# Patient Record
Sex: MALE | Race: WHITE | URBAN - METROPOLITAN AREA
[De-identification: names, ages, dates, MRNs, and addresses within clinical notes are randomized per-mention and may not be internally consistent; named-entity substitution may affect disease eponyms.]

---

## 2021-07-12 ENCOUNTER — TELEPHONE (OUTPATIENT)
Dept: ORTHOPEDIC SURGERY | Age: 36
End: 2021-07-12

## 2021-07-12 NOTE — TELEPHONE ENCOUNTER
Appointment Request     Patient requesting earlier appointment: Yes  Appointment offered to patient: 8/3/2021  Patient Contact Number: [phone]   [place] LOCATION WORKS BEST PATIENT STATED. no gum bleeding/no nose bleeding/no skin lumps

## 2021-07-15 ENCOUNTER — OFFICE VISIT (OUTPATIENT)
Dept: ORTHOPEDIC SURGERY | Age: 36
End: 2021-07-15
Payer: MEDICAID

## 2021-07-15 VITALS — BODY MASS INDEX: 29.4 KG/M2 | WEIGHT: 210 LBS | HEIGHT: 71 IN

## 2021-07-15 DIAGNOSIS — M22.41 CHONDROMALACIA OF BOTH PATELLAE: Primary | ICD-10-CM

## 2021-07-15 DIAGNOSIS — M65.9 SYNOVITIS OF BOTH KNEE JOINTS: ICD-10-CM

## 2021-07-15 DIAGNOSIS — M22.42 CHONDROMALACIA OF BOTH PATELLAE: Primary | ICD-10-CM

## 2021-07-15 PROBLEM — M65.962 SYNOVITIS OF BOTH KNEE JOINTS: Status: ACTIVE | Noted: 2021-07-15

## 2021-07-15 PROBLEM — M65.961 SYNOVITIS OF BOTH KNEE JOINTS: Status: ACTIVE | Noted: 2021-07-15

## 2021-07-15 PROCEDURE — 99203 OFFICE O/P NEW LOW 30 MIN: CPT | Performed by: ORTHOPAEDIC SURGERY
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
## 2021-07-26 ENCOUNTER — TELEPHONE (OUTPATIENT)
Dept: ORTHOPEDIC SURGERY | Age: 36
End: 2021-07-26

## 2021-07-30 NOTE — TELEPHONE ENCOUNTER
Auth: # [id number]    Date: 8/16/2021 thru 11/14/2021  Type of SX:  Outpatient  [place] [facility] [place]  CPT: 55235, 94822   DX Code: M22.42, M65.9  SX area: Lt knee  Insurance: [facility]

## 2021-08-07 ENCOUNTER — HOSPITAL ENCOUNTER (EMERGENCY)
Age: 36
Discharge: HOME | End: 2021-08-08
Attending: EMERGENCY MEDICINE
Payer: MEDICAID

## 2021-08-07 ENCOUNTER — APPOINTMENT (OUTPATIENT)
Dept: GENERAL RADIOLOGY | Age: 36
End: 2021-08-07
Payer: MEDICAID

## 2021-08-07 VITALS
WEIGHT: 220 LBS | OXYGEN SATURATION: 99 % | TEMPERATURE: 98.8 F | BODY MASS INDEX: 30.8 KG/M2 | HEART RATE: 54 BPM | HEIGHT: 71 IN | DIASTOLIC BLOOD PRESSURE: 76 MMHG | RESPIRATION RATE: 18 BRPM | SYSTOLIC BLOOD PRESSURE: 119 MMHG

## 2021-08-07 DIAGNOSIS — S61.319A LACERATION OF NAIL BED OF FINGER, INITIAL ENCOUNTER: ICD-10-CM

## 2021-08-07 DIAGNOSIS — S62.523B OPEN FRACTURE OF TUFT OF DISTAL PHALANX OF THUMB: Primary | ICD-10-CM

## 2021-08-07 PROCEDURE — 12001 RPR S/N/AX/GEN/TRNK 2.5CM/<: CPT

## 2021-08-07 PROCEDURE — 99285 EMERGENCY DEPT VISIT HI MDM: CPT

## 2021-08-07 PROCEDURE — 6370000000 HC RX 637 (ALT 250 FOR IP): Performed by: EMERGENCY MEDICINE

## 2021-08-07 PROCEDURE — 73140 X-RAY EXAM OF FINGER(S): CPT

## 2021-08-07 PROCEDURE — 90715 TDAP VACCINE 7 YRS/> IM: CPT | Performed by: EMERGENCY MEDICINE

## 2021-08-07 PROCEDURE — 90471 IMMUNIZATION ADMIN: CPT | Performed by: EMERGENCY MEDICINE

## 2021-08-07 PROCEDURE — 12002 RPR S/N/AX/GEN/TRNK2.6-7.5CM: CPT

## 2021-08-07 PROCEDURE — 6360000002 HC RX W HCPCS: Performed by: EMERGENCY MEDICINE

## 2021-08-07 RX ORDER — HYDROCODONE BITARTRATE AND ACETAMINOPHEN 5; 325 MG/1; MG/1
1 TABLET ORAL ONCE
Status: COMPLETED | OUTPATIENT
Start: 2021-08-07 | End: 2021-08-07

## 2021-08-07 RX ORDER — IBUPROFEN 600 MG/1
600 TABLET ORAL ONCE
Status: COMPLETED | OUTPATIENT
Start: 2021-08-07 | End: 2021-08-07

## 2021-08-07 RX ORDER — CEPHALEXIN 500 MG/1
500 CAPSULE ORAL 4 TIMES DAILY
Qty: 28 CAPSULE | Refills: 0 | Status: SHIPPED | OUTPATIENT
Start: 2021-08-07 | End: 2021-08-14

## 2021-08-07 RX ORDER — HYDROCODONE BITARTRATE AND ACETAMINOPHEN 5; 325 MG/1; MG/1
1 TABLET ORAL EVERY 8 HOURS PRN
Qty: 8 TABLET | Refills: 0 | Status: SHIPPED | OUTPATIENT
Start: 2021-08-07 | End: 2021-08-10

## 2021-08-07 RX ORDER — CEPHALEXIN 500 MG/1
500 CAPSULE ORAL ONCE
Status: COMPLETED | OUTPATIENT
Start: 2021-08-07 | End: 2021-08-07

## 2021-08-07 RX ADMIN — HYDROCODONE BITARTRATE AND ACETAMINOPHEN 1 TABLET: 5; 325 TABLET ORAL at 22:17

## 2021-08-07 RX ADMIN — CEPHALEXIN 500 MG: 500 CAPSULE ORAL at 23:04

## 2021-08-07 RX ADMIN — IBUPROFEN 600 MG: 600 TABLET, FILM COATED ORAL at 22:17

## 2021-08-07 RX ADMIN — TETANUS TOXOID, REDUCED DIPHTHERIA TOXOID AND ACELLULAR PERTUSSIS VACCINE, ADSORBED 0.5 ML: 5; 2.5; 8; 8; 2.5 SUSPENSION INTRAMUSCULAR at 22:17

## 2021-08-07 ASSESSMENT — PAIN SCALES - GENERAL
PAINLEVEL_OUTOF10: 8
PAINLEVEL_OUTOF10: 9

## 2021-08-07 ASSESSMENT — PAIN DESCRIPTION - PAIN TYPE: TYPE: ACUTE PAIN

## 2021-08-07 ASSESSMENT — PAIN DESCRIPTION - LOCATION: LOCATION: HAND

## 2021-08-07 ASSESSMENT — PAIN DESCRIPTION - ORIENTATION: ORIENTATION: LEFT

## 2021-08-08 ASSESSMENT — PAIN SCALES - GENERAL: PAINLEVEL_OUTOF10: 5

## 2021-08-08 ASSESSMENT — PAIN - FUNCTIONAL ASSESSMENT: PAIN_FUNCTIONAL_ASSESSMENT: 0-10

## 2021-08-08 NOTE — ED NOTES
Second perfect serve message sent and Dr. Biju Peck called back.      Hossein Eduardon, EMT-P  08/07/21 3942

## 2021-08-08 NOTE — ED PROVIDER NOTES
 Ran Out of Food in the Last Year:    Transportation Needs:     Lack of Transportation (Medical):  Lack of Transportation (Non-Medical):    Physical Activity:     Days of Exercise per Week:     Minutes of Exercise per Session:    Stress:     Feeling of Stress :    Social Connections:     Frequency of Communication with Friends and Family:     Frequency of Social Gatherings with Friends and Family:     Attends Mormonism Services:     Active Member of Clubs or Organizations:     Attends Club or Organization Meetings:     Marital Status:    Intimate Partner Violence:     Fear of Current or Ex-Partner:     Emotionally Abused:     Physically Abused:     Sexually Abused:      No current facility-administered medications for this encounter. Current Outpatient Medications   Medication Sig Dispense Refill    cephALEXin (KEFLEX) 500 MG capsule Take 1 capsule by mouth 4 times daily for 7 days 28 capsule 0    HYDROcodone-acetaminophen (NORCO) 5-325 MG per tablet Take 1 tablet by mouth every 8 hours as needed for Pain for up to 3 days. Intended supply: 3 days. Take lowest dose possible to manage pain 8 tablet 0    Cholecalciferol (VITAMIN D) 2000 UNITS CAPS capsule Take by mouth       FLUoxetine (PROZAC) 10 MG tablet Take 10 mg by mouth daily        No Known Allergies    REVIEW OF SYSTEMS  10 systems reviewed, pertinent positives per HPI otherwise noted to be negative. PHYSICAL EXAM  /76   Pulse 54   Temp 98.8 °F (37.1 °C) (Oral)   Resp 18   Ht 5' 11\" (1.803 m)   Wt 220 lb (99.8 kg)   SpO2 99%   BMI 30.68 kg/m²    Physical exam:  General appearance: awake and cooperative. Mild pain distress. Non toxic appearing. Skin: Warm and dry. No rashes or lesions. HENT: Normocephalic. Atraumatic. Neck: supple  Eyes: JOVANI. EOM intact. Heart: RRR. No murmurs. Lungs: Respirations unlabored. CTAB. No wheezes, rales, or rhonchi. Good air exchange  Abdomen: No tenderness. Soft.  Non distended. No peritoneal signs. Musculoskeletal: left hand: there is tenderness to palpation and mild soft tissue swelling to distal thumb; there is linear laceration to lateral nailbed without subungual hematoma laceration does not extend to surrounding skin; perfusion distal to laceration is intact; sensation intact distal thumb; MCP, PIP and DIP intact but Dip limited by pain; medial, radial, and ulnar nerves intact motor and sensory. cardinal motions of hand intact. Cap refill <2 seconds. Radial and ulnar pulses +2/4 bilaterally. No extremity edema. Compartments soft. No deformity. All extremities neurovascularly intact. Neurological: Alert and oriented. No focal deficits. No aphasia or dysarthria. No gait ataxia. Psychiatric: Normal mood and affect. LABS  I have reviewed all labs for this visit. No results found for this visit on 08/07/21. ECG      RADIOLOGY  XR FINGER LEFT (MIN 2 VIEWS)    Result Date: 8/7/2021  EXAMINATION: 3 XRAY VIEWS OF THE LEFT FINGER 8/7/2021 10:09 pm COMPARISON: None. HISTORY: ORDERING SYSTEM PROVIDED HISTORY: smashed left thumb with mallet distally TECHNOLOGIST PROVIDED HISTORY: Reason for exam:->smashed left thumb with mallet distally Reason for Exam: lt thumb pain, swelling Acuity: Acute Type of Exam: Initial Mechanism of Injury: smashed finger with mallet FINDINGS: There is a mildly displaced and slightly comminuted fracture along the tuft of the distal phalanx of the 1st digit. The joint spaces are intact. There is mild soft tissue swelling throughout the digit. Mildly displaced and slightly comminuted tuft fracture along the distal phalanx of the 1st digit. ED COURSE/MDM  Patient seen and evaluated. Old records reviewed. Labs and imaging reviewed and results discussed with patient. The patient is a 25-year-old presenting with left thumb injury. He is neurovascularly intact.   He does have a linear laceration to the lateral aspect of his left thumb, bleeding is controlled. He has a comminuted and mildly displaced tuft fracture of the distal thumb as well, considered an open fracture due to the nailbed laceration. He is covered with Keflex for the open laceration. I did repair the nailbed laceration with Dermabond since it was already well approximated. The patient was placed in a splint. Tetanus is updated. Due to the fracture being open, I did contact orthopedics and spoke with Dr. Corbin Choi. We discussed the case and he states that the patient can follow-up in the office on Tuesday for further evaluation and treatment. I discussed with the patient and he will follow-up. Otherwise symptomatic care instructions were given to the patient for home, as well as a short course of Norco for his fracture. He is given a prescription for Keflex. We discussed return precautions back to the ED and he voices understanding. PROCEDURE:  LACERATION REPAIR  Aung Overton or their surrogate had an opportunity to ask questions, and the risks, benefits, and alternatives were discussed. The wound was prepped and draped to maintain a sterile field. It was copiously irrigated. It was explored to its depth in a bloodless field with no sign of tendon, nerve, or vascular injury. No foreign bodies were identified. It was closed with dermabond. There were no complications during the procedure. During the patient's ED course, the patient was given:  Medications   HYDROcodone-acetaminophen (NORCO) 5-325 MG per tablet 1 tablet (1 tablet Oral Given 8/7/21 2217)   ibuprofen (ADVIL;MOTRIN) tablet 600 mg (600 mg Oral Given 8/7/21 2217)   Tetanus-Diphth-Acell Pertussis (BOOSTRIX) injection 0.5 mL (0.5 mLs Intramuscular Given 8/7/21 2217)   cephALEXin (KEFLEX) capsule 500 mg (500 mg Oral Given 8/7/21 2304)        CLINICAL IMPRESSION  1. Open fracture of tuft of distal phalanx of thumb    2.  Laceration of nail bed of finger, initial encounter        Blood pressure 119/76, pulse 54, temperature 98.8 °F (37.1 °C), temperature source Oral, resp. rate 18, height 5' 11\" (1.803 m), weight 220 lb (99.8 kg), SpO2 99 %. Patient was given scripts for the following medications. I counseled patient how to take these medications. Discharge Medication List as of 8/7/2021 11:57 PM      START taking these medications    Details   cephALEXin (KEFLEX) 500 MG capsule Take 1 capsule by mouth 4 times daily for 7 days, Disp-28 capsule, R-0Print      HYDROcodone-acetaminophen (NORCO) 5-325 MG per tablet Take 1 tablet by mouth every 8 hours as needed for Pain for up to 3 days. Intended supply: 3 days. Take lowest dose possible to manage pain, Disp-8 tablet, R-0Print             Follow-up with:  Huey Ribeiro MD  [address] [place] [address]  [phone]    Schedule an appointment as soon as possible for a visit in 3 days        DISCLAIMER: This chart was created using Dragon dictation software. Efforts were made by me to ensure accuracy, however some errors may be present due to limitations of this technology and occasionally words are not transcribed correctly.        Sedrick [address] [place] [address]  08/08/21 8082

## 2021-08-09 ENCOUNTER — TELEPHONE (OUTPATIENT)
Dept: ORTHOPEDIC SURGERY | Age: 36
End: 2021-08-09

## 2021-08-09 NOTE — TELEPHONE ENCOUNTER
Appointment Request     Patient requesting earlier appointment: Yes  Appointment offered to patient: Yes   Patient Contact Number: [phone]    Patient scheduled for tomorrow at [facility], wants to know If he can be seen at [facility] because it is closer to his home.

## 2021-08-09 NOTE — TELEPHONE ENCOUNTER
Patient states that he is willing to keep his appointment at [address] [place] [address] tomorrow due to limited scheduling times.

## 2021-08-10 ENCOUNTER — OFFICE VISIT (OUTPATIENT)
Dept: ORTHOPEDIC SURGERY | Age: 36
End: 2021-08-10
Payer: MEDICAID

## 2021-08-10 ENCOUNTER — HOSPITAL ENCOUNTER (OUTPATIENT)
Age: 36
Discharge: HOME | End: 2021-08-10
Payer: MEDICAID

## 2021-08-10 VITALS — HEIGHT: 71 IN | BODY MASS INDEX: 30.8 KG/M2 | WEIGHT: 220 LBS | RESPIRATION RATE: 16 BRPM

## 2021-08-10 DIAGNOSIS — F17.200 CURRENT SMOKER: ICD-10-CM

## 2021-08-10 DIAGNOSIS — S62.522B OPEN FRACTURE OF TUFT OF DISTAL PHALANX OF LEFT THUMB: Primary | ICD-10-CM

## 2021-08-10 PROCEDURE — 26750 TREAT FINGER FRACTURE EACH: CPT | Performed by: ORTHOPAEDIC SURGERY

## 2021-08-10 PROCEDURE — 99214 OFFICE O/P EST MOD 30 MIN: CPT | Performed by: ORTHOPAEDIC SURGERY

## 2021-08-10 PROCEDURE — G8417 CALC BMI ABV UP PARAM F/U: HCPCS | Performed by: ORTHOPAEDIC SURGERY

## 2021-08-10 PROCEDURE — 99406 BEHAV CHNG SMOKING 3-10 MIN: CPT | Performed by: ORTHOPAEDIC SURGERY

## 2021-08-10 PROCEDURE — U0005 INFEC AGEN DETEC AMPLI PROBE: HCPCS

## 2021-08-10 PROCEDURE — 4004F PT TOBACCO SCREEN RCVD TLK: CPT | Performed by: ORTHOPAEDIC SURGERY

## 2021-08-10 PROCEDURE — G8427 DOCREV CUR MEDS BY ELIG CLIN: HCPCS | Performed by: ORTHOPAEDIC SURGERY

## 2021-08-10 PROCEDURE — U0003 INFECTIOUS AGENT DETECTION BY NUCLEIC ACID (DNA OR RNA); SEVERE ACUTE RESPIRATORY SYNDROME CORONAVIRUS 2 (SARS-COV-2) (CORONAVIRUS DISEASE [COVID-19]), AMPLIFIED PROBE TECHNIQUE, MAKING USE OF HIGH THROUGHPUT TECHNOLOGIES AS DESCRIBED BY CMS-2020-01-R: HCPCS

## 2021-08-10 NOTE — PROGRESS NOTES
CHIEF COMPLAINT: Left hand thumb pain/ thumb distal phalanx open fracture. DATE OF INJURY: 8/7/2021    HISTORY:  Mr. Krunal Walton 39 y.o.  male right handed presents today for the first visit for evaluation of a left hand injury which occurred when he hit his thumb with a mallet. He noticed blood coming from the nail. He is complaining of thumb pain and swelling. He rates his pain a 4/10 VAS. This is better with elevation and worse with ROM. The pain is sharp and not radiating. He is on antibiotics. No other complaint. He was seen at [facility], was evaluated, started on antibiotics and given a tetanus shot and splinted and asked to see orthopedics. He has a heavy duty job. He is currently off work for upcoming knee surgery by Dr. Darell Mehta on 8/16/2021. He has a smoker 1 pack of cigarettes a day. History reviewed. No pertinent past medical history. History reviewed. No pertinent surgical history. Social History     Socioeconomic History    Marital status: Single     Spouse name: Not on file    Number of children: Not on file    Years of education: Not on file    Highest education level: Not on file   Occupational History    Not on file   Tobacco Use    Smoking status: Current Every Day Smoker     Packs/day: 1.00     Types: Cigarettes    Smokeless tobacco: Never Used   Substance and Sexual Activity    Alcohol use: Yes     Comment: rarely    Drug use: No    Sexual activity: Yes     Partners: Female   Other Topics Concern    Not on file   Social History Narrative    Not on file     Social Determinants of Health     Financial Resource Strain:     Difficulty of Paying Living Expenses:    Food Insecurity:     Worried About Running Out of Food in the Last Year:     [address] in the Last Year:    Transportation Needs:     Lack of Transportation (Medical):      Lack of Transportation (Non-Medical):    Physical Activity:     Days of Exercise per Week:     Minutes of Exercise per Session:    Stress:     Feeling of Stress :    Social Connections:     Frequency of Communication with Friends and Family:     Frequency of Social Gatherings with Friends and Family:     Attends Orthodoxy Services:     Active Member of Clubs or Organizations:     Attends Club or Organization Meetings:     Marital Status:    Intimate Partner Violence:     Fear of Current or Ex-Partner:     Emotionally Abused:     Physically Abused:     Sexually Abused:        History reviewed. No pertinent family history. Current Outpatient Medications on File Prior to Visit   Medication Sig Dispense Refill    cephALEXin (KEFLEX) 500 MG capsule Take 1 capsule by mouth 4 times daily for 7 days 28 capsule 0    HYDROcodone-acetaminophen (NORCO) 5-325 MG per tablet Take 1 tablet by mouth every 8 hours as needed for Pain for up to 3 days. Intended supply: 3 days. Take lowest dose possible to manage pain 8 tablet 0    Cholecalciferol (VITAMIN D) 2000 UNITS CAPS capsule Take by mouth       FLUoxetine (PROZAC) 10 MG tablet Take 10 mg by mouth daily        No current facility-administered medications on file prior to visit. Pertinent items are noted in HPI  Review of systems reviewed from Patient History Form dated on 8/10/2021 and available in the patient's chart under the Media tab. No change noted. PHYSICAL EXAMINATION:  Mr. Evelina Hammer is a very pleasant 39 y.o.  male who presents today in no acute distress, awake, alert, and oriented. He is well dressed, nourished and  groomed. Patient with normal affect. Height is  5' 11\" (1.803 m), weight is 220 lb (99.8 kg), Body mass index is 30.68 kg/m². Resting respiratory rate is 16. Examination of the gait, showed that the patient walks with no limp . Examination of both upper extremities showing a decreased range of motion of the left thumb compare to the other side.   There is moderate swelling that can be seen, as well as ecchymosis of the thumb. There is an area near the radial side of the thumb near the nail with small laceration with Dermabond. He has intact sensation and good radial pulses. He has significant tenderness on deep palpation over the distal phalanx of the thumb left hand. There is no rotational deformity of the left thumb. IMAGING: Bibi Mcrae were reviewed, dated 8/7/2021 from [facility],  3 views of the left hand, and showed a thumb distal phalanx minimally displaced tuft fracture. IMPRESSION: Left hand thumb distal phalanx minimally displaced open fracture. PLAN:  I discussed that the overall alignment of this fracture is good and that we can try to treat this non-operatively. Band-Aid applied to protect the nail. We discussed the risk of nonunion and or malunion. Continue antibiotics until gone. We will see him  back in 6 weeks at which time we will get a new xray of the left hand. The patient smokes, and we discussed with the patient the risks of smoking on general health and also on bone and soft tissue healing (delay and non-union), and promised to cut down or stop smoking. Smoking: Educated the patient regarding the hazards of smoking and that it harms their body in many ways. It increases the chance of developing heart disease, lung disease, cancer, and other health problems including poor bone and wound healing. The importance of smoking cessation for optimal bone and wound healing was stressed. This was communicated verbally, 5 Minutes.       Sakina Mathis MD

## 2021-08-11 ENCOUNTER — TELEPHONE (OUTPATIENT)
Dept: ORTHOPEDIC SURGERY | Age: 36
End: 2021-08-11

## 2021-08-11 LAB — SARS-COV-2, PCR: NOT DETECTED

## 2021-08-11 RX ORDER — ESOMEPRAZOLE MAGNESIUM 40 MG/1
40 FOR SUSPENSION ORAL DAILY
COMMUNITY
End: 2021-12-16

## 2021-08-11 RX ORDER — MELOXICAM 15 MG/1
15 TABLET ORAL DAILY
COMMUNITY
End: 2021-12-16

## 2021-08-11 NOTE — PROGRESS NOTES
Preoperative Screening for Elective Surgery/Invasive Procedures While COVID-19 present in the community     Have you had any of the following symptoms?no  o Fever, chills  o Cough  o Shortness of breath  o Muscle aches/pain  o Diarrhea  o Abdominal pain, nausea, vomiting  o Loss or decrease in taste and / or smell   Risk of Exposure  o Have you recently been hospitalized for COVID-19 or flu-like illness, if so when?no  o Recently diagnosed with COVID-19, if so when?no  o Recently tested for COVID-19, if so when?yes 8/10/21 for surgery  o Have you been in close contact with a person or family member who currently has or recently had COVID-19? If yes, when and in what context?no  o Do you live with anybody who in the last 14 days has had fever, chills, shortness of breath, muscle aches, flu-like illness?no  o Do you have any close contacts or family members who are currently in the hospital for COVID-19 or flu-like illness? If yes, assess recent close contact with this person. no    Indicate if the patient has a positive screen by answering yes to one or more of the above questions. Patients who test positive or screen positive prior to surgery or on the day of surgery should be evaluated in conjunction with the surgeon/proceduralist/anesthesiologist to determine the urgency of the procedure.

## 2021-08-11 NOTE — PROGRESS NOTES
PRE OP INSTRUCTION SHEET   1. Do not eat or drink anything after 12 midnight  prior to surgery. This includes no water, chewing gum or mints. 2. Take the following pills will a small sip of water (see MAR)                                        3. Aspirin, Ibuprofen, Advil, Naproxen, Vitamin E, fish oil and other Anti-inflammatory products should be stopped for 5 days before surgery or as directed by your physician. 4. Check with your Doctor regarding stopping Plavix, Coumadin, Lovenox, Fragmin or other blood thinners   5. Do not smoke, and do not drink any alcoholic beverages 24 hours prior to surgery. This includes NA Beer. 6. You may brush your teeth and gargle the morning of surgery. DO NOT SWALLOW WATER   7. You MUST make arrangements for a responsible adult to take you home after your surgery. You will not be allowed to leave alone or drive yourself home. It is strongly suggested someone stay with you the first 24 hrs. Your surgery will be cancelled if you do not have a ride home. 8. A parent/legal guardian must accompany a child scheduled for surgery and plan to stay at the hospital until the child is discharged. Please do not bring other children with you. 9. Please wear simple, loose fitting clothing to the hospital.  [place] not bring valuables (money, credit cards, checkbooks, etc.) Do not wear any makeup (including no eye makeup) or nail polish on your fingers or toes. 10. DO NOT wear any jewelry or piercings on day of surgery. All body piercing jewelry must be removed. 11. If you have dentures,glasses, or contacts they will be removed before going to the OR; we will provide you a container. 12. Please see your family doctor/and cardiologist for a history & physical and/or concerning medications. Bring any test results/reports from your physician's office. Have history and labs faxed to [phone].  Remember to bring Blood Bank bracelet on the day of surgery. 14. If you have a Living Will and Durable Power of  for Healthcare, please bring in a copy. 13. Notify your Surgeon if you develop any illness between now and surgery  time, cough, cold, fever, sore throat, nausea, vomiting, etc.  Please notify your surgeon if you experience dizziness, shortness of breath or blurred vision between now & the time of your surgery   16. DO NOT shave your operative site 96 hours prior to surgery. For face & neck surgery, men may use an electric razor 48 hours prior to surgery. 17. Shower with _x__Antibacterial soap (x_chlorhexidine for total joint  Pt's) shower two times before surgery.(the morning of and the night before. 18. To provide excellent care visitors will be limited to one in the room at any given time.   Please call pre admission testing if you any further questions [phone] or [phone]

## 2021-08-14 ENCOUNTER — ANESTHESIA EVENT (OUTPATIENT)
Dept: OPERATING ROOM | Age: 36
End: 2021-08-14
Payer: MEDICAID

## 2021-08-15 PROBLEM — S62.522B: Status: ACTIVE | Noted: 2021-08-15

## 2021-08-16 ENCOUNTER — HOSPITAL ENCOUNTER (OUTPATIENT)
Age: 36
Setting detail: OUTPATIENT SURGERY
Discharge: HOME | End: 2021-08-16
Attending: ORTHOPAEDIC SURGERY | Admitting: ORTHOPAEDIC SURGERY
Payer: MEDICAID

## 2021-08-16 ENCOUNTER — ANESTHESIA (OUTPATIENT)
Dept: OPERATING ROOM | Age: 36
End: 2021-08-16
Payer: MEDICAID

## 2021-08-16 VITALS
RESPIRATION RATE: 14 BRPM | SYSTOLIC BLOOD PRESSURE: 125 MMHG | BODY MASS INDEX: 30.8 KG/M2 | OXYGEN SATURATION: 97 % | DIASTOLIC BLOOD PRESSURE: 74 MMHG | HEART RATE: 82 BPM | WEIGHT: 220 LBS | TEMPERATURE: 98.2 F | HEIGHT: 71 IN

## 2021-08-16 VITALS
TEMPERATURE: 95.9 F | DIASTOLIC BLOOD PRESSURE: 60 MMHG | SYSTOLIC BLOOD PRESSURE: 110 MMHG | OXYGEN SATURATION: 98 % | RESPIRATION RATE: 14 BRPM

## 2021-08-16 DIAGNOSIS — M22.41 CHONDROMALACIA OF BOTH PATELLAE: ICD-10-CM

## 2021-08-16 DIAGNOSIS — M65.9 SYNOVITIS OF BOTH KNEE JOINTS: ICD-10-CM

## 2021-08-16 DIAGNOSIS — M22.42 CHONDROMALACIA OF BOTH PATELLAE: ICD-10-CM

## 2021-08-16 DIAGNOSIS — S62.522B OPEN FRACTURE OF TUFT OF DISTAL PHALANX OF LEFT THUMB: Primary | ICD-10-CM

## 2021-08-16 PROCEDURE — 6360000002 HC RX W HCPCS

## 2021-08-16 PROCEDURE — 7100000001 HC PACU RECOVERY - ADDTL 15 MIN: Performed by: ORTHOPAEDIC SURGERY

## 2021-08-16 PROCEDURE — 2580000003 HC RX 258: Performed by: ANESTHESIOLOGY

## 2021-08-16 PROCEDURE — 7100000010 HC PHASE II RECOVERY - FIRST 15 MIN: Performed by: ORTHOPAEDIC SURGERY

## 2021-08-16 PROCEDURE — 3600000014 HC SURGERY LEVEL 4 ADDTL 15MIN: Performed by: ORTHOPAEDIC SURGERY

## 2021-08-16 PROCEDURE — 3700000001 HC ADD 15 MINUTES (ANESTHESIA): Performed by: ORTHOPAEDIC SURGERY

## 2021-08-16 PROCEDURE — 6370000000 HC RX 637 (ALT 250 FOR IP): Performed by: ANESTHESIOLOGY

## 2021-08-16 PROCEDURE — 2500000003 HC RX 250 WO HCPCS: Performed by: ORTHOPAEDIC SURGERY

## 2021-08-16 PROCEDURE — 2580000003 HC RX 258: Performed by: ORTHOPAEDIC SURGERY

## 2021-08-16 PROCEDURE — 2500000003 HC RX 250 WO HCPCS

## 2021-08-16 PROCEDURE — 3700000000 HC ANESTHESIA ATTENDED CARE: Performed by: ORTHOPAEDIC SURGERY

## 2021-08-16 PROCEDURE — 2709999900 HC NON-CHARGEABLE SUPPLY: Performed by: ORTHOPAEDIC SURGERY

## 2021-08-16 PROCEDURE — 7100000000 HC PACU RECOVERY - FIRST 15 MIN: Performed by: ORTHOPAEDIC SURGERY

## 2021-08-16 PROCEDURE — 7100000011 HC PHASE II RECOVERY - ADDTL 15 MIN: Performed by: ORTHOPAEDIC SURGERY

## 2021-08-16 PROCEDURE — 6360000002 HC RX W HCPCS: Performed by: ANESTHESIOLOGY

## 2021-08-16 PROCEDURE — 3600000004 HC SURGERY LEVEL 4 BASE: Performed by: ORTHOPAEDIC SURGERY

## 2021-08-16 PROCEDURE — 2500000003 HC RX 250 WO HCPCS: Performed by: ANESTHESIOLOGY

## 2021-08-16 PROCEDURE — 6360000002 HC RX W HCPCS: Performed by: ORTHOPAEDIC SURGERY

## 2021-08-16 RX ORDER — PROPOFOL 10 MG/ML
INJECTION, EMULSION INTRAVENOUS PRN
Status: DISCONTINUED | OUTPATIENT
Start: 2021-08-16 | End: 2021-08-16 | Stop reason: SDUPTHER

## 2021-08-16 RX ORDER — MORPHINE SULFATE 10 MG/ML
1 INJECTION, SOLUTION INTRAMUSCULAR; INTRAVENOUS EVERY 5 MIN PRN
Status: DISCONTINUED | OUTPATIENT
Start: 2021-08-16 | End: 2021-08-16 | Stop reason: HOSPADM

## 2021-08-16 RX ORDER — FENTANYL CITRATE 50 UG/ML
INJECTION, SOLUTION INTRAMUSCULAR; INTRAVENOUS PRN
Status: DISCONTINUED | OUTPATIENT
Start: 2021-08-16 | End: 2021-08-16 | Stop reason: SDUPTHER

## 2021-08-16 RX ORDER — ONDANSETRON 2 MG/ML
INJECTION INTRAMUSCULAR; INTRAVENOUS PRN
Status: DISCONTINUED | OUTPATIENT
Start: 2021-08-16 | End: 2021-08-16 | Stop reason: SDUPTHER

## 2021-08-16 RX ORDER — OXYCODONE HYDROCHLORIDE AND ACETAMINOPHEN 5; 325 MG/1; MG/1
1 TABLET ORAL PRN
Status: COMPLETED | OUTPATIENT
Start: 2021-08-16 | End: 2021-08-16

## 2021-08-16 RX ORDER — MEPERIDINE HYDROCHLORIDE 25 MG/ML
12.5 INJECTION INTRAMUSCULAR; INTRAVENOUS; SUBCUTANEOUS EVERY 5 MIN PRN
Status: DISCONTINUED | OUTPATIENT
Start: 2021-08-16 | End: 2021-08-16 | Stop reason: HOSPADM

## 2021-08-16 RX ORDER — SODIUM CHLORIDE 9 MG/ML
25 INJECTION, SOLUTION INTRAVENOUS PRN
Status: DISCONTINUED | OUTPATIENT
Start: 2021-08-16 | End: 2021-08-16 | Stop reason: HOSPADM

## 2021-08-16 RX ORDER — BUPIVACAINE HYDROCHLORIDE 2.5 MG/ML
INJECTION, SOLUTION INFILTRATION; PERINEURAL PRN
Status: DISCONTINUED | OUTPATIENT
Start: 2021-08-16 | End: 2021-08-16 | Stop reason: ALTCHOICE

## 2021-08-16 RX ORDER — PROMETHAZINE HYDROCHLORIDE 25 MG/ML
6.25 INJECTION, SOLUTION INTRAMUSCULAR; INTRAVENOUS
Status: DISCONTINUED | OUTPATIENT
Start: 2021-08-16 | End: 2021-08-16 | Stop reason: HOSPADM

## 2021-08-16 RX ORDER — ONDANSETRON 2 MG/ML
4 INJECTION INTRAMUSCULAR; INTRAVENOUS PRN
Status: DISCONTINUED | OUTPATIENT
Start: 2021-08-16 | End: 2021-08-16 | Stop reason: HOSPADM

## 2021-08-16 RX ORDER — HYDROCODONE BITARTRATE AND ACETAMINOPHEN 7.5; 325 MG/1; MG/1
1 TABLET ORAL EVERY 6 HOURS PRN
Qty: 28 TABLET | Refills: 0 | Status: SHIPPED | OUTPATIENT
Start: 2021-08-16 | End: 2021-08-23

## 2021-08-16 RX ORDER — SODIUM CHLORIDE 0.9 % (FLUSH) 0.9 %
5-40 SYRINGE (ML) INJECTION PRN
Status: DISCONTINUED | OUTPATIENT
Start: 2021-08-16 | End: 2021-08-16 | Stop reason: HOSPADM

## 2021-08-16 RX ORDER — LABETALOL HYDROCHLORIDE 5 MG/ML
5 INJECTION, SOLUTION INTRAVENOUS EVERY 10 MIN PRN
Status: DISCONTINUED | OUTPATIENT
Start: 2021-08-16 | End: 2021-08-16 | Stop reason: HOSPADM

## 2021-08-16 RX ORDER — DEXAMETHASONE SODIUM PHOSPHATE 4 MG/ML
INJECTION, SOLUTION INTRA-ARTICULAR; INTRALESIONAL; INTRAMUSCULAR; INTRAVENOUS; SOFT TISSUE PRN
Status: DISCONTINUED | OUTPATIENT
Start: 2021-08-16 | End: 2021-08-16 | Stop reason: SDUPTHER

## 2021-08-16 RX ORDER — SODIUM CHLORIDE, SODIUM LACTATE, POTASSIUM CHLORIDE, CALCIUM CHLORIDE 600; 310; 30; 20 MG/100ML; MG/100ML; MG/100ML; MG/100ML
INJECTION, SOLUTION INTRAVENOUS CONTINUOUS
Status: DISCONTINUED | OUTPATIENT
Start: 2021-08-16 | End: 2021-08-16 | Stop reason: HOSPADM

## 2021-08-16 RX ORDER — MORPHINE SULFATE 10 MG/ML
2 INJECTION, SOLUTION INTRAMUSCULAR; INTRAVENOUS EVERY 5 MIN PRN
Status: DISCONTINUED | OUTPATIENT
Start: 2021-08-16 | End: 2021-08-16 | Stop reason: HOSPADM

## 2021-08-16 RX ORDER — MAGNESIUM HYDROXIDE 1200 MG/15ML
LIQUID ORAL CONTINUOUS PRN
Status: COMPLETED | OUTPATIENT
Start: 2021-08-16 | End: 2021-08-16

## 2021-08-16 RX ORDER — OXYCODONE HYDROCHLORIDE AND ACETAMINOPHEN 5; 325 MG/1; MG/1
2 TABLET ORAL PRN
Status: COMPLETED | OUTPATIENT
Start: 2021-08-16 | End: 2021-08-16

## 2021-08-16 RX ORDER — ROCURONIUM BROMIDE 10 MG/ML
INJECTION, SOLUTION INTRAVENOUS PRN
Status: DISCONTINUED | OUTPATIENT
Start: 2021-08-16 | End: 2021-08-16 | Stop reason: SDUPTHER

## 2021-08-16 RX ORDER — LIDOCAINE HYDROCHLORIDE 20 MG/ML
INJECTION, SOLUTION EPIDURAL; INFILTRATION; INTRACAUDAL; PERINEURAL PRN
Status: DISCONTINUED | OUTPATIENT
Start: 2021-08-16 | End: 2021-08-16 | Stop reason: SDUPTHER

## 2021-08-16 RX ORDER — LIDOCAINE HYDROCHLORIDE 10 MG/ML
0.3 INJECTION, SOLUTION EPIDURAL; INFILTRATION; INTRACAUDAL; PERINEURAL
Status: COMPLETED | OUTPATIENT
Start: 2021-08-16 | End: 2021-08-16

## 2021-08-16 RX ORDER — DIPHENHYDRAMINE HYDROCHLORIDE 50 MG/ML
12.5 INJECTION INTRAMUSCULAR; INTRAVENOUS
Status: DISCONTINUED | OUTPATIENT
Start: 2021-08-16 | End: 2021-08-16 | Stop reason: HOSPADM

## 2021-08-16 RX ORDER — HYDRALAZINE HYDROCHLORIDE 20 MG/ML
5 INJECTION INTRAMUSCULAR; INTRAVENOUS EVERY 10 MIN PRN
Status: DISCONTINUED | OUTPATIENT
Start: 2021-08-16 | End: 2021-08-16 | Stop reason: HOSPADM

## 2021-08-16 RX ORDER — SODIUM CHLORIDE 0.9 % (FLUSH) 0.9 %
5-40 SYRINGE (ML) INJECTION EVERY 12 HOURS SCHEDULED
Status: DISCONTINUED | OUTPATIENT
Start: 2021-08-16 | End: 2021-08-16 | Stop reason: HOSPADM

## 2021-08-16 RX ORDER — MIDAZOLAM HYDROCHLORIDE 1 MG/ML
INJECTION INTRAMUSCULAR; INTRAVENOUS PRN
Status: DISCONTINUED | OUTPATIENT
Start: 2021-08-16 | End: 2021-08-16 | Stop reason: SDUPTHER

## 2021-08-16 RX ADMIN — HYDROMORPHONE HYDROCHLORIDE 0.5 MG: 1 INJECTION, SOLUTION INTRAMUSCULAR; INTRAVENOUS; SUBCUTANEOUS at 08:25

## 2021-08-16 RX ADMIN — OXYCODONE HYDROCHLORIDE AND ACETAMINOPHEN 1 TABLET: 5; 325 TABLET ORAL at 08:42

## 2021-08-16 RX ADMIN — SUGAMMADEX 200 MG: 100 INJECTION, SOLUTION INTRAVENOUS at 07:52

## 2021-08-16 RX ADMIN — Medication 2000 MG: at 07:22

## 2021-08-16 RX ADMIN — FENTANYL CITRATE 100 MCG: 50 INJECTION INTRAMUSCULAR; INTRAVENOUS at 07:34

## 2021-08-16 RX ADMIN — LIDOCAINE HYDROCHLORIDE 80 MG: 20 INJECTION, SOLUTION EPIDURAL; INFILTRATION; INTRACAUDAL; PERINEURAL at 07:34

## 2021-08-16 RX ADMIN — MIDAZOLAM 2 MG: 1 INJECTION INTRAMUSCULAR; INTRAVENOUS at 07:27

## 2021-08-16 RX ADMIN — PROPOFOL 200 MG: 10 INJECTION, EMULSION INTRAVENOUS at 07:34

## 2021-08-16 RX ADMIN — DEXAMETHASONE SODIUM PHOSPHATE 8 MG: 4 INJECTION, SOLUTION INTRAMUSCULAR; INTRAVENOUS at 07:43

## 2021-08-16 RX ADMIN — ONDANSETRON 4 MG: 2 INJECTION, SOLUTION INTRAMUSCULAR; INTRAVENOUS at 07:43

## 2021-08-16 RX ADMIN — ROCURONIUM BROMIDE 30 MG: 10 INJECTION, SOLUTION INTRAVENOUS at 07:34

## 2021-08-16 RX ADMIN — LIDOCAINE HYDROCHLORIDE 0.3 ML: 10 INJECTION, SOLUTION EPIDURAL; INFILTRATION; INTRACAUDAL; PERINEURAL at 07:02

## 2021-08-16 RX ADMIN — SODIUM CHLORIDE, POTASSIUM CHLORIDE, SODIUM LACTATE AND CALCIUM CHLORIDE: 600; 310; 30; 20 INJECTION, SOLUTION INTRAVENOUS at 07:02

## 2021-08-16 ASSESSMENT — PULMONARY FUNCTION TESTS
PIF_VALUE: 2
PIF_VALUE: 16
PIF_VALUE: 15
PIF_VALUE: 22
PIF_VALUE: 16
PIF_VALUE: 16
PIF_VALUE: 0
PIF_VALUE: 0
PIF_VALUE: 16
PIF_VALUE: 15
PIF_VALUE: 1
PIF_VALUE: 15
PIF_VALUE: 16
PIF_VALUE: 25
PIF_VALUE: 3
PIF_VALUE: 1
PIF_VALUE: 16
PIF_VALUE: 16
PIF_VALUE: 2
PIF_VALUE: 18
PIF_VALUE: 14
PIF_VALUE: 1
PIF_VALUE: 1
PIF_VALUE: 22
PIF_VALUE: 15
PIF_VALUE: 15
PIF_VALUE: 1
PIF_VALUE: 15
PIF_VALUE: 15
PIF_VALUE: 16
PIF_VALUE: 16
PIF_VALUE: 15
PIF_VALUE: 2

## 2021-08-16 ASSESSMENT — PAIN SCALES - GENERAL
PAINLEVEL_OUTOF10: 3
PAINLEVEL_OUTOF10: 7
PAINLEVEL_OUTOF10: 0
PAINLEVEL_OUTOF10: 7

## 2021-08-16 ASSESSMENT — PAIN - FUNCTIONAL ASSESSMENT: PAIN_FUNCTIONAL_ASSESSMENT: 0-10

## 2021-08-16 NOTE — OP NOTE
[address]                 [address] [address]                                OPERATIVE REPORT    PATIENT NAME: Margret Hardy                    :        1985  MED REC NO:   [id number]                          ROOM:  ACCOUNT NO:   [de-identified]                           ADMIT DATE: 2021  PROVIDER:     Eric Guzman MD    DATE OF PROCEDURE:  2021    PREOPERATIVE DIAGNOSES:  Left knee chondromalacia of patella, medial  plica syndrome and lateral meniscus tear. POSTOPERATIVE DIAGNOSES:  Left knee chondromalacia of patella, medial  plica syndrome and lateral meniscus tear. OPERATION PERFORMED:  Left knee video arthroscopy with partial lateral  meniscectomy and medial plica excision. SURGEON:  Eric Guzman M.D. ANESTHESIA:  General.    BLOOD LOSS:  Less than 5 mL. COMPLICATIONS:  None noted. INDICATIONS FOR OPERATION:  This 25-year-old male who had a history of  persistent worsening left knee pain and after failure of all other  modalities, he elected for the recommendation of surgical intervention. DESCRIPTION OF OPERATION:  The patient was taken to the operating room  where a general anesthetic was obtained. Antibiotics were given IV  piggyback preoperatively. Left knee and leg were sterilely prepped and  draped and a two-port arthroscopy of the left knee was carried out in  usual fashion using a 30-degree arthroscope. Upon entry into the knee,  he was noted to have some grade 2 chondromalacia of the patella, which  was stable and left alone. He had a fibrotic medial plica with  excoriation and impingement in medial patellofemoral joint and this was  debulked. Medial meniscus was intact and stable as well as the joint  surfaces. ACL and PCL were intact.   Laterally, he had a degenerative  tear noted of the posterior third of the lateral meniscus with a loose  articular piece of hyaline cartilage floating throughout the knee, which  is about 1 cm in diameter. The tissue was removed and partial lateral  meniscectomy was performed with both rigid and power instrumentation  leaving well-contoured and balanced inner rim. The knee was then  thoroughly irrigated and evacuated of all loose debris and instilled  with 20 mL of 0.25% Marcaine plain. The port was repaired with 4-0  nylon in a figure-of-eight fashion. The knee was dressed with  Steri-Strips, dressings, sponge, ABDs, Webril, and Ace wrap. He  appeared to tolerate the procedure well and was taken to the recovery  room stable. Less than 5 mL of blood loss and no noted complications.         Lulú Veliz MD    D: 08/16/2021 8:07:56       T: 08/16/2021 13:14:49     [email or web address]  Job#: [id number]     Doc#: [id number]    CC:

## 2021-08-16 NOTE — ANESTHESIA POSTPROCEDURE EVALUATION
Department of Anesthesiology  Postprocedure Note    Patient: Kipp Nissen  MRN: [id number]  YOB: 1985  Date of evaluation: 8/16/2021  Time:  12:08 PM     Procedure Summary     Date: 08/16/21 Room / Location: [place] [address] / [facility]    Anesthesia Start: 3339 Anesthesia Stop: 8847    Procedure: LEFT KNEE VIDEO ARTHROSCOPY, CHONDROPLASTY OF PATELLA, SYNOVECTOMY (Left Knee) Diagnosis: (LEFT KNEE CHONDROMALACIA PATELLA, SYNOVITIS LEFT KNEE)    Surgeons: Cyril Mohs, MD Responsible Provider: Eduardo Hernandez MD    Anesthesia Type: general ASA Status: 2          Anesthesia Type: general    Lang Phase I: Lang Score: 10    Lang Phase II: Lang Score: 10    Last vitals: Reviewed and per EMR flowsheets.        Anesthesia Post Evaluation    Patient location during evaluation: PACU  Patient participation: complete - patient participated  Level of consciousness: awake and alert  Pain score: 0  Airway patency: patent  Nausea & Vomiting: no nausea and no vomiting  Complications: no  Cardiovascular status: blood pressure returned to baseline  Respiratory status: acceptable  Hydration status: stable

## 2021-08-16 NOTE — BRIEF OP NOTE
Brief Postoperative Note      Patient: Aung Jackson  YOB: 1985  MRN: [id number]    Date of Procedure: 8/16/2021    Pre-Op Diagnosis: LEFT KNEE CHONDROMALACIA PATELLA, SYNOVITIS LEFT KNEE, LATERAL MENISCUS TEAR    Post-Op Diagnosis: Same       Procedure(s):  LEFT KNEE VIDEO ARTHROSCOPY, CHONDROPLASTY OF PATELLA, SYNOVECTOMY AND PARTIAL LATERAL MENISECTOMY    Surgeon(s):  Sera Gamboa MD    Assistant:  Surgical Assistant: Phan King    Anesthesia: General    Estimated Blood Loss (mL): Minimal    Complications: None    Specimens:   * No specimens in log *    Implants:  * No implants in log *      Drains: * No LDAs found *    Findings: DAXA    Electronically signed by Sera Gamboa MD on 8/16/2021 at 7:53 AM

## 2021-08-16 NOTE — PROGRESS NOTES
Instructions given to mara. She verbalizes understanding,instructed pt on crutch use,he verbalizes understanding

## 2021-08-16 NOTE — ANESTHESIA PRE PROCEDURE
Department of Anesthesiology  Preprocedure Note       Name:  Aung Jackson   Age:  39 y.o.  :  1985                                          MRN:  [id number]         Date:  2021      Surgeon: Anastasiia Lovett):  Sera Gamboa MD    Procedure: Procedure(s):  LEFT KNEE VIDEO ARTHROSCOPY, CHONDROPLASTY OF PATELLA, SYNOVECTOMY    Medications prior to admission:   Prior to Admission medications    Medication Sig Start Date End Date Taking?  Authorizing Provider   meloxicam (MOBIC) 15 MG tablet Take 15 mg by mouth daily   Yes Historical Provider, MD   esomeprazole Magnesium (NEXIUM) 40 MG PACK Take 40 mg by mouth daily   Yes Historical Provider, MD   FLUoxetine (PROZAC) 10 MG tablet Take 10 mg by mouth daily     Historical Provider, MD       Current medications:    Current Facility-Administered Medications   Medication Dose Route Frequency Provider Last Rate Last Admin    ceFAZolin (ANCEF) 2000 mg in sterile water 20 mL IV syringe  2,000 mg Intravenous Once Sera Gamboa MD        lactated ringers infusion   Intravenous Continuous Esther Rowe  mL/hr at 21 0702 New Bag at 21 0702    sodium chloride flush 0.9 % injection 5-40 mL  5-40 mL Intravenous 2 times per day Esther Rowe MD        sodium chloride flush 0.9 % injection 5-40 mL  5-40 mL Intravenous PRN Esther Rowe MD        0.9 % sodium chloride infusion  25 mL Intravenous PRN Esther Rowe MD           Allergies:  No Known Allergies    Problem List:    Patient Active Problem List   Diagnosis Code    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, M22.42    Synovitis of both knee joints M65.9    Open fracture of tuft of distal phalanx of left thumb S62.522B       Past Medical History:        Diagnosis Date    Anxiety     GERD (gastroesophageal reflux disease)        Past Surgical History:        Procedure Laterality Date    CARDIAC CATHETERIZATION      SHOULDER SURGERY         Social History:    Social History     Tobacco Use    Smoking status: Current Every Day Smoker     Packs/day: 1.00     Types: Cigarettes    Smokeless tobacco: Never Used   Substance Use Topics    Alcohol use: Yes     Comment: rarely                                Ready to quit: Not Answered  Counseling given: Not Answered      Vital Signs (Current):   Vitals:    08/11/21 1555 08/16/21 0628   BP:  117/77   Pulse:  63   Resp:  16   Temp:  98.3 °F (36.8 °C)   TempSrc:  Temporal   SpO2:  99%   Weight: 220 lb (99.8 kg) 220 lb (99.8 kg)   Height: 5' 11\" (1.803 m) 5' 11\" (1.803 m)                                              BP Readings from Last 3 Encounters:   08/16/21 117/77   08/07/21 119/76   09/21/16 126/86       NPO Status: Time of last liquid consumption: 0000                        Time of last solid consumption: 0000                        Date of last liquid consumption: 08/16/21                        Date of last solid food consumption: 08/16/21    BMI:   Wt Readings from Last 3 Encounters:   08/16/21 220 lb (99.8 kg)   08/10/21 220 lb (99.8 kg)   08/07/21 220 lb (99.8 kg)     Body mass index is 30.68 kg/m². CBC: No results found for: WBC, RBC, HGB, HCT, MCV, RDW, PLT    CMP: No results found for: NA, K, CL, CO2, BUN, CREATININE, GFRAA, AGRATIO, LABGLOM, GLUCOSE, PROT, CALCIUM, BILITOT, ALKPHOS, AST, ALT    POC Tests: No results for input(s): POCGLU, POCNA, POCK, POCCL, POCBUN, POCHEMO, POCHCT in the last 72 hours.     Coags: No results found for: PROTIME, INR, APTT    HCG (If Applicable): No results found for: PREGTESTUR, PREGSERUM, HCG, HCGQUANT     ABGs: No results found for: PHART, PO2ART, SPS9AUD, XDE8UEP, BEART, P4UAVSZW     Type & Screen (If Applicable):  No results found for: LABABO, LABRH    Drug/Infectious Status (If Applicable):  No results found for: HIV, HEPCAB    COVID-19 Screening (If Applicable):   Lab Results   Component Value Date    COVID19 Not Detected 08/10/2021           Anesthesia Evaluation  Patient summary reviewed and Nursing notes reviewed  Airway: Mallampati: II  TM distance: >3 FB   Neck ROM: full  Mouth opening: > = 3 FB Dental: normal exam         Pulmonary:Negative Pulmonary ROS and normal exam  breath sounds clear to auscultation                             Cardiovascular:Negative CV ROS            Rhythm: regular  Rate: normal                    Neuro/Psych:   Negative Neuro/Psych ROS              GI/Hepatic/Renal:   (+) GERD:,           Endo/Other:    (+) : arthritis:., .                 Abdominal:   (+) obese,           Vascular: negative vascular ROS. Other Findings:             Anesthesia Plan      general     ASA 2       Induction: intravenous. MIPS: Postoperative opioids intended and Prophylactic antiemetics administered. Anesthetic plan and risks discussed with patient. Plan discussed with CRNA.                   Imelda Cross MD   8/16/2021

## 2021-08-16 NOTE — H&P
Update History & Physical    The patient's History and Physical  was reviewed with the patient and I examined the patient. There was no change. The surgical site was confirmed by the patient and me. Plan: The risks, benefits, expected outcome, and alternative to the recommended procedure have been discussed with the patient. Patient understands and wants to proceed with the procedure.      Electronically signed by Jairo James MD on 8/16/2021 at 7:21 AM

## 2021-08-18 ENCOUNTER — TELEPHONE (OUTPATIENT)
Dept: ORTHOPEDIC SURGERY | Age: 36
End: 2021-08-18

## 2021-08-20 ENCOUNTER — HOSPITAL ENCOUNTER (EMERGENCY)
Age: 36
Discharge: HOME | End: 2021-08-20
Attending: EMERGENCY MEDICINE
Payer: MEDICAID

## 2021-08-20 VITALS
SYSTOLIC BLOOD PRESSURE: 130 MMHG | HEIGHT: 70 IN | HEART RATE: 83 BPM | OXYGEN SATURATION: 99 % | TEMPERATURE: 98.1 F | WEIGHT: 233 LBS | BODY MASS INDEX: 33.36 KG/M2 | DIASTOLIC BLOOD PRESSURE: 83 MMHG | RESPIRATION RATE: 16 BRPM

## 2021-08-20 DIAGNOSIS — M79.89 LEG SWELLING: Primary | ICD-10-CM

## 2021-08-20 DIAGNOSIS — R03.0 ELEVATED BLOOD PRESSURE READING: ICD-10-CM

## 2021-08-20 DIAGNOSIS — M79.605 LEFT LEG PAIN: ICD-10-CM

## 2021-08-20 LAB
A/G RATIO: 1.5 (ref 1.1–2.2)
ALBUMIN SERPL-MCNC: 4 G/DL (ref 3.4–5)
ALP BLD-CCNC: 84 U/L (ref 40–129)
ALT SERPL-CCNC: 20 U/L (ref 10–40)
ANION GAP SERPL CALCULATED.3IONS-SCNC: 11 MMOL/L (ref 3–16)
AST SERPL-CCNC: 20 U/L (ref 15–37)
BASOPHILS ABSOLUTE: 0.1 K/UL (ref 0–0.2)
BASOPHILS RELATIVE PERCENT: 0.7 %
BILIRUB SERPL-MCNC: <0.2 MG/DL (ref 0–1)
BUN BLDV-MCNC: 10 MG/DL (ref 7–20)
CALCIUM SERPL-MCNC: 9.5 MG/DL (ref 8.3–10.6)
CHLORIDE BLD-SCNC: 102 MMOL/L (ref 99–110)
CO2: 26 MMOL/L (ref 21–32)
CREAT SERPL-MCNC: 0.9 MG/DL (ref 0.9–1.3)
D DIMER: 280 NG/ML DDU (ref 0–229)
EOSINOPHILS ABSOLUTE: 0.1 K/UL (ref 0–0.6)
EOSINOPHILS RELATIVE PERCENT: 1 %
GFR AFRICAN AMERICAN: >60
GFR NON-AFRICAN AMERICAN: >60
GLOBULIN: 2.6 G/DL
GLUCOSE BLD-MCNC: 106 MG/DL (ref 70–99)
HCT VFR BLD CALC: 43.7 % (ref 40.5–52.5)
HEMOGLOBIN: 15.2 G/DL (ref 13.5–17.5)
LYMPHOCYTES ABSOLUTE: 3.2 K/UL (ref 1–5.1)
LYMPHOCYTES RELATIVE PERCENT: 27.6 %
MCH RBC QN AUTO: 30.5 PG (ref 26–34)
MCHC RBC AUTO-ENTMCNC: 34.9 G/DL (ref 31–36)
MCV RBC AUTO: 87.5 FL (ref 80–100)
MONOCYTES ABSOLUTE: 1.2 K/UL (ref 0–1.3)
MONOCYTES RELATIVE PERCENT: 9.9 %
NEUTROPHILS ABSOLUTE: 7.1 K/UL (ref 1.7–7.7)
NEUTROPHILS RELATIVE PERCENT: 60.8 %
PDW BLD-RTO: 13.2 % (ref 12.4–15.4)
PLATELET # BLD: 247 K/UL (ref 135–450)
PMV BLD AUTO: 8 FL (ref 5–10.5)
POTASSIUM REFLEX MAGNESIUM: 4.2 MMOL/L (ref 3.5–5.1)
RBC # BLD: 4.99 M/UL (ref 4.2–5.9)
SODIUM BLD-SCNC: 139 MMOL/L (ref 136–145)
TOTAL PROTEIN: 6.6 G/DL (ref 6.4–8.2)
WBC # BLD: 11.6 K/UL (ref 4–11)

## 2021-08-20 PROCEDURE — 99283 EMERGENCY DEPT VISIT LOW MDM: CPT

## 2021-08-20 PROCEDURE — 80053 COMPREHEN METABOLIC PANEL: CPT

## 2021-08-20 PROCEDURE — 85379 FIBRIN DEGRADATION QUANT: CPT

## 2021-08-20 PROCEDURE — 6370000000 HC RX 637 (ALT 250 FOR IP): Performed by: EMERGENCY MEDICINE

## 2021-08-20 PROCEDURE — 85025 COMPLETE CBC W/AUTO DIFF WBC: CPT

## 2021-08-20 PROCEDURE — 36415 COLL VENOUS BLD VENIPUNCTURE: CPT

## 2021-08-20 RX ORDER — HYDROCODONE BITARTRATE AND ACETAMINOPHEN 5; 325 MG/1; MG/1
1 TABLET ORAL ONCE
Status: COMPLETED | OUTPATIENT
Start: 2021-08-20 | End: 2021-08-20

## 2021-08-20 RX ADMIN — HYDROCODONE BITARTRATE AND ACETAMINOPHEN 1 TABLET: 5; 325 TABLET ORAL at 15:48

## 2021-08-20 RX ADMIN — APIXABAN 10 MG: 5 TABLET, FILM COATED ORAL at 16:54

## 2021-08-20 RX ADMIN — APIXABAN 80 MG: 5 TABLET, FILM COATED ORAL at 16:55

## 2021-08-20 ASSESSMENT — PAIN DESCRIPTION - DESCRIPTORS: DESCRIPTORS: ACHING;CRAMPING

## 2021-08-20 ASSESSMENT — PAIN DESCRIPTION - LOCATION: LOCATION: KNEE

## 2021-08-20 ASSESSMENT — PAIN SCALES - GENERAL: PAINLEVEL_OUTOF10: 8

## 2021-08-20 ASSESSMENT — PAIN DESCRIPTION - PAIN TYPE: TYPE: ACUTE PAIN

## 2021-08-20 ASSESSMENT — PAIN DESCRIPTION - FREQUENCY: FREQUENCY: CONTINUOUS

## 2021-08-20 NOTE — ED PROVIDER NOTES
[facility] EMERGENCY DEPARTMENT      CHIEF COMPLAINT  Leg Pain (pt states he had left knee tear repair on Monday. states his calf is \"locking up\". pt states the on call surgeon for Dr Michelle Lyons told them to come to the ER last night. )       HISTORY OF PRESENT ILLNESS  Kipp Nissen is a 39 y.o. male  who presents to the ED complaining of left leg swelling, calf discomfort and pain status post left knee arthroscopic surgery for Left knee video arthroscopy with partial lateral  meniscectomy and medial plica excision by Dr. Michelle Lyons with orthopedics on Monday. Patient states that he is 50% weightbearing and has crutches. He is not having fevers. No chest pain or shortness of breath. He has been taking Norco for pain. He last took pain medication this morning. He is able to flex at the knee somewhat. He has no history of DVT or PE. His pain is been controlled with his pain medication at home. He called the on-call provider last night and they told him to apply heat massage and and if not improved to go to the ER. Due to persistent symptoms he presents now this afternoon for further evaluation. He has not yet called back his orthopedic surgeon. No other complaints, modifying factors or associated symptoms. I have reviewed the following from the nursing documentation. Past Medical History:   Diagnosis Date    Anxiety     GERD (gastroesophageal reflux disease)      Past Surgical History:   Procedure Laterality Date    CARDIAC CATHETERIZATION      KNEE ARTHROSCOPY Left 8/16/2021    LEFT KNEE VIDEO ARTHROSCOPY, CHONDROPLASTY OF PATELLA, SYNOVECTOMY performed by Cyril Mohs, MD at [address] [place] [address]  08/16/2021    LEFT KNEE VIDEO ARTHROSCOPY, CHONDROPLASTY OF PATELLA, SYNOVECTOMY     OTHER SURGICAL HISTORY      LEFT KNEE VIDEO ARTHROSCOPY, CHONDROPLASTY OF PATELLA, SYNOVECTOMY    SHOULDER SURGERY       History reviewed. No pertinent family history.   Social History     Socioeconomic History    Marital status:      Spouse name: Not on file    Number of children: Not on file    Years of education: Not on file    Highest education level: Not on file   Occupational History    Not on file   Tobacco Use    Smoking status: Current Every Day Smoker     Packs/day: 1.50     Types: Cigarettes    Smokeless tobacco: Never Used   Substance and Sexual Activity    Alcohol use: Yes     Comment: rarely    Drug use: No    Sexual activity: Yes     Partners: Female   Other Topics Concern    Not on file   Social History Narrative    Not on file     Social Determinants of Health     Financial Resource Strain:     Difficulty of Paying Living Expenses:    Food Insecurity:     Worried About Running Out of Food in the Last Year:     [address] in the Last Year:    Transportation Needs:     Lack of Transportation (Medical):      Lack of Transportation (Non-Medical):    Physical Activity:     Days of Exercise per Week:     Minutes of Exercise per Session:    Stress:     Feeling of Stress :    Social Connections:     Frequency of Communication with Friends and Family:     Frequency of Social Gatherings with Friends and Family:     Attends [employer] Services:     Active Member of Clubs or Organizations:     Attends Club or Organization Meetings:     Marital Status:    Intimate Partner Violence:     Fear of Current or Ex-Partner:     Emotionally Abused:     Physically Abused:     Sexually Abused:      Current Facility-Administered Medications   Medication Dose Route Frequency Provider Last Rate Last Admin    apixaban (ELIQUIS) tablet 10 mg  10 mg Oral Once Brayden Pinzon MD        And    apixaban (ELIQUIS) 5 mg tablets (ED 4 day apixaban DVT pack)  1 Package Oral RX Placeholder Brayden Pinzon MD         Current Outpatient Medications   Medication Sig Dispense Refill    HYDROcodone-acetaminophen (NORCO) 7.5-325 MG per tablet Take 1 tablet by mouth every 6 hours as needed for Pain for up to 7 days. Intended supply: 7 days. Take lowest dose possible to manage pain 28 tablet 0    meloxicam (MOBIC) 15 MG tablet Take 15 mg by mouth daily      esomeprazole Magnesium (NEXIUM) 40 MG PACK Take 40 mg by mouth daily      FLUoxetine (PROZAC) 10 MG tablet Take 10 mg by mouth daily        No Known Allergies    REVIEW OF SYSTEMS  10 systems reviewed, pertinent positives per HPI otherwise noted to be negative. PHYSICAL EXAM  BP (!) 139/104   Pulse 79   Temp 98.1 °F (36.7 °C) (Oral)   Resp 16   Ht 5' 10\" (1.778 m)   Wt 233 lb (105.7 kg)   SpO2 98%   BMI 33.43 kg/m²    GENERAL APPEARANCE: Awake and alert. Cooperative. No acute distress. HENT: Normocephalic. Atraumatic. Mucous membranes are moist.  No drooling or stridor. NECK: Supple. EYES: PERRL. EOM's grossly intact. HEART/CHEST: RRR. No murmurs. 2+ DP and PT pulses bilaterally. LUNGS: Respirations unlabored. CTAB. Good air exchange. Speaking comfortably in full sentences. ABDOMEN: No tenderness. Soft. Non-distended. No masses. No organomegaly. No guarding or rebound. MUSCULOSKELETAL: Compartments soft. Mild left lower extremity edema compared to the right with tenderness with palpation of the calf. No palpable cords in the popliteal fossa. Steri-Strips intact anteriorly on the medial and lateral aspect. No erythema or warmth. Patient able to flex the knee to approximately 120 degrees. He is able to fully extend the knee. All extremities neurovascularly intact. SKIN: Warm and dry. Erythematous papular rash noted to the bilateral lower extremities around the ankle area which patient states has been present ever since weed whacking in flip-flops. NEUROLOGICAL: Alert and oriented. CN's 2-12 intact. No gross facial drooping. Strength 5/5, sensation intact. No gross focal deficits. PSYCHIATRIC: Normal mood and affect. LABS  I have reviewed all labs for this visit.    Results for orders placed or performed during the hospital encounter of 08/20/21   D-dimer, quantitative   Result Value Ref Range    D-Dimer, Quant 280 (H) 0 - 229 ng/mL DDU       RADIOLOGY  None    ED COURSE/MDM  Patient seen and evaluated. Old records reviewed. Labs reviewed and results discussed with patient. Patient presenting for evaluation of left calf pain and swelling status post knee surgery 5 days ago. Certainly DVT is high in the differential but unfortunately we do not have capability to perform duplex ultrasound here at this facility and given the hour the day unfortunately even transfer to [facility] would be unsuccessful in obtaining this today due to the hour of the day as the vascular team is only available until 4 PM to provide this service and imaging. I did obtain a D-dimer to further restratify and it was noted to be elevated. For that reason after discussion of the risks and benefits of initiating empiric anticoagulation therapy, patient has elected to start Eliquis with the intent of obtaining a vascular ultrasound tomorrow morning if able, otherwise on Monday morning first thing that he is able to schedule. I will discharge him on a 4-day pack which he can discontinue if negative, and if positive will follow closely with his orthopedic surgeon and/or PCP. Patient verbalized understanding and agreement of that plan. There is no evidence of cellulitis or any postsurgical infection. No chest pain or shortness of breath, hypoxia or tachycardia. He was noted to have an elevated blood pressure reading which can be rechecked as an outpatient as well. All questions were answered at time of discharge. I estimate there is LOW risk for COMPARTMENT SYNDROME, SEPTIC ARTHRITIS, TENDON OR NEUROVASCULAR INJURY, thus I consider the discharge disposition reasonable.  Vinny Villar and I have discussed the diagnosis and risks, and we agree with discharging home to follow-up with their primary doctor or the referral orthopedist. We also discussed returning to the Emergency Department immediately if new or worsening symptoms occur. We have discussed the symptoms which are most concerning (e.g., changing or worsening pain, numbness, weakness) that necessitate immediate return. During the patient's ED course, the patient was given:  Medications   apixaban (ELIQUIS) tablet 10 mg (has no administration in time range)     And   apixaban (ELIQUIS) 5 mg tablets (ED 4 day apixaban DVT pack) (has no administration in time range)   HYDROcodone-acetaminophen (NORCO) 5-325 MG per tablet 1 tablet (1 tablet Oral Given 8/20/21 5629)        CLINICAL IMPRESSION  1. Leg swelling    2. Left leg pain    3. Elevated blood pressure reading        Blood pressure 130/83, pulse 83, temperature 98.1 °F (36.7 °C), temperature source Oral, resp. rate 16, height 5' 10\" (1.778 m), weight 233 lb (105.7 kg), SpO2 99 %. DISPOSITION  Odessa Araujo was discharged to home in stable condition. Patient was given scripts for the following medications. I counseled patient how to take these medications. New Prescriptions    No medications on file       Follow-up with:  Isabell Miller  [address] [place] [address] [place]  [phone]      As needed    Darryl Colin MD  [address]  [phone]    Call in 1 day  For recheck    Central scheduling  Please call [phone] to schedule your outpatient ultrasound  Call in 1 day        DISCLAIMER: This chart was created using Dragon dictation software. Efforts were made by me to ensure accuracy, however some errors may be present due to limitations of this technology and occasionally words are not transcribed correctly.         Alpha Boeck, MD  08/20/21 6619

## 2021-08-20 NOTE — ED NOTES
Reviewed patient discharge instructions at this time, copy given to patient. No questions or concerns. Patient voiced understanding.         Kaylyn Noble RN  08/20/21 6102

## 2021-08-23 ENCOUNTER — TELEPHONE (OUTPATIENT)
Dept: ORTHOPEDIC SURGERY | Age: 36
End: 2021-08-23

## 2021-08-23 NOTE — ED NOTES
Received call from [facility] central scheduling, printed order for vascular ultrasound and faxed to [phone].      Charles Dennis, EMT-P  08/23/21 4540

## 2021-08-23 NOTE — TELEPHONE ENCOUNTER
[address] PATIENT IS POSITIVE FOR DVT, DID CALL [facility] OFFICE FOR FURTHER TREATMENT, SPOKE TO PROSPER. WILL MAKE PATIENT AWARE.

## 2021-08-23 NOTE — TELEPHONE ENCOUNTER
General Question     Subject: PATIENT HAD RECENT SX FOR KNEE, IS HAVING CALF PAIN AND SWELLING A SAPSMS, WAS AT ER OVER THE WEEKEND, WOULD LIKE TO KNOW WHAT TO DO  Patient and /or Facility Request: PATIENT  Contact Number: [phone]

## 2021-08-25 ENCOUNTER — OFFICE VISIT (OUTPATIENT)
Dept: ORTHOPEDIC SURGERY | Age: 36
End: 2021-08-25

## 2021-08-25 VITALS — WEIGHT: 233 LBS | HEIGHT: 70 IN | BODY MASS INDEX: 33.36 KG/M2

## 2021-08-25 DIAGNOSIS — S83.105D DISLOCATION OF LEFT KNEE WITH MEDIAL MENISCUS TEAR, SUBSEQUENT ENCOUNTER: ICD-10-CM

## 2021-08-25 DIAGNOSIS — S83.242D DISLOCATION OF LEFT KNEE WITH MEDIAL MENISCUS TEAR, SUBSEQUENT ENCOUNTER: ICD-10-CM

## 2021-08-25 DIAGNOSIS — M65.9 SYNOVITIS OF BOTH KNEE JOINTS: Primary | ICD-10-CM

## 2021-08-25 PROCEDURE — 99024 POSTOP FOLLOW-UP VISIT: CPT | Performed by: ORTHOPAEDIC SURGERY

## 2021-08-26 ENCOUNTER — TELEPHONE (OUTPATIENT)
Dept: ORTHOPEDIC SURGERY | Age: 36
End: 2021-08-26

## 2021-08-26 PROBLEM — S83.282A ACUTE LATERAL MENISCUS TEAR OF LEFT KNEE: Status: ACTIVE | Noted: 2021-08-26

## 2021-08-26 NOTE — TELEPHONE ENCOUNTER
[facility] ( Alfonso Armenta ) 08/25/2021 & 08/16/2021 OP REPORT TO IGNACIO DE LA ROSA TO SCAN IN [facility]  [address]     E-MAILED [facility] TO FILL OUT FOR [address]

## 2021-09-08 ENCOUNTER — OFFICE VISIT (OUTPATIENT)
Dept: ORTHOPEDIC SURGERY | Age: 36
End: 2021-09-08
Payer: MEDICAID

## 2021-09-08 VITALS — HEIGHT: 70 IN | BODY MASS INDEX: 33.36 KG/M2 | WEIGHT: 233 LBS

## 2021-09-08 DIAGNOSIS — M22.42 CHONDROMALACIA OF BOTH PATELLAE: Primary | ICD-10-CM

## 2021-09-08 DIAGNOSIS — S83.282D ACUTE LATERAL MENISCUS TEAR OF LEFT KNEE, SUBSEQUENT ENCOUNTER: ICD-10-CM

## 2021-09-08 DIAGNOSIS — M22.41 CHONDROMALACIA OF BOTH PATELLAE: Primary | ICD-10-CM

## 2021-09-08 PROBLEM — S83.242A DISLOCATION OF LEFT KNEE WITH MEDIAL MENISCUS TEAR: Status: ACTIVE | Noted: 2021-09-08

## 2021-09-08 PROBLEM — S83.105A DISLOCATION OF LEFT KNEE WITH MEDIAL MENISCUS TEAR: Status: ACTIVE | Noted: 2021-09-08

## 2021-09-08 PROCEDURE — L1810 KO ELASTIC WITH JOINTS: HCPCS | Performed by: ORTHOPAEDIC SURGERY

## 2021-09-08 PROCEDURE — 99024 POSTOP FOLLOW-UP VISIT: CPT | Performed by: ORTHOPAEDIC SURGERY

## 2021-09-08 RX ORDER — METHYLPREDNISOLONE 4 MG/1
TABLET ORAL
Qty: 1 KIT | Refills: 0 | Status: SHIPPED | OUTPATIENT
Start: 2021-09-08 | End: 2021-12-16

## 2021-09-20 ENCOUNTER — TELEPHONE (OUTPATIENT)
Dept: ORTHOPEDIC SURGERY | Age: 36
End: 2021-09-20

## 2021-09-21 ENCOUNTER — OFFICE VISIT (OUTPATIENT)
Dept: ORTHOPEDIC SURGERY | Age: 36
End: 2021-09-21
Payer: MEDICAID

## 2021-09-21 VITALS — BODY MASS INDEX: 33.36 KG/M2 | WEIGHT: 233 LBS | RESPIRATION RATE: 16 BRPM | HEIGHT: 70 IN

## 2021-09-21 DIAGNOSIS — S62.522B OPEN FRACTURE OF TUFT OF DISTAL PHALANX OF LEFT THUMB: Primary | ICD-10-CM

## 2021-09-21 DIAGNOSIS — F17.200 CURRENT SMOKER: ICD-10-CM

## 2021-09-21 PROCEDURE — 99406 BEHAV CHNG SMOKING 3-10 MIN: CPT | Performed by: ORTHOPAEDIC SURGERY

## 2021-09-21 PROCEDURE — G8417 CALC BMI ABV UP PARAM F/U: HCPCS | Performed by: ORTHOPAEDIC SURGERY

## 2021-09-21 PROCEDURE — 4004F PT TOBACCO SCREEN RCVD TLK: CPT | Performed by: ORTHOPAEDIC SURGERY

## 2021-09-21 PROCEDURE — 99024 POSTOP FOLLOW-UP VISIT: CPT | Performed by: ORTHOPAEDIC SURGERY

## 2021-09-21 PROCEDURE — G8427 DOCREV CUR MEDS BY ELIG CLIN: HCPCS | Performed by: ORTHOPAEDIC SURGERY

## 2021-09-22 PROBLEM — F17.200 CURRENT SMOKER: Status: ACTIVE | Noted: 2021-09-22

## 2021-09-22 NOTE — PROGRESS NOTES
CHIEF COMPLAINT: Left hand thumb pain/ thumb distal phalanx open fracture. DATE OF INJURY: 8/7/2021    HISTORY:  Mr. Cuevas Alu 39 y.o.  male right handed presents today for f/u evaluation of a left hand injury which occurred when he hit his thumb with a mallet. He noticed blood coming from the nail. He reported no more thumb pain and swelling. He rates his pain a 0/10 VAS. He finished antibiotics. No other complaint. He was seen at [facility], was evaluated, started on antibiotics and given a tetanus shot and splinted and asked to see orthopedics. He has a heavy duty job. He is currently off work after knee surgery by Dr. Eliza Moore on 8/16/2021 and also he developed DVT and now on Eliquis. He has a smoker 1 pack of cigarettes a day.     Past Medical History:   Diagnosis Date    Anxiety     GERD (gastroesophageal reflux disease)        Past Surgical History:   Procedure Laterality Date    CARDIAC CATHETERIZATION      KNEE ARTHROSCOPY Left 8/16/2021    LEFT KNEE VIDEO ARTHROSCOPY, CHONDROPLASTY OF PATELLA, SYNOVECTOMY performed by Reza Carter MD at [address] [facility]  08/16/2021    LEFT KNEE VIDEO ARTHROSCOPY, CHONDROPLASTY OF PATELLA, SYNOVECTOMY     OTHER SURGICAL HISTORY      LEFT KNEE VIDEO ARTHROSCOPY, CHONDROPLASTY OF PATELLA, SYNOVECTOMY    SHOULDER SURGERY         Social History     Socioeconomic History    Marital status:      Spouse name: Not on file    Number of children: Not on file    Years of education: Not on file    Highest education level: Not on file   Occupational History    Not on file   Tobacco Use    Smoking status: Current Every Day Smoker     Packs/day: 1.50     Types: Cigarettes    Smokeless tobacco: Never Used   Substance and Sexual Activity    Alcohol use: Yes     Comment: rarely    Drug use: No    Sexual activity: Yes     Partners: Female   Other Topics Concern    Not on file   Social History Narrative    Not on file is 16.     Examination of the gait, showed that the patient walks with no limp . Examination of both upper extremities showing a decreased range of motion of the left thumb compare to the other side. There is moderate swelling that can be seen, as well as ecchymosis of the thumb. There is an area near the radial side of the thumb near the nail with small laceration with Dermabond. He has intact sensation and good radial pulses. He has significant tenderness on deep palpation over the distal phalanx of the thumb left hand. There is no rotational deformity of the left thumb. IMAGING: Lacey Corrales were reviewed, taken today in the office, 3 views of the left hand, and showed a thumb distal phalanx minimally displaced tuft fracture. IMPRESSION: Left hand thumb distal phalanx minimally displaced open fracture. PLAN:  I discussed that the overall alignment of this fracture is still good and healing well. We discussed the risk of nonunion and or malunion and also DVT. ROM and hand exercises. NSAIDs OTC. We will see him  back in 2 months PRN at which time we will get a new xray of the left hand. The patient smokes, and we discussed with the patient the risks of smoking on general health and also on bone and soft tissue healing (delay and non-union), and promised to cut down or stop smoking. Smoking: Educated the patient regarding the hazards of smoking and that it harms their body in many ways. It increases the chance of developing heart disease, lung disease, cancer, and other health problems including poor bone and wound healing. The importance of smoking cessation for optimal bone and wound healing was stressed. This was communicated verbally, 5 Minutes.       Harriet Collazo MD

## 2021-10-06 ENCOUNTER — OFFICE VISIT (OUTPATIENT)
Dept: ORTHOPEDIC SURGERY | Age: 36
End: 2021-10-06

## 2021-10-06 VITALS — BODY MASS INDEX: 33.36 KG/M2 | WEIGHT: 233 LBS | HEIGHT: 70 IN

## 2021-10-06 DIAGNOSIS — S83.282D ACUTE LATERAL MENISCUS TEAR OF LEFT KNEE, SUBSEQUENT ENCOUNTER: Primary | ICD-10-CM

## 2021-10-06 PROCEDURE — 99024 POSTOP FOLLOW-UP VISIT: CPT | Performed by: ORTHOPAEDIC SURGERY

## 2021-10-06 NOTE — LETTER
[address] [place] [address]  Phone: [phone]  Fax: [phone]    Mary Herrera MD        October 6, 2021     Patient: Tamika Robertson   YOB: 1985   Date of Visit: 10/6/2021       To Whom It May Concern: It is my medical opinion that Jona Rock should remain out of work until 11/01/2021. If you have any questions or concerns, please don't hesitate to call.     Sincerely,        Mary Herrera MD

## 2021-10-06 NOTE — PROGRESS NOTES
He returns today for evaluation. He seems to be doing better but he still is recovering from his DVT. At this time he should be off of his anticoagulants by the end of November at which point he should be able to return to work. I will switch his brace for better fitting one today.   He should remain off work and return here in 2 months

## 2021-10-26 ENCOUNTER — TELEPHONE (OUTPATIENT)
Dept: ORTHOPEDIC SURGERY | Age: 36
End: 2021-10-26

## 2021-10-26 NOTE — TELEPHONE ENCOUNTER
Other   10/26/21 Attrny office clld inq abt narrative report reqst.    Imported narrative report into  and snt inquiry to clinic to respond to [facility] [phone]

## 2021-11-04 ENCOUNTER — OFFICE VISIT (OUTPATIENT)
Dept: ORTHOPEDIC SURGERY | Age: 36
End: 2021-11-04
Payer: MEDICAID

## 2021-11-04 ENCOUNTER — TELEPHONE (OUTPATIENT)
Dept: ORTHOPEDIC SURGERY | Age: 36
End: 2021-11-04

## 2021-11-04 VITALS — HEIGHT: 70 IN | BODY MASS INDEX: 33.36 KG/M2 | WEIGHT: 233 LBS

## 2021-11-04 DIAGNOSIS — M22.42 CHONDROMALACIA OF BOTH PATELLAE: ICD-10-CM

## 2021-11-04 DIAGNOSIS — M22.41 CHONDROMALACIA OF BOTH PATELLAE: ICD-10-CM

## 2021-11-04 DIAGNOSIS — S83.282D ACUTE LATERAL MENISCUS TEAR OF LEFT KNEE, SUBSEQUENT ENCOUNTER: Primary | ICD-10-CM

## 2021-11-04 DIAGNOSIS — M65.9 SYNOVITIS OF BOTH KNEE JOINTS: ICD-10-CM

## 2021-11-04 PROCEDURE — 4004F PT TOBACCO SCREEN RCVD TLK: CPT | Performed by: ORTHOPAEDIC SURGERY

## 2021-11-04 PROCEDURE — G8417 CALC BMI ABV UP PARAM F/U: HCPCS | Performed by: ORTHOPAEDIC SURGERY

## 2021-11-04 PROCEDURE — G8484 FLU IMMUNIZE NO ADMIN: HCPCS | Performed by: ORTHOPAEDIC SURGERY

## 2021-11-04 PROCEDURE — G8427 DOCREV CUR MEDS BY ELIG CLIN: HCPCS | Performed by: ORTHOPAEDIC SURGERY

## 2021-11-04 PROCEDURE — 99212 OFFICE O/P EST SF 10 MIN: CPT | Performed by: ORTHOPAEDIC SURGERY

## 2021-11-04 NOTE — PROGRESS NOTES
Returns today for evaluation. He still is having issues with endurance to both legs. His left leg is slightly improving but he wears a brace on for protection but continues to have endurance issues. At this time his right knee continues to hurt and the hope initially was to explore both knees with arthroscopy but with the onset of a blood clot in his left leg and is need to be on anticoagulant, further intervention was delayed. He says he is supposed to get off of his anticoagulant at the end of this month, and the hope would be that we could request surgical intervention to his right knee after that time. He should return in 1 month.

## 2021-11-23 ENCOUNTER — OFFICE VISIT (OUTPATIENT)
Dept: ORTHOPEDIC SURGERY | Age: 36
End: 2021-11-23
Payer: MEDICAID

## 2021-11-23 VITALS — WEIGHT: 233 LBS | BODY MASS INDEX: 33.36 KG/M2 | HEIGHT: 70 IN

## 2021-11-23 DIAGNOSIS — F17.200 CURRENT SMOKER: ICD-10-CM

## 2021-11-23 DIAGNOSIS — S62.522B OPEN FRACTURE OF TUFT OF DISTAL PHALANX OF LEFT THUMB: Primary | ICD-10-CM

## 2021-11-23 PROCEDURE — G8427 DOCREV CUR MEDS BY ELIG CLIN: HCPCS | Performed by: ORTHOPAEDIC SURGERY

## 2021-11-23 PROCEDURE — G8484 FLU IMMUNIZE NO ADMIN: HCPCS | Performed by: ORTHOPAEDIC SURGERY

## 2021-11-23 PROCEDURE — 99406 BEHAV CHNG SMOKING 3-10 MIN: CPT | Performed by: ORTHOPAEDIC SURGERY

## 2021-11-23 PROCEDURE — 4004F PT TOBACCO SCREEN RCVD TLK: CPT | Performed by: ORTHOPAEDIC SURGERY

## 2021-11-23 PROCEDURE — G8417 CALC BMI ABV UP PARAM F/U: HCPCS | Performed by: ORTHOPAEDIC SURGERY

## 2021-11-23 PROCEDURE — 99213 OFFICE O/P EST LOW 20 MIN: CPT | Performed by: ORTHOPAEDIC SURGERY

## 2021-11-26 NOTE — PROGRESS NOTES
CHIEF COMPLAINT: Left hand thumb pain/ thumb distal phalanx open fracture. DATE OF INJURY: 8/7/2021    HISTORY:  Mr. Abdifatah Abebe 39 y.o.  male right handed presents today for f/u evaluation of a left hand injury which occurred when he hit his thumb with a mallet. He noticed blood coming from the nail. He reported no more thumb pain and swelling. He rates his pain a 0/10 VAS. He finished antibiotics. No other complaint. He was seen at [facility], was evaluated, started on antibiotics and given a tetanus shot and splinted and asked to see orthopedics. He has a heavy duty job. He is currently off work after knee surgery by Dr. Guera Hale on 8/16/2021 and also he developed DVT and now on Eliquis. He has a smoker 1 pack of cigarettes a day.     Past Medical History:   Diagnosis Date    Anxiety     GERD (gastroesophageal reflux disease)        Past Surgical History:   Procedure Laterality Date    CARDIAC CATHETERIZATION      KNEE ARTHROSCOPY Left 8/16/2021    LEFT KNEE VIDEO ARTHROSCOPY, CHONDROPLASTY OF PATELLA, SYNOVECTOMY performed by Kenn Waite MD at [address] [place] [address]    OTHER SURGICAL HISTORY  08/16/2021    LEFT KNEE VIDEO ARTHROSCOPY, CHONDROPLASTY OF PATELLA, SYNOVECTOMY     OTHER SURGICAL HISTORY      LEFT KNEE VIDEO ARTHROSCOPY, CHONDROPLASTY OF PATELLA, SYNOVECTOMY    SHOULDER SURGERY         Social History     Socioeconomic History    Marital status:      Spouse name: Not on file    Number of children: Not on file    Years of education: Not on file    Highest education level: Not on file   Occupational History    Not on file   Tobacco Use    Smoking status: Current Every Day Smoker     Packs/day: 1.50     Types: Cigarettes    Smokeless tobacco: Never Used   Substance and Sexual Activity    Alcohol use: Yes     Comment: rarely    Drug use: No    Sexual activity: Yes     Partners: Female   Other Topics Concern    Not on file   Social History Narrative  Not on file     Social Determinants of Health     Financial Resource Strain:     Difficulty of Paying Living Expenses: Not on file   Food Insecurity:     Worried About Running Out of Food in the Last Year: Not on file    Rocco of Food in the Last Year: Not on file   Transportation Needs:     Lack of Transportation (Medical): Not on file    Lack of Transportation (Non-Medical): Not on file   Physical Activity:     Days of Exercise per Week: Not on file    Minutes of Exercise per Session: Not on file   Stress:     Feeling of Stress : Not on file   Social Connections:     Frequency of Communication with Friends and Family: Not on file    Frequency of Social Gatherings with Friends and Family: Not on file    Attends [employer] Services: Not on file    Active Member of [address] [place] [address] [employer] or Organizations: Not on file    Attends Club or Organization Meetings: Not on file    Marital Status: Not on file   Intimate Partner Violence:     Fear of Current or Ex-Partner: Not on file    Emotionally Abused: Not on file    Physically Abused: Not on file    Sexually Abused: Not on file   Housing Stability:     Unable to Pay for Housing in the Last Year: Not on file    Number of [place] in the Last Year: Not on file    Unstable Housing in the Last Year: Not on file       History reviewed. No pertinent family history. Current Outpatient Medications on File Prior to Visit   Medication Sig Dispense Refill    methylPREDNISolone (MEDROL, GANGA,) 4 MG tablet Take by mouth. 1 kit 0    meloxicam (MOBIC) 15 MG tablet Take 15 mg by mouth daily (Patient not taking: Reported on 8/25/2021)      esomeprazole Magnesium (NEXIUM) 40 MG PACK Take 40 mg by mouth daily      FLUoxetine (PROZAC) 10 MG tablet Take 10 mg by mouth daily        No current facility-administered medications on file prior to visit.        Pertinent items are noted in HPI  Review of systems reviewed from Patient History Form dated on 8/10/2021 and available in the patient's chart under the Media tab. No change noted. PHYSICAL EXAMINATION:  Mr. Olamide Chavira is a very pleasant 39 y.o.  male who presents today in no acute distress, awake, alert, and oriented. He is well dressed, nourished and  groomed. Patient with normal affect. Height is  5' 10\" (1.778 m), weight is 233 lb (105.7 kg), Body mass index is 33.43 kg/m². Resting respiratory rate is 16. Examination of the gait, showed that the patient walks with no limp . Examination of both upper extremities showing a full range of motion of the left thumb compare to the other side. There is minimal swelling that can be seen, no ecchymosis of the thumb. There is an area near the radial side of the thumb near the nail with small laceration healed well. He has intact sensation and good radial pulses. He has no tenderness on deep palpation over the distal phalanx of the thumb left hand. There is no rotational deformity of the left thumb. [facility], taken today in the office, 3 views of the left hand, and showed a thumb distal phalanx minimally displaced tuft fracture. IMPRESSION: Left hand thumb distal phalanx minimally displaced open fracture. PLAN:  I discussed that the overall alignment of this fracture is still good and healing well. We discussed the risk of nonunion and or malunion. ROM and hand exercises. NSAIDs OTC. He can go back to normal activity with no restrictions. He will be seen PRN. I told the patient that it is not unusual to have some achy pain and swelling for up to a year after a fracture. The patient smokes, and we discussed with the patient the risks of smoking on general health and also on bone and soft tissue healing (delay and non-union), and promised to cut down or stop smoking. Smoking: Educated the patient regarding the hazards of smoking and that it harms their body in many ways.  It increases the chance of developing heart disease, lung disease, cancer, and other health problems including poor bone and wound healing. The importance of smoking cessation for optimal bone and wound healing was stressed. This was communicated verbally, 5 Minutes.       Lilli Salinas MD

## 2021-12-02 ENCOUNTER — OFFICE VISIT (OUTPATIENT)
Dept: ORTHOPEDIC SURGERY | Age: 36
End: 2021-12-02
Payer: MEDICAID

## 2021-12-02 VITALS — BODY MASS INDEX: 33.36 KG/M2 | HEIGHT: 70 IN | WEIGHT: 233 LBS

## 2021-12-02 DIAGNOSIS — M22.42 CHONDROMALACIA OF BOTH PATELLAE: Primary | ICD-10-CM

## 2021-12-02 DIAGNOSIS — M22.41 CHONDROMALACIA OF BOTH PATELLAE: Primary | ICD-10-CM

## 2021-12-02 DIAGNOSIS — M22.41 CHONDROMALACIA OF PATELLA, RIGHT: ICD-10-CM

## 2021-12-02 PROCEDURE — 99212 OFFICE O/P EST SF 10 MIN: CPT | Performed by: ORTHOPAEDIC SURGERY

## 2021-12-02 NOTE — PROGRESS NOTES
He returns today for evaluation. Some issues with both knees with a lot of grinding in the patellofemoral joint of the right knee consistent with chondromalacia patella. He now is off of anticoagulants and would like to proceed with his right knee surgery. At this time I would recommend proceeding with right knee video arthroscopy with chondroplasty of the patella. I told him also I would likely place him on Eliquis 5 mg twice daily for a total of 3 weeks postoperatively for DVT prophylaxis. He is comfortable without this recommendation. INFORMED CONSENT NOTE        We discussed the risks, benefits, and alternatives to the proposed procedure, as well as the necessity of other members of the healthcare team participating in the procedure. All questions were answered and the patient elected to proceed with the proposed procedure and signed the informed consent form. The patient was counseled at length about the risks of simba Covid-19 during their perioperative period and any recovery window from their procedure. The patient was made aware that simba Covid-19  may worsen their prognosis for recovering from their procedure  and lend to a higher morbidity and/or mortality risk. All material risks, benefits, and reasonable alternatives including postponing the procedure were discussed. The patient does wish to proceed with the procedure at this time.

## 2021-12-03 ENCOUNTER — TELEPHONE (OUTPATIENT)
Dept: ORTHOPEDIC SURGERY | Age: 36
End: 2021-12-03

## 2021-12-08 NOTE — TELEPHONE ENCOUNTER
Auth: # [id number]    Date: 12/27/21 thru 3/27/22  Type of SX:  Outpatient  Location: [facility]  CPT: 16671   DX Code: M22.41  SX area: Rt knee  Insurance: [place] [employer]

## 2021-12-16 ENCOUNTER — ANESTHESIA EVENT (OUTPATIENT)
Dept: OPERATING ROOM | Age: 36
End: 2021-12-16
Payer: MEDICAID

## 2021-12-22 ENCOUNTER — HOSPITAL ENCOUNTER (OUTPATIENT)
Age: 36
Discharge: HOME | End: 2021-12-22
Payer: MEDICAID

## 2021-12-22 PROCEDURE — U0005 INFEC AGEN DETEC AMPLI PROBE: HCPCS

## 2021-12-22 PROCEDURE — U0003 INFECTIOUS AGENT DETECTION BY NUCLEIC ACID (DNA OR RNA); SEVERE ACUTE RESPIRATORY SYNDROME CORONAVIRUS 2 (SARS-COV-2) (CORONAVIRUS DISEASE [COVID-19]), AMPLIFIED PROBE TECHNIQUE, MAKING USE OF HIGH THROUGHPUT TECHNOLOGIES AS DESCRIBED BY CMS-2020-01-R: HCPCS

## 2021-12-23 LAB — SARS-COV-2: NOT DETECTED

## 2021-12-27 ENCOUNTER — ANESTHESIA (OUTPATIENT)
Dept: OPERATING ROOM | Age: 36
End: 2021-12-27
Payer: MEDICAID

## 2021-12-27 ENCOUNTER — HOSPITAL ENCOUNTER (OUTPATIENT)
Age: 36
Setting detail: OUTPATIENT SURGERY
Discharge: HOME | End: 2021-12-27
Attending: ORTHOPAEDIC SURGERY | Admitting: ORTHOPAEDIC SURGERY
Payer: MEDICAID

## 2021-12-27 VITALS
BODY MASS INDEX: 33.36 KG/M2 | HEIGHT: 70 IN | TEMPERATURE: 97.7 F | OXYGEN SATURATION: 100 % | DIASTOLIC BLOOD PRESSURE: 68 MMHG | RESPIRATION RATE: 18 BRPM | SYSTOLIC BLOOD PRESSURE: 114 MMHG | HEART RATE: 68 BPM | WEIGHT: 233 LBS

## 2021-12-27 VITALS
DIASTOLIC BLOOD PRESSURE: 54 MMHG | RESPIRATION RATE: 13 BRPM | OXYGEN SATURATION: 99 % | TEMPERATURE: 96 F | SYSTOLIC BLOOD PRESSURE: 101 MMHG

## 2021-12-27 DIAGNOSIS — M22.41 CHONDROMALACIA OF PATELLA, RIGHT: Primary | ICD-10-CM

## 2021-12-27 PROCEDURE — 3700000000 HC ANESTHESIA ATTENDED CARE: Performed by: ORTHOPAEDIC SURGERY

## 2021-12-27 PROCEDURE — 2580000003 HC RX 258: Performed by: ANESTHESIOLOGY

## 2021-12-27 PROCEDURE — 2580000003 HC RX 258: Performed by: NURSE ANESTHETIST, CERTIFIED REGISTERED

## 2021-12-27 PROCEDURE — 2500000003 HC RX 250 WO HCPCS: Performed by: ORTHOPAEDIC SURGERY

## 2021-12-27 PROCEDURE — 2720000010 HC SURG SUPPLY STERILE: Performed by: ORTHOPAEDIC SURGERY

## 2021-12-27 PROCEDURE — 6360000002 HC RX W HCPCS: Performed by: NURSE ANESTHETIST, CERTIFIED REGISTERED

## 2021-12-27 PROCEDURE — 3700000001 HC ADD 15 MINUTES (ANESTHESIA): Performed by: ORTHOPAEDIC SURGERY

## 2021-12-27 PROCEDURE — 7100000010 HC PHASE II RECOVERY - FIRST 15 MIN: Performed by: ORTHOPAEDIC SURGERY

## 2021-12-27 PROCEDURE — 6360000002 HC RX W HCPCS: Performed by: ORTHOPAEDIC SURGERY

## 2021-12-27 PROCEDURE — 7100000011 HC PHASE II RECOVERY - ADDTL 15 MIN: Performed by: ORTHOPAEDIC SURGERY

## 2021-12-27 PROCEDURE — 2500000003 HC RX 250 WO HCPCS: Performed by: ANESTHESIOLOGY

## 2021-12-27 PROCEDURE — 7100000000 HC PACU RECOVERY - FIRST 15 MIN: Performed by: ORTHOPAEDIC SURGERY

## 2021-12-27 PROCEDURE — 7100000001 HC PACU RECOVERY - ADDTL 15 MIN: Performed by: ORTHOPAEDIC SURGERY

## 2021-12-27 PROCEDURE — 2500000003 HC RX 250 WO HCPCS: Performed by: NURSE ANESTHETIST, CERTIFIED REGISTERED

## 2021-12-27 PROCEDURE — 2709999900 HC NON-CHARGEABLE SUPPLY: Performed by: ORTHOPAEDIC SURGERY

## 2021-12-27 PROCEDURE — 3600000014 HC SURGERY LEVEL 4 ADDTL 15MIN: Performed by: ORTHOPAEDIC SURGERY

## 2021-12-27 PROCEDURE — 3600000004 HC SURGERY LEVEL 4 BASE: Performed by: ORTHOPAEDIC SURGERY

## 2021-12-27 RX ORDER — SODIUM CHLORIDE, SODIUM LACTATE, POTASSIUM CHLORIDE, CALCIUM CHLORIDE 600; 310; 30; 20 MG/100ML; MG/100ML; MG/100ML; MG/100ML
INJECTION, SOLUTION INTRAVENOUS CONTINUOUS
Status: DISCONTINUED | OUTPATIENT
Start: 2021-12-27 | End: 2021-12-27 | Stop reason: HOSPADM

## 2021-12-27 RX ORDER — OXYCODONE HYDROCHLORIDE AND ACETAMINOPHEN 5; 325 MG/1; MG/1
2 TABLET ORAL PRN
Status: DISCONTINUED | OUTPATIENT
Start: 2021-12-27 | End: 2021-12-27 | Stop reason: HOSPADM

## 2021-12-27 RX ORDER — ONDANSETRON 2 MG/ML
4 INJECTION INTRAMUSCULAR; INTRAVENOUS PRN
Status: DISCONTINUED | OUTPATIENT
Start: 2021-12-27 | End: 2021-12-27 | Stop reason: HOSPADM

## 2021-12-27 RX ORDER — FENTANYL CITRATE 50 UG/ML
INJECTION, SOLUTION INTRAMUSCULAR; INTRAVENOUS PRN
Status: DISCONTINUED | OUTPATIENT
Start: 2021-12-27 | End: 2021-12-27 | Stop reason: SDUPTHER

## 2021-12-27 RX ORDER — KETOROLAC TROMETHAMINE 30 MG/ML
INJECTION, SOLUTION INTRAMUSCULAR; INTRAVENOUS PRN
Status: DISCONTINUED | OUTPATIENT
Start: 2021-12-27 | End: 2021-12-27 | Stop reason: SDUPTHER

## 2021-12-27 RX ORDER — LABETALOL HYDROCHLORIDE 5 MG/ML
5 INJECTION, SOLUTION INTRAVENOUS EVERY 10 MIN PRN
Status: DISCONTINUED | OUTPATIENT
Start: 2021-12-27 | End: 2021-12-27 | Stop reason: HOSPADM

## 2021-12-27 RX ORDER — BUPIVACAINE HYDROCHLORIDE 2.5 MG/ML
INJECTION, SOLUTION EPIDURAL; INFILTRATION; INTRACAUDAL
Status: DISCONTINUED
Start: 2021-12-27 | End: 2021-12-27 | Stop reason: HOSPADM

## 2021-12-27 RX ORDER — DEXAMETHASONE SODIUM PHOSPHATE 4 MG/ML
INJECTION, SOLUTION INTRA-ARTICULAR; INTRALESIONAL; INTRAMUSCULAR; INTRAVENOUS; SOFT TISSUE PRN
Status: DISCONTINUED | OUTPATIENT
Start: 2021-12-27 | End: 2021-12-27 | Stop reason: SDUPTHER

## 2021-12-27 RX ORDER — MORPHINE SULFATE 2 MG/ML
2 INJECTION, SOLUTION INTRAMUSCULAR; INTRAVENOUS EVERY 5 MIN PRN
Status: DISCONTINUED | OUTPATIENT
Start: 2021-12-27 | End: 2021-12-27 | Stop reason: HOSPADM

## 2021-12-27 RX ORDER — MORPHINE SULFATE 2 MG/ML
1 INJECTION, SOLUTION INTRAMUSCULAR; INTRAVENOUS EVERY 5 MIN PRN
Status: DISCONTINUED | OUTPATIENT
Start: 2021-12-27 | End: 2021-12-27 | Stop reason: HOSPADM

## 2021-12-27 RX ORDER — OXYCODONE HYDROCHLORIDE AND ACETAMINOPHEN 5; 325 MG/1; MG/1
1 TABLET ORAL PRN
Status: DISCONTINUED | OUTPATIENT
Start: 2021-12-27 | End: 2021-12-27 | Stop reason: HOSPADM

## 2021-12-27 RX ORDER — HYDROCODONE BITARTRATE AND ACETAMINOPHEN 5; 325 MG/1; MG/1
1 TABLET ORAL EVERY 6 HOURS PRN
Qty: 28 TABLET | Refills: 0 | Status: SHIPPED | OUTPATIENT
Start: 2021-12-27 | End: 2022-01-03

## 2021-12-27 RX ORDER — DIPHENHYDRAMINE HYDROCHLORIDE 50 MG/ML
12.5 INJECTION INTRAMUSCULAR; INTRAVENOUS
Status: DISCONTINUED | OUTPATIENT
Start: 2021-12-27 | End: 2021-12-27 | Stop reason: HOSPADM

## 2021-12-27 RX ORDER — LIDOCAINE HYDROCHLORIDE 10 MG/ML
INJECTION, SOLUTION INFILTRATION; PERINEURAL PRN
Status: DISCONTINUED | OUTPATIENT
Start: 2021-12-27 | End: 2021-12-27 | Stop reason: SDUPTHER

## 2021-12-27 RX ORDER — PROPOFOL 10 MG/ML
INJECTION, EMULSION INTRAVENOUS PRN
Status: DISCONTINUED | OUTPATIENT
Start: 2021-12-27 | End: 2021-12-27 | Stop reason: SDUPTHER

## 2021-12-27 RX ORDER — HYDRALAZINE HYDROCHLORIDE 20 MG/ML
5 INJECTION INTRAMUSCULAR; INTRAVENOUS EVERY 10 MIN PRN
Status: DISCONTINUED | OUTPATIENT
Start: 2021-12-27 | End: 2021-12-27 | Stop reason: HOSPADM

## 2021-12-27 RX ORDER — SODIUM CHLORIDE 9 MG/ML
25 INJECTION, SOLUTION INTRAVENOUS PRN
Status: DISCONTINUED | OUTPATIENT
Start: 2021-12-27 | End: 2021-12-27 | Stop reason: HOSPADM

## 2021-12-27 RX ORDER — BUPIVACAINE HYDROCHLORIDE 2.5 MG/ML
INJECTION, SOLUTION INFILTRATION; PERINEURAL PRN
Status: DISCONTINUED | OUTPATIENT
Start: 2021-12-27 | End: 2021-12-27 | Stop reason: ALTCHOICE

## 2021-12-27 RX ORDER — PROMETHAZINE HYDROCHLORIDE 25 MG/ML
6.25 INJECTION, SOLUTION INTRAMUSCULAR; INTRAVENOUS
Status: DISCONTINUED | OUTPATIENT
Start: 2021-12-27 | End: 2021-12-27 | Stop reason: HOSPADM

## 2021-12-27 RX ORDER — ONDANSETRON 2 MG/ML
INJECTION INTRAMUSCULAR; INTRAVENOUS PRN
Status: DISCONTINUED | OUTPATIENT
Start: 2021-12-27 | End: 2021-12-27 | Stop reason: SDUPTHER

## 2021-12-27 RX ORDER — SODIUM CHLORIDE 0.9 % (FLUSH) 0.9 %
10 SYRINGE (ML) INJECTION EVERY 12 HOURS SCHEDULED
Status: DISCONTINUED | OUTPATIENT
Start: 2021-12-27 | End: 2021-12-27 | Stop reason: HOSPADM

## 2021-12-27 RX ORDER — LIDOCAINE HYDROCHLORIDE 10 MG/ML
1 INJECTION, SOLUTION EPIDURAL; INFILTRATION; INTRACAUDAL; PERINEURAL
Status: COMPLETED | OUTPATIENT
Start: 2021-12-27 | End: 2021-12-27

## 2021-12-27 RX ORDER — SODIUM CHLORIDE 0.9 % (FLUSH) 0.9 %
10 SYRINGE (ML) INJECTION PRN
Status: DISCONTINUED | OUTPATIENT
Start: 2021-12-27 | End: 2021-12-27 | Stop reason: HOSPADM

## 2021-12-27 RX ORDER — SODIUM CHLORIDE, SODIUM LACTATE, POTASSIUM CHLORIDE, CALCIUM CHLORIDE 600; 310; 30; 20 MG/100ML; MG/100ML; MG/100ML; MG/100ML
INJECTION, SOLUTION INTRAVENOUS CONTINUOUS PRN
Status: DISCONTINUED | OUTPATIENT
Start: 2021-12-27 | End: 2021-12-27 | Stop reason: SDUPTHER

## 2021-12-27 RX ADMIN — FENTANYL CITRATE 50 MCG: 50 INJECTION INTRAMUSCULAR; INTRAVENOUS at 15:23

## 2021-12-27 RX ADMIN — LIDOCAINE HYDROCHLORIDE 60 MG: 10 INJECTION, SOLUTION INFILTRATION; PERINEURAL at 15:19

## 2021-12-27 RX ADMIN — Medication 2000 MG: at 15:16

## 2021-12-27 RX ADMIN — ONDANSETRON HYDROCHLORIDE 4 MG: 2 INJECTION, SOLUTION INTRAMUSCULAR; INTRAVENOUS at 15:27

## 2021-12-27 RX ADMIN — DEXAMETHASONE SODIUM PHOSPHATE 8 MG: 4 INJECTION, SOLUTION INTRAMUSCULAR; INTRAVENOUS at 15:27

## 2021-12-27 RX ADMIN — SODIUM CHLORIDE, POTASSIUM CHLORIDE, SODIUM LACTATE AND CALCIUM CHLORIDE: 600; 310; 30; 20 INJECTION, SOLUTION INTRAVENOUS at 15:16

## 2021-12-27 RX ADMIN — SODIUM CHLORIDE, POTASSIUM CHLORIDE, SODIUM LACTATE AND CALCIUM CHLORIDE: 600; 310; 30; 20 INJECTION, SOLUTION INTRAVENOUS at 13:56

## 2021-12-27 RX ADMIN — LIDOCAINE HYDROCHLORIDE 0.1 ML: 10 INJECTION, SOLUTION EPIDURAL; INFILTRATION; INTRACAUDAL; PERINEURAL at 13:58

## 2021-12-27 RX ADMIN — PROPOFOL 200 MG: 10 INJECTION, EMULSION INTRAVENOUS at 15:19

## 2021-12-27 RX ADMIN — KETOROLAC TROMETHAMINE 30 MG: 30 INJECTION, SOLUTION INTRAMUSCULAR at 15:27

## 2021-12-27 RX ADMIN — FENTANYL CITRATE 50 MCG: 50 INJECTION INTRAMUSCULAR; INTRAVENOUS at 15:19

## 2021-12-27 ASSESSMENT — PULMONARY FUNCTION TESTS
PIF_VALUE: 2
PIF_VALUE: 2
PIF_VALUE: 1
PIF_VALUE: 3
PIF_VALUE: 4
PIF_VALUE: 2
PIF_VALUE: 2
PIF_VALUE: 3
PIF_VALUE: 2
PIF_VALUE: 0
PIF_VALUE: 2
PIF_VALUE: 0
PIF_VALUE: 2
PIF_VALUE: 2
PIF_VALUE: 3
PIF_VALUE: 2
PIF_VALUE: 3
PIF_VALUE: 2
PIF_VALUE: 3
PIF_VALUE: 0
PIF_VALUE: 3

## 2021-12-27 ASSESSMENT — PAIN DESCRIPTION - DESCRIPTORS: DESCRIPTORS: SHARP;SHOOTING

## 2021-12-27 ASSESSMENT — PAIN - FUNCTIONAL ASSESSMENT
PAIN_FUNCTIONAL_ASSESSMENT: 0-10
PAIN_FUNCTIONAL_ASSESSMENT: PREVENTS OR INTERFERES SOME ACTIVE ACTIVITIES AND ADLS

## 2021-12-27 ASSESSMENT — PAIN SCALES - GENERAL: PAINLEVEL_OUTOF10: 0

## 2021-12-27 NOTE — ANESTHESIA PRE PROCEDURE
Department of Anesthesiology  Preprocedure Note       Name:  Shala Sanchez   Age:  39 y.o.  :  1985                                          MRN:  [id number]         Date:  2021      Surgeon: Yana Daniels):  Chao Carrillo MD    Procedure: Procedure(s):  RIGHT KNEE VIDEO ARTHROSCOPY CHONDROPLASTY OF PATELLA    Medications prior to admission:   Prior to Admission medications    Not on File       Current medications:    Current Facility-Administered Medications   Medication Dose Route Frequency Provider Last Rate Last Admin    lactated ringers infusion   IntraVENous Continuous Luis Miguel Scales  mL/hr at 21 1356 New Bag at 21 1356    sodium chloride flush 0.9 % injection 10 mL  10 mL IntraVENous 2 times per day Luis Miguel Scales MD        sodium chloride flush 0.9 % injection 10 mL  10 mL IntraVENous PRN Luis Miguel Scales MD        0.9 % sodium chloride infusion  25 mL IntraVENous PRN Luis Miguel Scales MD        ceFAZolin (ANCEF) 2000 mg in sterile water 20 mL IV syringe  2,000 mg IntraVENous Once Chao Carrillo MD           Allergies:  No Known Allergies    Problem List:    Patient Active Problem List   Diagnosis Code    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, M22.42    Synovitis of both knee joints M65.9    Open fracture of tuft of distal phalanx of left thumb S62.522B    Acute lateral meniscus tear of left knee S83.282A    Dislocation of left knee with medial meniscus tear S83.105A, N24.827J    Current smoker F17.200    Chondromalacia of patella, right M22.41       Past Medical History:        Diagnosis Date    Anxiety     GERD (gastroesophageal reflux disease)     Hx of blood clots     post left knee surgery       Past Surgical History:        Procedure Laterality Date    CARDIAC CATHETERIZATION      KNEE ARTHROSCOPY Left 2021    LEFT KNEE VIDEO ARTHROSCOPY, CHONDROPLASTY OF PATELLA, SYNOVECTOMY performed by Chao Carrillo MD at [address] [place] [address] LT    OTHER SURGICAL HISTORY  08/16/2021    LEFT KNEE VIDEO ARTHROSCOPY, CHONDROPLASTY OF PATELLA, SYNOVECTOMY     OTHER SURGICAL HISTORY      LEFT KNEE VIDEO ARTHROSCOPY, CHONDROPLASTY OF PATELLA, SYNOVECTOMY    SHOULDER SURGERY         Social History:    Social History     Tobacco Use    Smoking status: Current Every Day Smoker     Packs/day: 1.50     Types: Cigarettes    Smokeless tobacco: Never Used   Substance Use Topics    Alcohol use: Yes     Comment: rarely                                Ready to quit: Not Answered  Counseling given: Not Answered      Vital Signs (Current):   Vitals:    12/27/21 1352   BP: 130/87   Pulse: 69   Resp: 20   Temp: 98.6 °F (37 °C)   TempSrc: Temporal   SpO2: 98%   Weight: 233 lb (105.7 kg)   Height: 5' 10\" (1.778 m)                                              BP Readings from Last 3 Encounters:   12/27/21 130/87   08/20/21 130/83   08/16/21 110/60       NPO Status: Time of last liquid consumption: 2300                        Time of last solid consumption: 2300                        Date of last liquid consumption: 12/26/21                        Date of last solid food consumption: 12/26/21    BMI:   Wt Readings from Last 3 Encounters:   12/27/21 233 lb (105.7 kg)   12/02/21 233 lb (105.7 kg)   11/23/21 233 lb (105.7 kg)     Body mass index is 33.43 kg/m².     CBC:   Lab Results   Component Value Date    WBC 11.6 08/20/2021    RBC 4.99 08/20/2021    HGB 15.2 08/20/2021    HCT 43.7 08/20/2021    MCV 87.5 08/20/2021    RDW 13.2 08/20/2021     08/20/2021       CMP:   Lab Results   Component Value Date     08/20/2021    K 4.2 08/20/2021     08/20/2021    CO2 26 08/20/2021    BUN 10 08/20/2021    CREATININE 0.9 08/20/2021    GFRAA >60 08/20/2021    AGRATIO 1.5 08/20/2021    LABGLOM >60 08/20/2021    GLUCOSE 106 08/20/2021    PROT 6.6 08/20/2021    CALCIUM 9.5 08/20/2021    BILITOT <0.2 08/20/2021    ALKPHOS 84 08/20/2021    AST 20 08/20/2021    ALT 20 08/20/2021       POC Tests: No results for input(s): POCGLU, POCNA, POCK, POCCL, POCBUN, POCHEMO, POCHCT in the last 72 hours. Coags: No results found for: PROTIME, INR, APTT    HCG (If Applicable): No results found for: PREGTESTUR, PREGSERUM, HCG, HCGQUANT     ABGs: No results found for: PHART, PO2ART, VZX7VVO, IDI5ODE, BEART, L2IXNXLA     Type & Screen (If Applicable):  No results found for: LABABO, LABRH    Drug/Infectious Status (If Applicable):  No results found for: HIV, HEPCAB    COVID-19 Screening (If Applicable):   Lab Results   Component Value Date    COVID19 Not Detected 12/22/2021    COVID19 Not Detected 08/10/2021           Anesthesia Evaluation  Patient summary reviewed no history of anesthetic complications:   Airway: Mallampati: I  TM distance: >3 FB   Neck ROM: full  Mouth opening: > = 3 FB Dental: normal exam         Pulmonary:Negative Pulmonary ROS and normal exam  breath sounds clear to auscultation                             Cardiovascular:Negative CV ROS  Exercise tolerance: good (>4 METS),           Rhythm: regular  Rate: normal                    Neuro/Psych:   Negative Neuro/Psych ROS              GI/Hepatic/Renal: Neg GI/Hepatic/Renal ROS  (+) GERD: well controlled,           Endo/Other: Negative Endo/Other ROS                    Abdominal:             Vascular: negative vascular ROS.  + DVT, . Other Findings:        Pre-Operative Diagnosis: RIGHT KNEE CHONDROMALACIA PATELLA    39 y.o.   BMI:  Body mass index is 33.43 kg/m².      Vitals:    12/27/21 1352   BP: 130/87   Pulse: 69   Resp: 20   Temp: 98.6 °F (37 °C)   TempSrc: Temporal   SpO2: 98%   Weight: 233 lb (105.7 kg)   Height: 5' 10\" (1.778 m)       No Known Allergies    Social History     Tobacco Use    Smoking status: Current Every Day Smoker     Packs/day: 1.50     Types: Cigarettes    Smokeless tobacco: Never Used   Substance Use Topics    Alcohol use: Yes     Comment: rarely       LABS:    CBC  Lab Results   Component Value Date/Time    WBC 11.6 (H) 08/20/2021 04:45 PM    HGB 15.2 08/20/2021 04:45 PM    HCT 43.7 08/20/2021 04:45 PM     08/20/2021 04:45 PM     RENAL  Lab Results   Component Value Date/Time     08/20/2021 04:45 PM    K 4.2 08/20/2021 04:45 PM     08/20/2021 04:45 PM    CO2 26 08/20/2021 04:45 PM    BUN 10 08/20/2021 04:45 PM    CREATININE 0.9 08/20/2021 04:45 PM    GLUCOSE 106 (H) 08/20/2021 04:45 PM     COAGS  No results found for: PROTIME, INR, APTT          Anesthesia Plan      general     ASA 3     (I discussed with the patient the risks and benefits of PIV, anesthesia, IV Narcotics, PACU. All questions were answered the patient agrees with the plan and wishes to proceed)  Induction: intravenous.                           Jaycee Quintana MD   12/27/2021

## 2021-12-27 NOTE — H&P
Update History & Physical    The patient's History and Physical  was reviewed with the patient and I examined the patient. There was no change. The surgical site was confirmed by the patient and me. Plan: The risks, benefits, expected outcome, and alternative to the recommended procedure have been discussed with the patient. Patient understands and wants to proceed with the procedure.      Electronically signed by Kenn Waite MD on 12/27/2021 at 1:54 PM

## 2021-12-27 NOTE — BRIEF OP NOTE
Brief Postoperative Note      Patient: Tasha Peñaloza  YOB: 1985  MRN: [id number]    Date of Procedure: 12/27/2021    Pre-Op Diagnosis: RIGHT KNEE CHONDROMALACIA PATELLA    Post-Op Diagnosis: Same       Procedure(s):  RIGHT KNEE VIDEO ARTHROSCOPY CHONDROPLASTY OF PATELLA    Surgeon(s):  Conrad Mclain MD    Assistant:  First Assistant: Billie Mayorga    Anesthesia: General    Estimated Blood Loss (mL): Minimal    Complications: None    Specimens:   * No specimens in log *    Implants:  * No implants in log *      Drains: * No LDAs found *    Findings: DAXA    Electronically signed by Conrad Mclain MD on 12/27/2021 at 3:45 PM

## 2021-12-27 NOTE — ANESTHESIA POSTPROCEDURE EVALUATION
Department of Anesthesiology  Postprocedure Note    Patient: Yvonne Marte  MRN: [id number]  YOB: 1985  Date of evaluation: 12/27/2021  Time:  4:14 PM     Procedure Summary     Date: 12/27/21 Room / Location: [facility] OR 01 / [facility]    Anesthesia Start: 4724 Anesthesia Stop: 1544    Procedure: RIGHT KNEE VIDEO ARTHROSCOPY CHONDROPLASTY OF PATELLA (Right Knee) Diagnosis: (RIGHT KNEE CHONDROMALACIA PATELLA)    Surgeons: Latia Carlton MD Responsible Provider: Gerardo Bonner MD    Anesthesia Type: general ASA Status: 3          Anesthesia Type: general    Lang Phase I: Lang Score: 10    Lang Phase II: Lang Score: 10    Last vitals: Reviewed and per EMR flowsheets.        Anesthesia Post Evaluation    Comments: Postoperative Anesthesia Note    Name:    Yvonne Marte  MRN:      [id number]    Patient Vitals in the past 12 hrs:  12/27/21 1601, BP:125/73, Temp:97.6 °F (36.4 °C), Temp src:Infrared, Pulse:64, Resp:18, SpO2:98 %  12/27/21 1557, BP:131/70, Temp:97.4 °F (36.3 °C), Temp src:Infrared, Pulse:67, Resp:18, SpO2:97 %  12/27/21 1553, BP:123/76, Temp:97.4 °F (36.3 °C), Temp src:Infrared, Pulse:77, Resp:16, SpO2:98 %  12/27/21 1548, BP:(!) 121/59, Temp:97.6 °F (36.4 °C), Temp src:Infrared, Pulse:73, Resp:16, SpO2:97 %  12/27/21 1543, BP:(!) 143/78, Temp:97.4 °F (36.3 °C), Temp src:Infrared, Pulse:87, Resp:16, SpO2:96 %  12/27/21 1352, BP:130/87, Temp:98.6 °F (37 °C), Temp src:Temporal, Pulse:69, Resp:20, SpO2:98 %, Height:5' 10\" (1.778 m), Weight:233 lb (105.7 kg)     LABS:    CBC  Lab Results       Component                Value               Date/Time                  WBC                      11.6 (H)            08/20/2021 04:45 PM        HGB                      15.2                08/20/2021 04:45 PM        HCT                      43.7                08/20/2021 04:45 PM        PLT                      247                 08/20/2021 04:45 PM   RENAL  Lab Results Component                Value               Date/Time                  NA                       139                 08/20/2021 04:45 PM        K                        4.2                 08/20/2021 04:45 PM        CL                       102                 08/20/2021 04:45 PM        CO2                      26                  08/20/2021 04:45 PM        BUN                      10                  08/20/2021 04:45 PM        CREATININE               0.9                 08/20/2021 04:45 PM        GLUCOSE                  106 (H)             08/20/2021 04:45 PM   COAGS  No results found for: PROTIME, INR, APTT    Intake & Output:  @08FOVZ@    Nausea & Vomiting:  No    Level of Consciousness:  Awake    Pain Assessment:  Adequate analgesia    Anesthesia Complications:  No apparent anesthetic complications    SUMMARY      Vital signs stable  OK to discharge from Stage I post anesthesia care.   Care transferred from Anesthesiology department on discharge from perioperative area

## 2021-12-27 NOTE — PROGRESS NOTES
Patient tolerating oral fluids, patient dressing at this time. Discharge instructions given to wife Nena Garcia verbalized understanding.
Phase I (PACU)  1. Patient is identified using name and the date of birth. 2.  The patient is free from signs and symptoms of chemical, electrical, laser, radiation, positioning, or transfer/transport injury. 3.  The patient receives appropriate medication(s), safely administered during the Perioperative period. 4.  The patient has wound/tissue perfusion consistent with or improved from baseline levels established preoperatively. 5.  The patient is at or returning to normothermia at the conclusion of the immediate postoperative period. 6.  The patient's fluid, electrolyte, and acid base balances are consistent with or improved from baseline levels established preoperatively. 7.  The patient's pulmonary function is consistent with or improved from baseline levels established preoperatively. 8.  The patient's cardiovascular status is consistent with or improved from baseline levels established preoperatively. 9.  The patient/caregiver participates in decisions affecting his or her Perioperative plan of care. 10. The patient's care is consistent with the individualized Perioperative plan of care. 11. The patient's right to privacy is maintained. 12. The patient is the recipient of competent and ethical care within legal standards of practice. 13.  The patient's value system, lifestyle, ethnicity, and culture are considered, respected, and incorporated in the Perioperative plan of care. 14.  The patient demonstrates and/or reports adequate pain control throughout the the Perioperative period. 15. The patient's neurological status is consistent with or improved from baseline levels established preoperatively. 16.  The patient/caregiver demonstrates knowledge of the expected responses to the operative or invasive procedure. 17.  Patient/Caregiver has reduced anxiety. Interventions- Familiarize with environment and equipment.   18.  Other:  19.Other:
Pt to recliner, demonstrates independent movement.
baseline levels established preoperatively. 23.  The patient/caregiver demonstrates knowledge of the expected responses to the operative or invasive procedure. 20.  Patient/Caregiver has reduced anxiety. Interventions- Familiarize with environment and equipment.   21. Other:  22. Other:

## 2021-12-28 NOTE — OP NOTE
[address]                 [address] [address]                                OPERATIVE REPORT    PATIENT NAME: Britton Leone                  :        1985  MED REC NO:   [id number]                          ROOM:  ACCOUNT NO:   [de-identified]                           ADMIT DATE: 2021  PROVIDER:     Naun Cross MD    DATE OF PROCEDURE:  2021    PREOPERATIVE DIAGNOSIS:  Right knee chondromalacia of the patella. POSTOPERATIVE DIAGNOSIS:  Right knee chondromalacia of the patella. OPERATION PERFORMED:  Right knee video arthroscopy with chondroplasty of  the patella. SURGEON:  Naun Cross MD    ANESTHESIA:  General.    BLOOD LOSS:  Less than 5 mL. COMPLICATIONS:  None noted. INDICATIONS FOR OPERATION:  This is a 65-year-old male with a history of  right knee pain consistent with chondromalacia of the patella with  patellofemoral grinding _____ locking and swelling. After failure of  all other modalities, he elected for further recommendation of surgical  intervention. DESCRIPTION OF OPERATION:  The patient was taken to the operating room  where general anesthetic was obtained. Antibiotics were given in IV  piggyback preoperatively. The right knee and leg were sterilely prepped  and draped and the two-port arthroscopy of the right knee was carried  out in the usual fashion using a 30-degree arthroscope. Upon entry into  the knee, he was noted to have grade 3 chondromalacia of the patella and  inferior pole of the patella and he had some medial plica with  excoriating medial femoral condyle intervening the synovitis and  patellofemoral joint, which also _____ symptoms. The patellofemoral  joint was debulked with synovitis and the plug was excised. Medial  meniscus, lateral meniscus, ACL, and PCL were intact and pristine, as  well as the joints.   The knee was then thoroughly irrigated and  evacuated of all loose debris and instilled 20 mL of 0.25% Marcaine  plain. The port was repaired with 4-0 nylon in a figure-of-eight  fashion. The knee was dressed in Steri-Strips, dressings, sponge, ABDs,  Webril, and Ace wrap. He appeared to tolerate the procedure well. He  will be taken to recovery room in stable. Less than 5 mL of blood loss  and no noted complications.         Aimee Shearer MD    D: 12/27/2021 15:49:15       T: 12/28/2021 1:49:56     [id number]  Job#: [id number]     Doc#: [id number]    CC:

## 2022-01-03 ENCOUNTER — OFFICE VISIT (OUTPATIENT)
Dept: ORTHOPEDIC SURGERY | Age: 37
End: 2022-01-03

## 2022-01-03 DIAGNOSIS — M22.41 CHONDROMALACIA OF BOTH PATELLAE: Primary | ICD-10-CM

## 2022-01-03 DIAGNOSIS — M22.42 CHONDROMALACIA OF BOTH PATELLAE: Primary | ICD-10-CM

## 2022-01-03 DIAGNOSIS — S83.282D ACUTE LATERAL MENISCUS TEAR OF LEFT KNEE, SUBSEQUENT ENCOUNTER: ICD-10-CM

## 2022-01-03 PROCEDURE — 99024 POSTOP FOLLOW-UP VISIT: CPT | Performed by: ORTHOPAEDIC SURGERY

## 2022-01-03 RX ORDER — HYDROCODONE BITARTRATE AND ACETAMINOPHEN 5; 325 MG/1; MG/1
1 TABLET ORAL EVERY 6 HOURS PRN
Qty: 28 TABLET | Refills: 0 | Status: SHIPPED | OUTPATIENT
Start: 2022-01-03 | End: 2022-01-10

## 2022-01-03 NOTE — PROGRESS NOTES
FOLLOW-UP VISIT      The patient returns for follow-up s/p right knee video arthroscopy with chondroplasty patella    Date of Surgery    12/27/2021    Wound Status    Sutures Removed, Incisions are healing well with no surrounding erythema, and minimal ecchymosis. Exam    He is doing well no signs of infection DVT. He is walk without amatory aids but still has pain in both knees he grades 6-7 over 10. Is dull and achy. At this time I recommend structured formal physical therapy for the next 6 weeks and return here in 4 to 5 weeks.     Plan    As above    Follow-up Appointment    4 to 5 weeks

## 2022-01-04 ENCOUNTER — HOSPITAL ENCOUNTER (OUTPATIENT)
Dept: PHYSICAL THERAPY | Age: 37
Setting detail: THERAPIES SERIES
Discharge: HOME | End: 2022-01-04
Payer: MEDICAID

## 2022-01-04 PROCEDURE — 97112 NEUROMUSCULAR REEDUCATION: CPT

## 2022-01-04 PROCEDURE — 97110 THERAPEUTIC EXERCISES: CPT

## 2022-01-04 PROCEDURE — 97161 PT EVAL LOW COMPLEX 20 MIN: CPT

## 2022-01-04 NOTE — PROGRESS NOTES
[address] [place] [address] and Sports Rehabilitation, [address], [address] [place] [address] [address]  Phone: [phone]   Fax: [phone]    Date: 2022          Patient Name; :  Jennifer Augustin; 1985   Dx: Diagnosis: M22.41, M22.42 (ICD-10-CM) - Chondromalacia of both patellae; S83.282D (ICD-10-CM) - Acute lateral meniscus tear of left knee, subsequent encounter      Physician: Referring Practitioner: Jan Cleveland MD        Total PT Visits:     Measures Previous Current   Pain (0-10)     Disability %           Assessment:        Prognosis for POC: [] Good [] Fair  [] Poor      Patient requires continued skilled intervention: [] Yes  [] No        Plan & Recommendations:  [] Continue rehabilitation due to objective improvement and continued functional deficits with frequency and duration:   [] Progress toward  []GAP, []Work Conditioning, []Independent HEP   [] Discharge due to   [] All goals achieved, [] Maximized \"medical necessity\" [] No subjective or objective improvements      Electronically signed by:  Abigail Talley, PT  Therapy Plan of Care Re-Certification  This patient has been re-evaluated for physical therapy services and for therapy to continue, Medicare, Medicaid and other insurances require periodic physician review of the treatment plan. Please review the above re-evaluation and verify that you agree with plan of care as established above by signing the attached document and return it to our office or note changes to established plan below  [] Follow treatment plan as above [] Discontinue physical therapy  [] Change plan to:                                 __________________________________________________    Physician Signature:____________________________________ Date:____________  By signing above, therapists plan is approved by physician    If you have any questions or concerns, please don't hesitate to call.   Thank you for your referral.

## 2022-01-04 NOTE — PLAN OF CARE
[address] [facility] and [facility], [address] [facility], [address] [facility] [address]  Phone: [phone]   Fax:     [phone]       Physical Therapy Certification    Dear Referring Practitioner: Enrique Kerr MD,    We had the pleasure of evaluating the following patient for physical therapy services at [address] [place] [address]. A summary of our findings can be found in the initial assessment below. This includes our plan of care. If you have any questions or concerns regarding these findings, please do not hesitate to contact me at the office phone number checked above. Thank you for the referral.       Physician Signature:_______________________________Date:__________________  By signing above (or electronic signature), therapists plan is approved by physician      Patient: Jeniffer Fink   : 1985   MRN: [id number]  Referring Physician: Referring Practitioner: Enrique Kerr MD      Evaluation Date: 2022      Medical Diagnosis Information:  Diagnosis: M22.41, M22.42 (ICD-10-CM) - Chondromalacia of both patellae; J41.172O (ICD-10-CM) - Acute lateral meniscus tear of left knee, subsequent encounter   Treatment Diagnosis: Bilateral knee pain, decreased ROM, flexibility, functional strength                                         Insurance information: PT Insurance Information: [facility]     Precautions/ Contra-indications: None    Latex Allergy:  [x]NO      []YES     Preferred Language for Healthcare:   [x]English       []other:    SUBJECTIVE: Patient states had knee surgery in august on left and right one done last Monday. More pain in left since he developed a blood clot shortly after. Relevant Medical History: History of left calf bood clot    Pain Scale: Current: 4/10; Max 8/10; Best 0/10    Easing factors: Being in recliner.      Provocative factors: longer walking and standing. Type: []Constant   [x]Intermittent  []Radiating []Localized []other:     Numbness/Tingling: None    Occupation/School: [employer]. Living Status/Prior Level of Function: Independent with ADLs and IADLs. C-SSRS Triggered by Intake questionnaire (Past 2 wk assessment):   [x] No, Questionnaire did not trigger screening.   [] Yes, Patient intake triggered further evaluation      [] C-SSRS Screening completed  [] PCP notified via Plan of Care  [] Emergency services notified     OBJECTIVE:     ROM RIGHT LEFT   Hip Flexion     Hip Abduction     Hip Extension     Hip Internal Rotation     Hip External Rotation          Knee Extension 0 0   Knee Flexion 105 125        Ankle Dorsiflexion     Ankle Plantarflexion     Ankle Inversion     Ankle Eversion          Popliteal angle 140 140   Rectus femoris 90 110        Strength  RIGHT LEFT   Hip Flexors     Hip Abductors     Hip Extension     Hip External Rotation     Hip Internal Rotation          Knee Extension 4+ 4+   Knee Flexion 4+ 4+        Ankle Dorsiflexion 5 5   Ankle Plantarflexion 5 5   Ankle Inversion     Ankle Eversion            Reflexes/Sensation:    [x]Dermatomes/Myotomes intact    []Reflexes equal and normal bilaterally- NT   []Other:    Joint mobility:    []Normal    [x]Hypo   []Hyper    Palpation: ttp medial joint lines    Functional Mobility/Transfers: Independent    Posture: WFL    Bandages/Dressings/Incisions: Incision well approximated, no s/s of DVT or infection    Gait: (include devices/WB status) Decreased velocity    Orthopedic Special Tests: None                       [x] Patient history, allergies, meds reviewed. Medical chart reviewed. See intake form. Review Of Systems (ROS):  [x]Performed Review of systems (Integumentary, CardioPulmonary, Neurological) by intake and observation. Intake form has been scanned into medical record.  Patient has been instructed to contact their primary care physician regarding ROS issues if not already being addressed at this time. Co-morbidities/Complexities (which will affect course of rehabilitation):   []None           Arthritic conditions   []Rheumatoid arthritis (M05.9)  []Osteoarthritis (M19.91)   Cardiovascular conditions   []Hypertension (I10)  []Hyperlipidemia (E78.5)  []Angina pectoris (I20)  []Atherosclerosis (I70)   Musculoskeletal conditions   []Disc pathology   []Congenital spine pathologies   []Prior surgical intervention  []Osteoporosis (M81.8)  []Osteopenia (M85.8)   Endocrine conditions   []Hypothyroid (E03.9)  []Hyperthyroid Gastrointestinal conditions   []Constipation (F59.71)   Metabolic conditions   []Morbid obesity (E66.01)  []Diabetes type 1(E10.65) or 2 (E11.65)   []Neuropathy (G60.9)     Pulmonary conditions   []Asthma (J45)  []Coughing   []COPD (J44.9)   Psychological Disorders  [x]Anxiety (F41.9)  []Depression (F32.9)   []Other:   []Other:          Barriers to/and or personal factors that will affect rehab potential:              []Age  []Sex              []Motivation/Lack of Motivation                        []Co-Morbidities              []Cognitive Function, education/learning barriers              []Environmental, home barriers              []profession/work barriers  []past PT/medical experience  []other:  Justification: None    Falls Risk Assessment (30 days):   [x] Falls Risk assessed and no intervention required. [] Falls Risk assessed and Patient requires intervention due to being higher risk   TUG score (>12s at risk):     [] Falls education provided, including           Functional Questionnaire: LEFS 60%        ASSESSMENT: Janiya Boyd is a 39 y.o. male reporting to OP PT with c/c of bilateral knee pain and knee scopes bilaterally, left on 8/16/21 and right on 12/27/21. Pt is noted to have reduced ROM, flexibility and strength. Overall reduced functional mobility. Will progress as tolerated.          Functional Impairments:     []Noted lumbar/proximal hip/LE joint hypomobility   [x]Decreased LE functional ROM   [x]Decreased core/proximal hip strength and neuromuscular control   [x]Decreased LE functional strength   [x]Reduced balance/proprioceptive control   []other:      Functional Activity Limitations (from functional questionnaire and intake)   []Reduced ability to tolerate prolonged functional positions   []Reduced ability or difficulty with changes of positions or transfers between positions   []Reduced ability to maintain good posture and demonstrate good body mechanics with sitting, bending, and lifting   []Reduced ability to sleep   [] Reduced ability or tolerance with driving and/or computer work   [x]Reduced ability to perform lifting, carrying tasks   [x]Reduced ability to squat   []Reduced ability to forward bend   [x]Reduced ability to ambulate prolonged functional periods/distances/surfaces   [x]Reduced ability to ascend/descend stairs   [x]Reduced ability to run, hop, cut or jump   []other:    Participation Restrictions   []Reduced participation in self care activities   [x]Reduced participation in home management activities   [x]Reduced participation in work activities   [x]Reduced participation in social activities. []Reduced participation in sport/recreation activities. Classification :    [x]Signs/symptoms consistent with post-surgical status including decreased ROM, strength and function.    []Signs/symptoms consistent with joint sprain/strain   []Signs/symptoms consistent with patella-femoral syndrome   []Signs/symptoms consistent with knee OA/hip OA   []Signs/symptoms consistent with internal derangement of knee/Hip   []Signs/symptoms consistent with functional hip weakness/NMR control      []Signs/symptoms consistent with tendinitis/tendinosis    []signs/symptoms consistent with pathology which may benefit from Dry needling      []other:      Prognosis/Rehab Potential:      []Excellent   [x]Good    []Fair   []Poor    Tolerance of evaluation/treatment:    []Excellent   [x]Good    []Fair   []Poor    Physical Therapy Evaluation Complexity Justification  [x] A history of present problem with:  [x] no personal factors and/or comorbidities that impact the plan of care;  []1-2 personal factors and/or comorbidities that impact the plan of care  []3 personal factors and/or comorbidities that impact the plan of care  [x] An examination of body systems using standardized tests and measures addressing any of the following: body structures and functions (impairments), activity limitations, and/or participation restrictions;:  [x] a total of 1-2 or more elements   [] a total of 3 or more elements   [] a total of 4 or more elements   [x] A clinical presentation with:  [x] stable and/or uncomplicated characteristics   [] evolving clinical presentation with changing characteristics  [] unstable and unpredictable characteristics;   [x] Clinical decision making of [] low, [] moderate, [] high complexity using standardized patient assessment instrument and/or measurable assessment of functional outcome. [x] EVAL (LOW) 31341 (typically 20 minutes face-to-face)  [] EVAL (MOD) 78696 (typically 30 minutes face-to-face)  [] EVAL (HIGH) 56862 (typically 45 minutes face-to-face)  [] RE-EVAL     PLAN:   Frequency/Duration:  2 days per week for 8 Weeks:  Interventions:  [x]  Therapeutic exercise including: strength training, ROM, for Lower extremity and core   [x]  NMR activation and proprioception for LE, Glutes and Core   [x]  Manual therapy as indicated for LE, Hip and spine to include: Dry Needling/IASTM, STM, PROM, Gr I-IV mobilizations, manipulation. [x] Modalities as needed that may include: thermal agents, E-stim, Biofeedback, US, iontophoresis as indicated  [x] Patient education on joint protection, postural re-education, activity modification, progression of HEP.     HEP instruction: [id number](see scanned forms)    GOALS:  Patient stated goal: Improve mobility, return to work     Therapist goals for Patient:   Short Term Goals: To be achieved in: 2 weeks  1. Independent in HEP and progression per patient tolerance, in order to prevent re-injury. [] Progressing: [] Met: [] Not Met: [] Adjusted     2. Patient will have a decrease in pain of NRPS to </=3/10 facilitate improvement in movement, function, and ADLs as indicated by Functional Deficits. [] Progressing: [] Met: [] Not Met: [] Adjusted     Long Term Goals: To be achieved in: 8 weeks  1. Disability index score of 30% or less for the LEFS to assist with reaching prior level of function. [] Progressing: [] Met: [] Not Met: [] Adjusted     2. Patient will demonstrate increased AROM to 0-130 to bilateral knees to allow for proper joint functioning as indicated by patients Functional Deficits. [] Progressing: [] Met: [] Not Met: [] Adjusted     3. Patient will demonstrate an increase in Strength to 5/5 to knee flex/ext allow for proper functional mobility as indicated by patients Functional Deficits. [] Progressing: [] Met: [] Not Met: [] Adjusted     4. Patient will return to functional walking activities with NRPS of </= 1/10. [] Progressing: [] Met: [] Not Met: [] Adjusted     5.  Pt will be able to ascend/descend ladder with deviation to allow for return to work activities.  (patient specific functional goal)    [] Progressing: [] Met: [] Not Met: [] Adjusted      Electronically signed by:  Giuliana Pastor PT

## 2022-01-04 NOTE — FLOWSHEET NOTE
[address] [facility], [address] [place] [address] [address]  Phone: [phone]   Fax:     [phone]      Physical Therapy Treatment Note/ Progress Report:           Date:  2022    Patient Name:  Jose Carlos Weaver    :  1985  MRN: [id number]  Restrictions/Precautions:    Medical/Treatment Diagnosis Information:  · Diagnosis: M22.41, M22.42 (ICD-10-CM) - Chondromalacia of both patellae; S83.282D (ICD-10-CM) - Acute lateral meniscus tear of left knee, subsequent encounter  · Treatment Diagnosis: Bilateral knee pain, decreased ROM, flexibility, strength  Insurance/Certification information:  PT Insurance Information: [employer] James  Physician Information:    Has the plan of care been signed (Y/N):        []  Yes  []  No     Date of Patient follow up with Physician:     Assessment Summary: Lory Neff is a 39 y.o. male reporting to OP PT with c/c of bilateral knee pain and knee scopes bilaterally, left on 21 and right on 21. Pt is noted to have reduced ROM, flexibility and strength. Overall reduced functional mobility. Will progress as tolerated.        Is this a Progress Report:     []  Yes  [x]  No        If Yes:  Date Range for reporting period:  Beginning   22  Ending 22    Progress report will be due (10 Rx or 30 days whichever is less): 28       Recertification will be due (POC Duration  / 90 days whichever is less): 3/4/22          Visit # Insurance Allowable Auth Required   In Person  30 []  Yes     []  No    Tele Health   []  Yes     []  No    Total 1             Functional Scale: LEFS 60%    Date assessed:        Latex Allergy:  [x]NO      []YES  Preferred Language for Healthcare:   [x]English       []other:      Pain level:  4/10     SUBJECTIVE:  see eval    OBJECTIVE: see eval      RESTRICTIONS/PRECAUTIONS: None    Exercises/Interventions: HEP code: [id number]  Therapeutic Ex (91293) HEP 22     Warm-up       Rec bike              TABLE       Heel slides x x10 B     Strap gastroc stretch x 1' B     Hs stretch x 1' B     Quad set  x10 B     SLR x 10x2 B     Bridge x x15            SEATED                                                 STANDING                                                                                                                  Manual (50765): Prone quad stretch 1' B    Therapeutic Exercise and NMR EXR  [x] (24275) Provided verbal/tactile cueing for activities related to strengthening, flexibility, endurance, ROM for improvements in LE, proximal hip, and core control with self care, mobility, lifting, ambulation. [x] (78118) Provided verbal/tactile cueing for activities related to improving balance, coordination, kinesthetic sense, posture, motor skill, proprioception to assist with LE, proximal hip, and core control in self-care, mobility, lifting, ambulation and eccentric single leg control.      NMR and Therapeutic Activities:    [x] (33204 or 97417) Provided verbal/tactile cueing for activities related to improving balance, coordination, kinesthetic sense, posture, motor skill, proprioception and motor activation to allow for proper function of core, proximal hip and LE with self-care and ADLs and functional mobility.   [] (03785) Gait Re-education- Provided training and instruction to the patient for proper LE, core and proximal hip recruitment and positioning and eccentric body weight control with ambulation re-education including up and down stairs     Home Exercise Program:    [x] (38460) Reviewed/Progressed HEP activities related to strengthening, flexibility, endurance, ROM of core, proximal hip and LE for functional self-care, mobility, lifting and ambulation/stair navigation   [] (21614) Reviewed/Progressed HEP activities related to improving balance, coordination, kinesthetic sense, posture, motor skill, proprioception of core, proximal hip and LE for self-care, mobility, lifting, and ambulation/stair navigation      Manual Treatments:  PROM / STM / Oscillations-Mobs:  G-I, II, III, IV (PA's, Inf., Post.)  [x] (39194) Provided manual therapy to mobilize LE, proximal hip and/or LS spine soft tissue/joints for the purpose of modulating pain, promoting relaxation, increasing ROM, reducing/eliminating soft tissue swelling/inflammation/restriction, improving soft tissue extensibility and allowing for proper ROM for normal function with self-care, mobility, lifting and ambulation. Modalities:     [] GAME READY (VASO)- for significant edema, swelling, pain control. Charges:  Timed Code Treatment Minutes: 23   Total Treatment Minutes:  40   BWC:  TE TIME:  NMR TIME:  MANUAL TIME:   TA  UNTIMED MINUTES:  Medicare Total:   15  8      15        [x] EVAL (LOW) 98141 (typically 20 minutes face-to-face)  [] EVAL (MOD) 35021 (typically 30 minutes face-to-face)  [] EVAL (HIGH) 54058 (typically 45 minutes face-to-face)  [] RE-EVAL     [x] BU(54248) 1     [] IONTO  [x] NMR (83901) 1     [] VASO  [] Manual (18356) x     [] Dry Needle:  [] TA x      [] Mech Traction (13204)  [] ES(attended) (95258)      [] ES (un) (02295):        GOALS:     Patient stated goal: Improve mobility, return to work      Therapist goals for Patient:   Short Term Goals: To be achieved in: 2 weeks  1. Independent in HEP and progression per patient tolerance, in order to prevent re-injury. []? Progressing: []? Met: []? Not Met: []? Adjusted      2. Patient will have a decrease in pain of NRPS to </=3/10 facilitate improvement in movement, function, and ADLs as indicated by Functional Deficits. []? Progressing: []? Met: []? Not Met: []? Adjusted      Long Term Goals: To be achieved in: 8 weeks  1. Disability index score of 30% or less for the LEFS to assist with reaching prior level of function. []? Progressing: []? Met: []? Not Met: []? Adjusted      2.  Patient will demonstrate increased AROM to 0-130 to bilateral knees to allow for proper joint functioning as indicated by patients Functional Deficits. []? Progressing: []? Met: []? Not Met: []? Adjusted      3. Patient will demonstrate an increase in Strength to 5/5 to knee flex/ext allow for proper functional mobility as indicated by patients Functional Deficits. []? Progressing: []? Met: []? Not Met: []? Adjusted      4. Patient will return to functional walking activities with NRPS of </= 1/10. []? Progressing: []? Met: []? Not Met: []? Adjusted      5. Pt will be able to ascend/descend ladder with deviation to allow for return to work activities.  (patient specific functional goal)    []? Progressing: []? Met: []? Not Met: []? Adjusted                ASSESSMENT:  See eval    Patient received education on their current pathology and how their condition effects them with their functional activities. Patient understood discussion and questions were answered. Patient understands their activity limitations and understands rational for treatment progression. Pt educated on plan of care and HEP, if worsening symptoms to d/c that exercise. Return to Play: (if applicable)   []  Stage 1: Intro to Strength   []  Stage 2: Return to Run and Strength   []  Stage 3: Return to Jump and Strength   []  Stage 4: Dynamic Strength and Agility   []  Stage 5: Sport Specific Training     []  Ready to Return to Play, Meets All Above Stages   []  Not Ready for Return to Sports   Comments:                               PLAN: See anderson  [x] Continue per plan of care [] Alter current plan (see comments above)  [] Plan of care initiated [] Hold pending MD visit [] Discharge      Electronically signed by:  William Ceron PT    Note: If patient does not return for scheduled/ recommended follow up visits, this note will serve as a discharge from care along with most recent update on progress.

## 2022-01-10 ENCOUNTER — HOSPITAL ENCOUNTER (OUTPATIENT)
Dept: PHYSICAL THERAPY | Age: 37
Setting detail: THERAPIES SERIES
Discharge: HOME | End: 2022-01-10
Payer: MEDICAID

## 2022-01-10 NOTE — FLOWSHEET NOTE
[address] [facility], [address] [place] [address] [address]  Phone: [phone]   Fax:     [phone]    Physical Therapy  Cancellation/No-show Note  Patient Name:  Velma Dutton  :  1985   Date:  1/10/2022    Cancelled visits to date: 1  No-shows to date: 0    For today's appointment patient:  [x]  Cancelled  []  Rescheduled appointment  []  No-show     Reason given by patient:  []  Patient ill  []  Conflicting appointment  []  No transportation    []  Conflict with work  []  No reason given  []  Other:     Comments:      Phone call information:   []  Phone call made today to patient at am/pm at the number provided:      []  Patient answered, conversation as follows:    []  Patient did not answer, message left as follows:  [x]  Phone call not needed - pt contacted us to cancel and provided reason for cancellation.      Electronically signed by:  Yanna Hernandez, PT,

## 2022-01-11 ENCOUNTER — HOSPITAL ENCOUNTER (OUTPATIENT)
Dept: PHYSICAL THERAPY | Age: 37
Setting detail: THERAPIES SERIES
Discharge: HOME | End: 2022-01-11
Payer: MEDICAID

## 2022-01-11 PROCEDURE — 97112 NEUROMUSCULAR REEDUCATION: CPT

## 2022-01-11 PROCEDURE — 97110 THERAPEUTIC EXERCISES: CPT

## 2022-01-11 NOTE — FLOWSHEET NOTE
[address] [facility], [address] [place] [address] [address]  Phone: [phone]   Fax:     [phone]      Physical Therapy Treatment Note/ Progress Report:           Date:  2022    Patient Name:  Andrew Hawley    :  1985  MRN: [id number]  Restrictions/Precautions:    Medical/Treatment Diagnosis Information:  · Diagnosis: M22.41, M22.42 (ICD-10-CM) - Chondromalacia of both patellae; S83.282D (ICD-10-CM) - Acute lateral meniscus tear of left knee, subsequent encounter  · Treatment Diagnosis: Bilateral knee pain, decreased ROM, flexibility, strength  Insurance/Certification information:  PT Insurance Information: [facility]  Physician Information:    Has the plan of care been signed (Y/N):        []  Yes  []  No     Date of Patient follow up with Physician:     Assessment Summary: Sharron Gray is a 39 y.o. male reporting to OP PT with c/c of bilateral knee pain and knee scopes bilaterally, left on 21 and right on 21. Pt is noted to have reduced ROM, flexibility and strength. Overall reduced functional mobility. Will progress as tolerated. Is this a Progress Report:     []  Yes  [x]  No        If Yes:  Date Range for reporting period:  Beginning   22  Ending 22    Progress report will be due (10 Rx or 30 days whichever is less): 88       Recertification will be due (POC Duration  / 90 days whichever is less): 3/4/22          Visit # Insurance Allowable Auth Required   In Person  30 []  Yes     []  No    Tele Health   []  Yes     []  No    Total 2             Functional Scale: LEFS 60%    Date assessed:        Latex Allergy:  [x]NO      []YES  Preferred Language for Healthcare:   [x]English       []other:      Pain level:  6/10     SUBJECTIVE:  Pt states he is sore. Because of wife and kids didn't really do much of HEP.        OBJECTIVE:       RESTRICTIONS/PRECAUTIONS: None    Exercises/Interventions: HEP code: [id number]  Therapeutic Ex (14417) HEP 1/4/22 1/11/22    Warm-up       Rec bike   6', Lv 3           TABLE       Heel slides x x10 B     Strap gastroc stretch x 1' B     Hs stretch x 1' B 1' B    Quad set  x10 B     SLR x 10x2 B     Bridge x x15 10x3           SEATED                                                 STANDING       Wedge gastroc stretch   1'    Anterior step ups   x15 B, 4\"    Resisted side steps   3'x10 laps    Knee extension machine   10x2, 25#                                                                                   Manual (23184): Prone quad stretch 1' B    Therapeutic Exercise and NMR EXR  [x] (35948) Provided verbal/tactile cueing for activities related to strengthening, flexibility, endurance, ROM for improvements in LE, proximal hip, and core control with self care, mobility, lifting, ambulation. [x] (23327) Provided verbal/tactile cueing for activities related to improving balance, coordination, kinesthetic sense, posture, motor skill, proprioception to assist with LE, proximal hip, and core control in self-care, mobility, lifting, ambulation and eccentric single leg control.      NMR and Therapeutic Activities:    [x] (66620 or 31537) Provided verbal/tactile cueing for activities related to improving balance, coordination, kinesthetic sense, posture, motor skill, proprioception and motor activation to allow for proper function of core, proximal hip and LE with self-care and ADLs and functional mobility.   [] (49369) Gait Re-education- Provided training and instruction to the patient for proper LE, core and proximal hip recruitment and positioning and eccentric body weight control with ambulation re-education including up and down stairs     Home Exercise Program:    [x] (70795) Reviewed/Progressed HEP activities related to strengthening, flexibility, endurance, ROM of core, proximal hip and LE for functional self-care, mobility, lifting and ambulation/stair navigation   [] (34388) Reviewed/Progressed HEP activities related to improving balance, coordination, kinesthetic sense, posture, motor skill, proprioception of core, proximal hip and LE for self-care, mobility, lifting, and ambulation/stair navigation      Manual Treatments:  PROM / STM / Oscillations-Mobs:  G-I, II, III, IV (PA's, Inf., Post.)  [x] (56911) Provided manual therapy to mobilize LE, proximal hip and/or LS spine soft tissue/joints for the purpose of modulating pain, promoting relaxation, increasing ROM, reducing/eliminating soft tissue swelling/inflammation/restriction, improving soft tissue extensibility and allowing for proper ROM for normal function with self-care, mobility, lifting and ambulation. Modalities:     [] GAME READY (VASO)- for significant edema, swelling, pain control. Charges:  Timed Code Treatment Minutes: 45   Total Treatment Minutes:  45   BWC:  TE TIME:  NMR TIME:  MANUAL TIME:   TA  UNTIMED MINUTES:  Medicare Total:   30  15              [] EVAL (LOW) 12402 (typically 20 minutes face-to-face)  [] EVAL (MOD) 45718 (typically 30 minutes face-to-face)  [] EVAL (HIGH) 89839 (typically 45 minutes face-to-face)  [] RE-EVAL     [x] HF(40016) 2     [] IONTO  [x] NMR (53972) 1     [] VASO  [] Manual (46417) x     [] Dry Needle:  [] TA x      [] Mech Traction (49743)  [] ES(attended) (10496)      [] ES (un) (38976):        GOALS:     Patient stated goal: Improve mobility, return to work      Therapist goals for Patient:   Short Term Goals: To be achieved in: 2 weeks  1. Independent in HEP and progression per patient tolerance, in order to prevent re-injury. []? Progressing: []? Met: []? Not Met: []? Adjusted      2. Patient will have a decrease in pain of NRPS to </=3/10 facilitate improvement in movement, function, and ADLs as indicated by Functional Deficits. []? Progressing: []? Met: []? Not Met: []? Adjusted      Long Term Goals: To be achieved in: 8 weeks  1.  Disability index score of 30% or less for the LEFS to assist with reaching prior level of function. []? Progressing: []? Met: []? Not Met: []? Adjusted      2. Patient will demonstrate increased AROM to 0-130 to bilateral knees to allow for proper joint functioning as indicated by patients Functional Deficits. []? Progressing: []? Met: []? Not Met: []? Adjusted      3. Patient will demonstrate an increase in Strength to 5/5 to knee flex/ext allow for proper functional mobility as indicated by patients Functional Deficits. []? Progressing: []? Met: []? Not Met: []? Adjusted      4. Patient will return to functional walking activities with NRPS of </= 1/10. []? Progressing: []? Met: []? Not Met: []? Adjusted      5. Pt will be able to ascend/descend ladder with deviation to allow for return to work activities.  (patient specific functional goal)    []? Progressing: []? Met: []? Not Met: []? Adjusted                ASSESSMENT:  Pt joycelyn session fair. Frequent rest required. Patient received education on their current pathology and how their condition effects them with their functional activities. Patient understood discussion and questions were answered. Patient understands their activity limitations and understands rational for treatment progression. Pt educated on plan of care and HEP, if worsening symptoms to d/c that exercise.            Return to Play: (if applicable)   []  Stage 1: Intro to Strength   []  Stage 2: Return to Run and Strength   []  Stage 3: Return to Jump and Strength   []  Stage 4: Dynamic Strength and Agility   []  Stage 5: Sport Specific Training     []  Ready to Return to Play, Meets All Above Stages   []  Not Ready for Return to Sports   Comments:                               PLAN: See eval  [x] Continue per plan of care [] Alter current plan (see comments above)  [] Plan of care initiated [] Hold pending MD visit [] Discharge      Electronically signed by:  Smita Temple PT    Note: If patient does not return for scheduled/ recommended follow up visits, this note will serve as a discharge from care along with most recent update on progress.

## 2022-01-14 ENCOUNTER — HOSPITAL ENCOUNTER (OUTPATIENT)
Dept: PHYSICAL THERAPY | Age: 37
Setting detail: THERAPIES SERIES
Discharge: HOME | End: 2022-01-14
Payer: MEDICAID

## 2022-01-14 PROCEDURE — 97110 THERAPEUTIC EXERCISES: CPT

## 2022-01-14 PROCEDURE — 97112 NEUROMUSCULAR REEDUCATION: CPT

## 2022-01-14 NOTE — FLOWSHEET NOTE
[address] [place] [facility], [address] [place] [address] [address]  Phone: [phone]   Fax:     [phone]      Physical Therapy Treatment Note/ Progress Report:           Date:  2022    Patient Name:  Sunday Dugan    :  1985  MRN: [id number]  Restrictions/Precautions:    Medical/Treatment Diagnosis Information:  · Diagnosis: M22.41, M22.42 (ICD-10-CM) - Chondromalacia of both patellae; S83.282D (ICD-10-CM) - Acute lateral meniscus tear of left knee, subsequent encounter  · Treatment Diagnosis: Bilateral knee pain, decreased ROM, flexibility, strength  Insurance/Certification information:  PT Insurance Information: Bethanie Ruelas  Physician Information:    Has the plan of care been signed (Y/N):        []  Yes  []  No     Date of Patient follow up with Physician:     Assessment Summary: Guanaco Ann is a 39 y.o. male reporting to OP PT with c/c of bilateral knee pain and knee scopes bilaterally, left on 21 and right on 21. Pt is noted to have reduced ROM, flexibility and strength. Overall reduced functional mobility. Will progress as tolerated. Is this a Progress Report:     []  Yes  [x]  No        If Yes:  Date Range for reporting period:  Beginning   22  Ending 22    Progress report will be due (10 Rx or 30 days whichever is less): 79       Recertification will be due (POC Duration  / 90 days whichever is less): 3/4/22          Visit # Insurance Allowable Auth Required   In Person  30 []  Yes     []  No    Tele Health   []  Yes     []  No    Total 3             Functional Scale: LEFS 60%    Date assessed:        Latex Allergy:  [x]NO      []YES  Preferred Language for Healthcare:   [x]English       []other:      Pain level:  5/10     SUBJECTIVE:  Pt states he is sore. 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        OBJECTIVE:       RESTRICTIONS/PRECAUTIONS: None    Exercises/Interventions: HEP code: [address] [facility] [address]  Therapeutic Ex (28578) HEP 1/4/22 1/11/22 1/14/22   Warm-up       Rec bike   6', Lv 3 6', Lv 3          TABLE       Heel slides x x10 B  --   Strap gastroc stretch x 1' B  --   Hs stretch x 1' B 1' B 1' B   Quad set  x10 B  --   SLR x 10x2 B  --   Bridge x x15 10x3 --          SEATED                                                 STANDING       Wedge gastroc stretch   1' 1'   Heel raises 1/2 roll    10x2   Toe raises 1/2 roll    10x2   Anterior step ups   x15 B, 4\" x20 B, 4\"   Resisted side steps   3'x10 laps    Knee extension machine   10x2, 25# --   HS curl machine    10x2, 20#   Retrowalk CC    X10, 35#                                                                    Manual (24450): Therapeutic Exercise and NMR EXR  [x] (94517) Provided verbal/tactile cueing for activities related to strengthening, flexibility, endurance, ROM for improvements in LE, proximal hip, and core control with self care, mobility, lifting, ambulation. [x] (52163) Provided verbal/tactile cueing for activities related to improving balance, coordination, kinesthetic sense, posture, motor skill, proprioception to assist with LE, proximal hip, and core control in self-care, mobility, lifting, ambulation and eccentric single leg control.      NMR and Therapeutic Activities:    [x] (67128 or 07491) Provided verbal/tactile cueing for activities related to improving balance, coordination, kinesthetic sense, posture, motor skill, proprioception and motor activation to allow for proper function of core, proximal hip and LE with self-care and ADLs and functional mobility.   [] (07209) Gait Re-education- Provided training and instruction to the patient for proper LE, core and proximal hip recruitment and positioning and eccentric body weight control with ambulation re-education including up and down stairs     Home Exercise Program:    [x] (55635) Reviewed/Progressed HEP activities related to strengthening, flexibility, endurance, ROM of core, proximal hip and LE for functional self-care, mobility, lifting and ambulation/stair navigation   [] (55717) Reviewed/Progressed HEP activities related to improving balance, coordination, kinesthetic sense, posture, motor skill, proprioception of core, proximal hip and LE for self-care, mobility, lifting, and ambulation/stair navigation      Manual Treatments:  PROM / STM / Oscillations-Mobs:  G-I, II, III, IV (PA's, Inf., Post.)  [x] (89075) Provided manual therapy to mobilize LE, proximal hip and/or LS spine soft tissue/joints for the purpose of modulating pain, promoting relaxation, increasing ROM, reducing/eliminating soft tissue swelling/inflammation/restriction, improving soft tissue extensibility and allowing for proper ROM for normal function with self-care, mobility, lifting and ambulation. Modalities:     [] GAME READY (VASO)- for significant edema, swelling, pain control. Charges:  Timed Code Treatment Minutes: 40   Total Treatment Minutes:  40   BWC:  TE TIME:  NMR TIME:  MANUAL TIME:   TA  UNTIMED MINUTES:  Medicare Total:   30  10              [] EVAL (LOW) 84325 (typically 20 minutes face-to-face)  [] EVAL (MOD) 17804 (typically 30 minutes face-to-face)  [] EVAL (HIGH) 32685 (typically 45 minutes face-to-face)  [] RE-EVAL     [x] LH(57040) 2     [] IONTO  [x] NMR (40671) 1     [] VASO  [] Manual (90544) x     [] Dry Needle:  [] TA x      [] Mech Traction (73454)  [] ES(attended) (55315)      [] ES (un) (18381):        GOALS:     Patient stated goal: Improve mobility, return to work      Therapist goals for Patient:   Short Term Goals: To be achieved in: 2 weeks  1. Independent in HEP and progression per patient tolerance, in order to prevent re-injury. []? Progressing: []? Met: []? Not Met: []? Adjusted      2. Patient will have a decrease in pain of NRPS to </=3/10 facilitate improvement in movement, function, and ADLs as indicated by Functional Deficits.   []? Progressing: []? Met: []? Not Met: []? Adjusted      Long Term Goals: To be achieved in: 8 weeks  1. Disability index score of 30% or less for the LEFS to assist with reaching prior level of function. []? Progressing: []? Met: []? Not Met: []? Adjusted      2. Patient will demonstrate increased AROM to 0-130 to bilateral knees to allow for proper joint functioning as indicated by patients Functional Deficits. []? Progressing: []? Met: []? Not Met: []? Adjusted      3. Patient will demonstrate an increase in Strength to 5/5 to knee flex/ext allow for proper functional mobility as indicated by patients Functional Deficits. []? Progressing: []? Met: []? Not Met: []? Adjusted      4. Patient will return to functional walking activities with NRPS of </= 1/10. []? Progressing: []? Met: []? Not Met: []? Adjusted      5. Pt will be able to ascend/descend ladder with deviation to allow for return to work activities.  (patient specific functional goal)    []? Progressing: []? Met: []? Not Met: []? Adjusted                ASSESSMENT:  Pt joycelyn session fair. Frequent rest required. Slow progressions through exercises. Patient received education on their current pathology and how their condition effects them with their functional activities. Patient understood discussion and questions were answered. Patient understands their activity limitations and understands rational for treatment progression. Pt educated on plan of care and HEP, if worsening symptoms to d/c that exercise.            Return to Play: (if applicable)   []  Stage 1: Intro to Strength   []  Stage 2: Return to Run and Strength   []  Stage 3: Return to Jump and Strength   []  Stage 4: Dynamic Strength and Agility   []  Stage 5: Sport Specific Training     []  Ready to Return to Play, Meets All Above Stages   []  Not Ready for Return to Sports   Comments:                               PLAN: See eval  [x] Continue per plan of care [] Alter current plan (see comments above)  [] Plan of care initiated [] Hold pending MD visit [] Discharge      Electronically signed by:  Bartolo Renee PT    Note: If patient does not return for scheduled/ recommended follow up visits, this note will serve as a discharge from care along with most recent update on progress.

## 2022-01-15 ENCOUNTER — CLINICAL DOCUMENTATION (OUTPATIENT)
Dept: OTHER | Age: 37
End: 2022-01-15

## 2022-01-18 ENCOUNTER — APPOINTMENT (OUTPATIENT)
Dept: PHYSICAL THERAPY | Age: 37
End: 2022-01-18
Payer: MEDICAID

## 2022-01-21 ENCOUNTER — APPOINTMENT (OUTPATIENT)
Dept: PHYSICAL THERAPY | Age: 37
End: 2022-01-21
Payer: MEDICAID

## 2022-01-25 ENCOUNTER — OFFICE VISIT (OUTPATIENT)
Dept: ORTHOPEDIC SURGERY | Age: 37
End: 2022-01-25

## 2022-01-25 ENCOUNTER — HOSPITAL ENCOUNTER (OUTPATIENT)
Dept: PHYSICAL THERAPY | Age: 37
Setting detail: THERAPIES SERIES
Discharge: HOME | End: 2022-01-25
Payer: MEDICAID

## 2022-01-25 VITALS — WEIGHT: 233 LBS | BODY MASS INDEX: 33.36 KG/M2 | HEIGHT: 70 IN

## 2022-01-25 DIAGNOSIS — M22.42 CHONDROMALACIA OF BOTH PATELLAE: Primary | ICD-10-CM

## 2022-01-25 DIAGNOSIS — M22.41 CHONDROMALACIA OF BOTH PATELLAE: Primary | ICD-10-CM

## 2022-01-25 PROCEDURE — 97110 THERAPEUTIC EXERCISES: CPT

## 2022-01-25 PROCEDURE — 99024 POSTOP FOLLOW-UP VISIT: CPT | Performed by: ORTHOPAEDIC SURGERY

## 2022-01-25 PROCEDURE — 97112 NEUROMUSCULAR REEDUCATION: CPT

## 2022-01-25 RX ORDER — FLUOXETINE HYDROCHLORIDE 20 MG/1
CAPSULE ORAL
COMMUNITY
Start: 2021-10-22

## 2022-01-25 RX ORDER — PANTOPRAZOLE SODIUM 40 MG/1
40 TABLET, DELAYED RELEASE ORAL
COMMUNITY
Start: 2021-08-11

## 2022-01-25 NOTE — FLOWSHEET NOTE
[address] [facility], [address], [address] [facility] [address]  Phone: [phone]   Fax:     [phone]      Physical Therapy Treatment Note/ Progress Report:           Date:  2022    Patient Name:  Hortensia Schmitt    :  1985  MRN: [id number]  Restrictions/Precautions:    Medical/Treatment Diagnosis Information:  · Diagnosis: M22.41, M22.42 (ICD-10-CM) - Chondromalacia of both patellae; S83.282D (ICD-10-CM) - Acute lateral meniscus tear of left knee, subsequent encounter  · Treatment Diagnosis: Bilateral knee pain, decreased ROM, flexibility, strength  Insurance/Certification information:  PT Insurance Information: Melina Delaney  Physician Information:    Has the plan of care been signed (Y/N):        []  Yes  []  No     Date of Patient follow up with Physician:     Assessment Summary: Samy Mendoza is a 39 y.o. male reporting to OP PT with c/c of bilateral knee pain and knee scopes bilaterally, left on 21 and right on 21. Pt is noted to have reduced ROM, flexibility and strength. Overall reduced functional mobility. Will progress as tolerated.        Is this a Progress Report:     []  Yes  [x]  No        If Yes:  Date Range for reporting period:  Beginning   22  Ending 22    Progress report will be due (10 Rx or 30 days whichever is less): 77       Recertification will be due (POC Duration  / 90 days whichever is less): 3/4/22          Visit # Insurance Allowable Auth Required   In Person  30 []  Yes     []  No    Tele Health   []  Yes     []  No    Total 4             Functional Scale: LEFS 60%    Date assessed:        Latex Allergy:  [x]NO      []YES  Preferred Language for Healthcare:   [x]English       []other:      Pain level:  10     SUBJECTIVE:  Pt states knees are sore      OBJECTIVE:       RESTRICTIONS/PRECAUTIONS: None    Exercises/Interventions: HEP code: [facility]  Therapeutic Ex (44739) HEP 22 1/14/22 1/25/22   Warm-up        Rec bike   6', Lv 3 6', Lv 3 6', Lv 5           TABLE        Heel slides x x10 B  --    Strap gastroc stretch x 1' B  --    Hs stretch x 1' B 1' B 1' B    Quad set  x10 B  --    SLR x 10x2 B  --    Bridge x x15 10x3 --            SEATED                                                        STANDING        Wedge gastroc stretch   1' 1' 1'   Heel raises 1/2 roll    10x2 10x3   Toe raises 1/2 roll    10x2 10x3   HS stretch     1' B   Anterior step ups   x15 B, 4\" x20 B, 4\" x20 B, 4\"   Resisted side steps   3'x10 laps  3'x10 laps, OTB   Knee extension machine   10x2, 25# -- 10x3, 20#   HS curl machine    10x2, 20# 10x3, 30#   Retrowalk CC    X10, 35#    BOSU balance                                                                          Manual (75506): Therapeutic Exercise and NMR EXR  [x] (39909) Provided verbal/tactile cueing for activities related to strengthening, flexibility, endurance, ROM for improvements in LE, proximal hip, and core control with self care, mobility, lifting, ambulation. [x] (09613) Provided verbal/tactile cueing for activities related to improving balance, coordination, kinesthetic sense, posture, motor skill, proprioception to assist with LE, proximal hip, and core control in self-care, mobility, lifting, ambulation and eccentric single leg control.      NMR and Therapeutic Activities:    [x] (87343 or 03789) Provided verbal/tactile cueing for activities related to improving balance, coordination, kinesthetic sense, posture, motor skill, proprioception and motor activation to allow for proper function of core, proximal hip and LE with self-care and ADLs and functional mobility.   [] (10238) Gait Re-education- Provided training and instruction to the patient for proper LE, core and proximal hip recruitment and positioning and eccentric body weight control with ambulation re-education including up and down stairs     Home Exercise Program:    [x] (30059) Reviewed/Progressed HEP activities related to strengthening, flexibility, endurance, ROM of core, proximal hip and LE for functional self-care, mobility, lifting and ambulation/stair navigation   [] (81972) Reviewed/Progressed HEP activities related to improving balance, coordination, kinesthetic sense, posture, motor skill, proprioception of core, proximal hip and LE for self-care, mobility, lifting, and ambulation/stair navigation      Manual Treatments:  PROM / STM / Oscillations-Mobs:  G-I, II, III, IV (PA's, Inf., Post.)  [x] (68857) Provided manual therapy to mobilize LE, proximal hip and/or LS spine soft tissue/joints for the purpose of modulating pain, promoting relaxation, increasing ROM, reducing/eliminating soft tissue swelling/inflammation/restriction, improving soft tissue extensibility and allowing for proper ROM for normal function with self-care, mobility, lifting and ambulation. Modalities:     [] GAME READY (VASO)- for significant edema, swelling, pain control. Charges:  Timed Code Treatment Minutes: 40   Total Treatment Minutes:  40   BWC:  TE TIME:  NMR TIME:  MANUAL TIME:   TA  UNTIMED MINUTES:  Medicare Total:   30  10              [] EVAL (LOW) 06228 (typically 20 minutes face-to-face)  [] EVAL (MOD) 75145 (typically 30 minutes face-to-face)  [] EVAL (HIGH) 06016 (typically 45 minutes face-to-face)  [] RE-EVAL     [x] WV(09364) 2     [] IONTO  [x] NMR (79057) 1     [] VASO  [] Manual (72575) x     [] Dry Needle:  [] TA x      [] Mech Traction (15341)  [] ES(attended) (08094)      [] ES (un) (98161):        GOALS:     Patient stated goal: Improve mobility, return to work      Therapist goals for Patient:   Short Term Goals: To be achieved in: 2 weeks  1. Independent in HEP and progression per patient tolerance, in order to prevent re-injury. []? Progressing: []? Met: []? Not Met: []? Adjusted      2.  Patient will have a decrease in pain of NRPS to </=3/10 facilitate improvement in movement, function, and ADLs as indicated by Functional Deficits. []? Progressing: []? Met: []? Not Met: []? Adjusted      Long Term Goals: To be achieved in: 8 weeks  1. Disability index score of 30% or less for the LEFS to assist with reaching prior level of function. []? Progressing: []? Met: []? Not Met: []? Adjusted      2. Patient will demonstrate increased AROM to 0-130 to bilateral knees to allow for proper joint functioning as indicated by patients Functional Deficits. []? Progressing: []? Met: []? Not Met: []? Adjusted      3. Patient will demonstrate an increase in Strength to 5/5 to knee flex/ext allow for proper functional mobility as indicated by patients Functional Deficits. []? Progressing: []? Met: []? Not Met: []? Adjusted      4. Patient will return to functional walking activities with NRPS of </= 1/10. []? Progressing: []? Met: []? Not Met: []? Adjusted      5. Pt will be able to ascend/descend ladder with deviation to allow for return to work activities.  (patient specific functional goal)    []? Progressing: []? Met: []? Not Met: []? Adjusted                ASSESSMENT:  Pt joycelyn session fair. Functional mobility continues to be limited from expected ranges. Patient received education on their current pathology and how their condition effects them with their functional activities. Patient understood discussion and questions were answered. Patient understands their activity limitations and understands rational for treatment progression. Pt educated on plan of care and HEP, if worsening symptoms to d/c that exercise.                PLAN: See eval  [x] Continue per plan of care [] Alter current plan (see comments above)  [] Plan of care initiated [] Hold pending MD visit [] Discharge      Electronically signed by:  Miroslava Valle PT    Note: If patient does not return for scheduled/ recommended follow up visits, this note will serve as a discharge from care along with most recent update on progress.

## 2022-01-28 ENCOUNTER — HOSPITAL ENCOUNTER (OUTPATIENT)
Dept: PHYSICAL THERAPY | Age: 37
Setting detail: THERAPIES SERIES
Discharge: HOME | End: 2022-01-28
Payer: MEDICAID

## 2022-01-28 PROCEDURE — 97110 THERAPEUTIC EXERCISES: CPT

## 2022-01-28 PROCEDURE — 97530 THERAPEUTIC ACTIVITIES: CPT

## 2022-01-28 PROCEDURE — 97112 NEUROMUSCULAR REEDUCATION: CPT

## 2022-01-28 NOTE — FLOWSHEET NOTE
[address] [place] [address] [facility], [address] [place] [address] [address]  Phone: [phone]   Fax:     [phone]      Physical Therapy Treatment Note/ Progress Report:           Date:  2022    Patient Name:  Augustin Dickens    :  1985  MRN: [id number]  Restrictions/Precautions:    Medical/Treatment Diagnosis Information:  · Diagnosis: M22.41, M22.42 (ICD-10-CM) - Chondromalacia of both patellae; S83.282D (ICD-10-CM) - Acute lateral meniscus tear of left knee, subsequent encounter  · Treatment Diagnosis: Bilateral knee pain, decreased ROM, flexibility, strength  Insurance/Certification information:  PT Insurance Information: Doree Mcardle  Physician Information:    Has the plan of care been signed (Y/N):        []  Yes  []  No     Date of Patient follow up with Physician:     Assessment Summary: Sally Pepe is a 39 y.o. male reporting to OP PT with c/c of bilateral knee pain and knee scopes bilaterally, left on 21 and right on 21. Pt is noted to have reduced ROM, flexibility and strength. Overall reduced functional mobility. Will progress as tolerated. Is this a Progress Report:     []  Yes  [x]  No        If Yes:  Date Range for reporting period:  Beginning   22  Ending 22    Progress report will be due (10 Rx or 30 days whichever is less):        Recertification will be due (POC Duration  / 90 days whichever is less): 3/4/22          Visit # Insurance Allowable Auth Required   In Person  30 []  Yes     []  No    Tele Health   []  Yes     []  No    Total 5             Functional Scale: LEFS 60%    Date assessed:        Latex Allergy:  [x]NO      []YES  Preferred Language for Healthcare:   [x]English       []other:      Pain level:  6/10     SUBJECTIVE:  Pt states knee still get sharp pains.        OBJECTIVE:       RESTRICTIONS/PRECAUTIONS: None    Exercises/Interventions: HEP code: Vanessa Naqvi  Therapeutic Ex (20945) HEP 22 1/25/22 1/28/22   Warm-up       Rec bike  6', Lv 3 6', Lv 5 7', Lv 5          TABLE       Heel slides x --     Strap gastroc stretch x --     Hs stretch x 1' B     Quad set  --     SLR x --  10x2 B   Bridge x --  10x3          SEATED                                                 STANDING       Wedge gastroc stretch  1' 1' 1' B   Heel raises 1/2 roll  10x2 10x3 10x3   Toe raises 1/2 roll  10x2 10x3 10x3   HS stretch   1' B 1' B   Anterior step ups  x20 B, 4\" x20 B, 4\" x20 B, 4\"   Lateral step ups    x20 B, 4\"   Resisted side steps   3'x10 laps, OTB    Knee extension machine  -- 10x3, 20# --   HS curl machine  10x2, 20# 10x3, 30# 10x2, 40#   Retrowalk CC  X10, 35#  --   BOSU balance    1'x2   BOSU marches    1'                                                      Manual (62294): Prone quad stretch 1' B    Therapeutic Exercise and NMR EXR  [x] (37134) Provided verbal/tactile cueing for activities related to strengthening, flexibility, endurance, ROM for improvements in LE, proximal hip, and core control with self care, mobility, lifting, ambulation. [x] (83374) Provided verbal/tactile cueing for activities related to improving balance, coordination, kinesthetic sense, posture, motor skill, proprioception to assist with LE, proximal hip, and core control in self-care, mobility, lifting, ambulation and eccentric single leg control.      NMR and Therapeutic Activities:    [x] (84056 or 76916) Provided verbal/tactile cueing for activities related to improving balance, coordination, kinesthetic sense, posture, motor skill, proprioception and motor activation to allow for proper function of core, proximal hip and LE with self-care and ADLs and functional mobility.   [] (48811) Gait Re-education- Provided training and instruction to the patient for proper LE, core and proximal hip recruitment and positioning and eccentric body weight control with ambulation re-education including up and down stairs     Home Exercise Program:    [x] (76735) Reviewed/Progressed HEP activities related to strengthening, flexibility, endurance, ROM of core, proximal hip and LE for functional self-care, mobility, lifting and ambulation/stair navigation   [] (19420) Reviewed/Progressed HEP activities related to improving balance, coordination, kinesthetic sense, posture, motor skill, proprioception of core, proximal hip and LE for self-care, mobility, lifting, and ambulation/stair navigation      Manual Treatments:  PROM / STM / Oscillations-Mobs:  G-I, II, III, IV (PA's, Inf., Post.)  [x] (04639) Provided manual therapy to mobilize LE, proximal hip and/or LS spine soft tissue/joints for the purpose of modulating pain, promoting relaxation, increasing ROM, reducing/eliminating soft tissue swelling/inflammation/restriction, improving soft tissue extensibility and allowing for proper ROM for normal function with self-care, mobility, lifting and ambulation. Modalities:     [] GAME READY (VASO)- for significant edema, swelling, pain control. Charges:  Timed Code Treatment Minutes: 53   Total Treatment Minutes:  53   BWC:  TE TIME:  NMR TIME:  MANUAL TIME:   TA  UNTIMED MINUTES:  Medicare Total:   33  10    10          [] EVAL (LOW) 46347 (typically 20 minutes face-to-face)  [] EVAL (MOD) 44386 (typically 30 minutes face-to-face)  [] EVAL (HIGH) 79896 (typically 45 minutes face-to-face)  [] RE-EVAL     [x] NH(98775) 2     [] IONTO  [x] NMR (24330) 1     [] VASO  [] Manual (64902) x     [] Dry Needle:  [x] TA 1      [] Mech Traction (13877)  [] ES(attended) (56131)      [] ES (un) (23284):        GOALS:     Patient stated goal: Improve mobility, return to work      Therapist goals for Patient:   Short Term Goals: To be achieved in: 2 weeks  1. Independent in HEP and progression per patient tolerance, in order to prevent re-injury. []? Progressing: []? Met: []? Not Met: []? Adjusted      2.  Patient will have a decrease in pain of NRPS to </=3/10 facilitate improvement in movement, function, and ADLs as indicated by Functional Deficits. []? Progressing: []? Met: []? Not Met: []? Adjusted      Long Term Goals: To be achieved in: 8 weeks  1. Disability index score of 30% or less for the LEFS to assist with reaching prior level of function. []? Progressing: []? Met: []? Not Met: []? Adjusted      2. Patient will demonstrate increased AROM to 0-130 to bilateral knees to allow for proper joint functioning as indicated by patients Functional Deficits. []? Progressing: []? Met: []? Not Met: []? Adjusted      3. Patient will demonstrate an increase in Strength to 5/5 to knee flex/ext allow for proper functional mobility as indicated by patients Functional Deficits. []? Progressing: []? Met: []? Not Met: []? Adjusted      4. Patient will return to functional walking activities with NRPS of </= 1/10. []? Progressing: []? Met: []? Not Met: []? Adjusted      5. Pt will be able to ascend/descend ladder with deviation to allow for return to work activities.  (patient specific functional goal)    []? Progressing: []? Met: []? Not Met: []? Adjusted                ASSESSMENT:  Pt joycelyn session fair. Progresses through routine slowly with frequent rests. Quad extensibility is reduced and needs progressed. Patient received education on their current pathology and how their condition effects them with their functional activities. Patient understood discussion and questions were answered. Patient understands their activity limitations and understands rational for treatment progression. Pt educated on plan of care and HEP, if worsening symptoms to d/c that exercise.                PLAN: See eval  [x] Continue per plan of care [] Alter current plan (see comments above)  [] Plan of care initiated [] Hold pending MD visit [] Discharge      Electronically signed by:  Blaine Arteaga, PT    Note: If patient does not return for scheduled/ recommended follow up visits, this note will serve as a discharge from care along with most recent update on progress.

## 2022-01-31 ENCOUNTER — HOSPITAL ENCOUNTER (OUTPATIENT)
Dept: PHYSICAL THERAPY | Age: 37
Setting detail: THERAPIES SERIES
Discharge: HOME | End: 2022-01-31
Payer: MEDICAID

## 2022-01-31 PROCEDURE — 97530 THERAPEUTIC ACTIVITIES: CPT

## 2022-01-31 PROCEDURE — 97016 VASOPNEUMATIC DEVICE THERAPY: CPT

## 2022-01-31 PROCEDURE — 97112 NEUROMUSCULAR REEDUCATION: CPT

## 2022-01-31 PROCEDURE — 97110 THERAPEUTIC EXERCISES: CPT

## 2022-01-31 NOTE — FLOWSHEET NOTE
[address]19 [facility], [address] [place] [address] [address]  Phone: [phone]   Fax:     [phone]      Physical Therapy Treatment Note/ Progress Report:           Date:  2022    Patient Name:  Luis Cruz    :  1985  MRN: [id number]  Restrictions/Precautions:    Medical/Treatment Diagnosis Information:  · Diagnosis: M22.41, M22.42 (ICD-10-CM) - Chondromalacia of both patellae; S83.282D (ICD-10-CM) - Acute lateral meniscus tear of left knee, subsequent encounter  · Treatment Diagnosis: Bilateral knee pain, decreased ROM, flexibility, strength  Insurance/Certification information:  PT Insurance Information: [employer]pe Pack  Physician Information:    Has the plan of care been signed (Y/N):        []  Yes  []  No     Date of Patient follow up with Physician:     Assessment Summary: Svetlana Ojeda is a 39 y.o. male reporting to OP PT with c/c of bilateral knee pain and knee scopes bilaterally, left on 21 and right on 21. Pt is noted to have reduced ROM, flexibility and strength. Overall reduced functional mobility. Will progress as tolerated. Is this a Progress Report:     []  Yes  [x]  No        If Yes:  Date Range for reporting period:  Beginning   22  Ending 22    Progress report will be due (10 Rx or 30 days whichever is less):        Recertification will be due (POC Duration  / 90 days whichever is less): 3/4/22          Visit # Insurance Allowable Auth Required   In Person  30 []  Yes     []  No    Tele Health   []  Yes     []  No    Total 6             Functional Scale: LEFS 60%    Date assessed:        Latex Allergy:  [x]NO      []YES  Preferred Language for Healthcare:   [x]English       []other:      Pain level:  7/10     SUBJECTIVE:  Pt states knees still hurts, gets sharp pain in them      OBJECTIVE:   No edema, erythema, no calf tenderness.        RESTRICTIONS/PRECAUTIONS: None    Exercises/Interventions: HEP code: [id number]  Therapeutic Ex (61121) HEP 1/25/22 1/28/22 1/31/22   Warm-up       Rec bike  6', Lv 5 7', Lv 5 7'          TABLE       Heel slides x      Strap gastroc stretch x      Hs stretch x      Quad set       SLR x  10x2 B    Bridge x  10x3           SEATED                                                 STANDING       Wedge gastroc stretch  1' 1' B 1'    Heel raises 1/2 roll  10x3 10x3 10x3   Toe raises 1/2 roll  10x3 10x3 10x3   HS stretch  1' B 1' B 1'   Anterior step ups  x20 B, 4\" x20 B, 4\" x20 B, 4\"   Lateral step ups   x20 B, 4\" x20 B, 4\"   Resisted side steps  3'x10 laps, OTB  --   Knee extension machine  10x3, 20# -- --   HS curl machine  10x3, 30# 10x2, 40# --   COREY Abduction    x15 B, 45#   COREY Extension    x15 B, 45#   Leg Press DL    10x3, 60#   Retrowalk CC   -- --   BOSU balance   1'x2 1'   BOSU marches   1' --                                                      Manual (06085): Therapeutic Exercise and NMR EXR  [x] (07740) Provided verbal/tactile cueing for activities related to strengthening, flexibility, endurance, ROM for improvements in LE, proximal hip, and core control with self care, mobility, lifting, ambulation. [x] (43625) Provided verbal/tactile cueing for activities related to improving balance, coordination, kinesthetic sense, posture, motor skill, proprioception to assist with LE, proximal hip, and core control in self-care, mobility, lifting, ambulation and eccentric single leg control.      NMR and Therapeutic Activities:    [x] (12682 or 93206) Provided verbal/tactile cueing for activities related to improving balance, coordination, kinesthetic sense, posture, motor skill, proprioception and motor activation to allow for proper function of core, proximal hip and LE with self-care and ADLs and functional mobility.   [] (15946) Gait Re-education- Provided training and instruction to the patient for proper LE, core and proximal hip recruitment and positioning and eccentric body weight control with ambulation re-education including up and down stairs     Home Exercise Program:    [x] (19726) Reviewed/Progressed HEP activities related to strengthening, flexibility, endurance, ROM of core, proximal hip and LE for functional self-care, mobility, lifting and ambulation/stair navigation   [] (11432) Reviewed/Progressed HEP activities related to improving balance, coordination, kinesthetic sense, posture, motor skill, proprioception of core, proximal hip and LE for self-care, mobility, lifting, and ambulation/stair navigation      Manual Treatments:  PROM / STM / Oscillations-Mobs:  G-I, II, III, IV (PA's, Inf., Post.)  [x] (04577) Provided manual therapy to mobilize LE, proximal hip and/or LS spine soft tissue/joints for the purpose of modulating pain, promoting relaxation, increasing ROM, reducing/eliminating soft tissue swelling/inflammation/restriction, improving soft tissue extensibility and allowing for proper ROM for normal function with self-care, mobility, lifting and ambulation. Modalities:     [x] GAME READY (VASO)- for significant edema, swelling, pain control. Right LE, no adverse skin reaction      Charges:  Timed Code Treatment Minutes: 45   Total Treatment Minutes:  55   BWC:  TE TIME:  NMR TIME:  MANUAL TIME:   TA  UNTIMED MINUTES:  Medicare Total:   25  10    10          [] EVAL (LOW) 65871 (typically 20 minutes face-to-face)  [] EVAL (MOD) 28002 (typically 30 minutes face-to-face)  [] EVAL (HIGH) 11617 (typically 45 minutes face-to-face)  [] RE-EVAL     [x] JQ(34300) 1     [] IONTO  [x] NMR (87856) 1     [] VASO  [] Manual (49666) x     [] Dry Needle:  [x] TA 1      [] Mech Traction (48020)  [] ES(attended) (99309)      [] ES (un) (87321):        GOALS:     Patient stated goal: Improve mobility, return to work      Therapist goals for Patient:   Short Term Goals: To be achieved in: 2 weeks  1.  Independent in HEP and progression per patient tolerance, in order to prevent re-injury. []? Progressing: []? Met: []? Not Met: []? Adjusted      2. Patient will have a decrease in pain of NRPS to </=3/10 facilitate improvement in movement, function, and ADLs as indicated by Functional Deficits. []? Progressing: []? Met: []? Not Met: []? Adjusted      Long Term Goals: To be achieved in: 8 weeks  1. Disability index score of 30% or less for the LEFS to assist with reaching prior level of function. []? Progressing: []? Met: []? Not Met: []? Adjusted      2. Patient will demonstrate increased AROM to 0-130 to bilateral knees to allow for proper joint functioning as indicated by patients Functional Deficits. []? Progressing: []? Met: []? Not Met: []? Adjusted      3. Patient will demonstrate an increase in Strength to 5/5 to knee flex/ext allow for proper functional mobility as indicated by patients Functional Deficits. []? Progressing: []? Met: []? Not Met: []? Adjusted      4. Patient will return to functional walking activities with NRPS of </= 1/10. []? Progressing: []? Met: []? Not Met: []? Adjusted      5. Pt will be able to ascend/descend ladder with deviation to allow for return to work activities.  (patient specific functional goal)    []? Progressing: []? Met: []? Not Met: []? Adjusted                ASSESSMENT:  Pt joycelyn session fair. Addition of ice to help with pain reduction. Pain levels remains high and that is interfering with functional progress. Patient received education on their current pathology and how their condition effects them with their functional activities. Patient understood discussion and questions were answered. Patient understands their activity limitations and understands rational for treatment progression. Pt educated on plan of care and HEP, if worsening symptoms to d/c that exercise.                PLAN: See eval  [x] Continue per plan of care [] Alter current plan (see comments above)  [] Plan of care initiated [] Hold pending MD visit [] Discharge      Electronically signed by:  Rafiq Jeffrey PT    Note: If patient does not return for scheduled/ recommended follow up visits, this note will serve as a discharge from care along with most recent update on progress.

## 2022-02-03 ENCOUNTER — APPOINTMENT (OUTPATIENT)
Dept: PHYSICAL THERAPY | Age: 37
End: 2022-02-03
Payer: MEDICAID

## 2022-02-07 ENCOUNTER — HOSPITAL ENCOUNTER (OUTPATIENT)
Dept: PHYSICAL THERAPY | Age: 37
Setting detail: THERAPIES SERIES
Discharge: HOME | End: 2022-02-07
Payer: MEDICAID

## 2022-02-07 PROCEDURE — 97110 THERAPEUTIC EXERCISES: CPT

## 2022-02-07 PROCEDURE — 97530 THERAPEUTIC ACTIVITIES: CPT

## 2022-02-07 PROCEDURE — 97112 NEUROMUSCULAR REEDUCATION: CPT

## 2022-02-07 NOTE — PROGRESS NOTES
[address] [facility], [address] [place] [address] [address]  Phone: [phone]   Fax: [phone]    Date: 2022          Patient Name; :  Trevor Pinon; 1985   Dx: Diagnosis: M22.41, M22.42 (ICD-10-CM) - Chondromalacia of both patellae; S83.282D (ICD-10-CM) - Acute lateral meniscus tear of left knee, subsequent encounter      Physician: Referring Practitioner: Loni Robins MD        Total PT Visits: 7     Measures Previous Current   Pain (0-10)     Disability %           Assessment:  Dilan Tran has been seen in PT for 7 visits for bilateral knee pain. Pt has responded fair overall to PT session which have been addressing progressive functional strengthening, balance and flexibility. Pt continues to report higher levels of pain. Pt will continue to benefit from PT addressing deficits. in order to regain full function and improve QOL. Prognosis for POC: [x] Good [] Fair  [] Poor      Patient requires continued skilled intervention: [x] Yes  [] No        Plan & Recommendations:  [] Continue rehabilitation due to objective improvement and continued functional deficits with frequency and duration:   [] Progress toward  []GAP, []Work Conditioning, []Independent HEP   [] Discharge due to   [] All goals achieved, [] Maximized \"medical necessity\" [] No subjective or objective improvements      Electronically signed by:  Ambrose Smith, PT  Therapy Plan of Care Re-Certification  This patient has been re-evaluated for physical therapy services and for therapy to continue, Medicare, Medicaid and other insurances require periodic physician review of the treatment plan.  Please review the above re-evaluation and verify that you agree with plan of care as established above by signing the attached document and return it to our office or note changes to established plan below  [] Follow treatment plan as above [] Discontinue physical therapy  [] Change plan to:                                 __________________________________________________    Physician Signature:____________________________________ Date:____________  By signing above, therapists plan is approved by physician    If you have any questions or concerns, please don't hesitate to call.   Thank you for your referral.

## 2022-02-07 NOTE — FLOWSHEET NOTE
432 [facility], [address] [place] [address] [address]  Phone: [phone]   Fax:     [phone]      Physical Therapy Treatment Note/ Progress Report:           Date:  2022    Patient Name:  Jennifer Augustin    :  1985  MRN: [id number]  Restrictions/Precautions:    Medical/Treatment Diagnosis Information:  · Diagnosis: M22.41, M22.42 (ICD-10-CM) - Chondromalacia of both patellae; S83.282D (ICD-10-CM) - Acute lateral meniscus tear of left knee, subsequent encounter  · Treatment Diagnosis: Bilateral knee pain, decreased ROM, flexibility, strength  Insurance/Certification information:  PT Insurance Information: Marjorie Smith  Physician Information:    Has the plan of care been signed (Y/N):        []  Yes  []  No     Date of Patient follow up with Physician:     Assessment Summary: Luzma Razo is a 39 y.o. male reporting to OP PT with c/c of bilateral knee pain and knee scopes bilaterally, left on 21 and right on 21. Pt is noted to have reduced ROM, flexibility and strength. Overall reduced functional mobility. Will progress as tolerated. Is this a Progress Report:     []  Yes  [x]  No        If Yes:  Date Range for reporting period:  Beginning   22  Ending 22    Progress report will be due (10 Rx or 30 days whichever is less):        Recertification will be due (POC Duration  / 90 days whichever is less): 3/4/22          Visit # Insurance Allowable Auth Required   In Person  30 []  Yes     []  No    Tele Health   []  Yes     []  No    Total 7             Functional Scale: LEFS 60%    Date assessed:        Latex Allergy:  [x]NO      []YES  Preferred Language for Healthcare:   [x]English       []other:      Pain level:  10     SUBJECTIVE:  Pt states knee still hurt. Weather makes them worse. OBJECTIVE:   No edema, erythema, no calf tenderness.        RESTRICTIONS/PRECAUTIONS: None Exercises/Interventions: [facility] code: [address]  Therapeutic Ex (16351) HEP 1/28/22 1/31/22 2/7/22   Warm-up       Rec bike  7', Lv 5 7' 7', Lv 3          TABLE       Heel slides x      Strap gastroc stretch x      Hs stretch x      Quad set       SLR x 10x2 B     Bridge x 10x3            SEATED                                                 STANDING       Wedge gastroc stretch  1' B 1'  1'   Heel raises 1/2 roll  10x3 10x3 10x3   Toe raises 1/2 roll  10x3 10x3 10x3   HS stretch  1' B 1' 1l   Anterior step ups  x20 B, 4\" x20 B, 4\" x20 B, 4\"   Lateral step ups  x20 B, 4\" x20 B, 4\" x20 B, 4\"   Resisted side steps   -- 10'x3 laps, OTB   Knee extension machine  -- --    HS curl machine  10x2, 40# --    COREY Abduction   x15 B, 45#    [facility] [place] Extension   x15 B, 45#    Leg Press DL   10x3, 60#    Retrowalk CC  -- --    BOSU balance  1'x2 1' 1'   BOSU marches  1' -- x30   Stool push SL    15'x3 ea   Stool pull DL    15'x2                                        Manual (62615): Therapeutic Exercise and NMR EXR  [x] (60057) Provided verbal/tactile cueing for activities related to strengthening, flexibility, endurance, ROM for improvements in LE, proximal hip, and core control with self care, mobility, lifting, ambulation. [x] (47057) Provided verbal/tactile cueing for activities related to improving balance, coordination, kinesthetic sense, posture, motor skill, proprioception to assist with LE, proximal hip, and core control in self-care, mobility, lifting, ambulation and eccentric single leg control.      NMR and Therapeutic Activities:    [x] (48568 or 27099) Provided verbal/tactile cueing for activities related to improving balance, coordination, kinesthetic sense, posture, motor skill, proprioception and motor activation to allow for proper function of core, proximal hip and LE with self-care and ADLs and functional mobility.   [] (85522) Gait Re-education- Provided training and instruction to the patient for proper LE, core and proximal hip recruitment and positioning and eccentric body weight control with ambulation re-education including up and down stairs     Home Exercise Program:    [x] (31292) Reviewed/Progressed HEP activities related to strengthening, flexibility, endurance, ROM of core, proximal hip and LE for functional self-care, mobility, lifting and ambulation/stair navigation   [] (42364) Reviewed/Progressed HEP activities related to improving balance, coordination, kinesthetic sense, posture, motor skill, proprioception of core, proximal hip and LE for self-care, mobility, lifting, and ambulation/stair navigation      Manual Treatments:  PROM / STM / Oscillations-Mobs:  G-I, II, III, IV (PA's, Inf., Post.)  [x] (42188) Provided manual therapy to mobilize LE, proximal hip and/or LS spine soft tissue/joints for the purpose of modulating pain, promoting relaxation, increasing ROM, reducing/eliminating soft tissue swelling/inflammation/restriction, improving soft tissue extensibility and allowing for proper ROM for normal function with self-care, mobility, lifting and ambulation. Modalities:     [x] GAME READY (VASO)- for significant edema, swelling, pain control. Right LE, no adverse skin reaction      Charges:  Timed Code Treatment Minutes: 45   Total Treatment Minutes:  45   BWC:  TE TIME:  NMR TIME:  MANUAL TIME:   TA  UNTIMED MINUTES:  Medicare Total:   25  10    10          [] EVAL (LOW) 74247 (typically 20 minutes face-to-face)  [] EVAL (MOD) 81507 (typically 30 minutes face-to-face)  [] EVAL (HIGH) 72758 (typically 45 minutes face-to-face)  [] RE-EVAL     [x] BY(94258) 1     [] IONTO  [x] NMR (89696) 1     [] VASO  [] Manual (93253) x     [] Dry Needle:  [x] TA 1      [] [facility] (23530)  [] ES(attended) (13410)      [] ES (un) (35243):        GOALS:     Patient stated goal: Improve mobility, return to work      Therapist goals for Patient:   Short Term Goals: To be achieved in: 2 weeks  1.  Independent in HEP and progression per patient tolerance, in order to prevent re-injury. []? Progressing: []? Met: []? Not Met: []? Adjusted      2. Patient will have a decrease in pain of NRPS to </=3/10 facilitate improvement in movement, function, and ADLs as indicated by Functional Deficits. []? Progressing: []? Met: []? Not Met: []? Adjusted      Long Term Goals: To be achieved in: 8 weeks  1. Disability index score of 30% or less for the LEFS to assist with reaching prior level of function. []? Progressing: []? Met: []? Not Met: []? Adjusted      2. Patient will demonstrate increased AROM to 0-130 to bilateral knees to allow for proper joint functioning as indicated by patients Functional Deficits. []? Progressing: []? Met: []? Not Met: []? Adjusted      3. Patient will demonstrate an increase in Strength to 5/5 to knee flex/ext allow for proper functional mobility as indicated by patients Functional Deficits. []? Progressing: []? Met: []? Not Met: []? Adjusted      4. Patient will return to functional walking activities with NRPS of </= 1/10. []? Progressing: []? Met: []? Not Met: []? Adjusted      5. Pt will be able to ascend/descend ladder with deviation to allow for return to work activities.  (patient specific functional goal)    []? Progressing: []? Met: []? Not Met: []? Adjusted                ASSESSMENT:  Pt joycelyn session fair. Patient received education on their current pathology and how their condition effects them with their functional activities. Patient understood discussion and questions were answered. Patient understands their activity limitations and understands rational for treatment progression. Pt educated on plan of care and HEP, if worsening symptoms to d/c that exercise.                PLAN: See eval  [x] Continue per plan of care [] Alter current plan (see comments above)  [] Plan of care initiated [] Hold pending MD visit [] Discharge      Electronically signed by:  Tameka Cifuentes PT    Note: If patient does not return for scheduled/ recommended follow up visits, this note will serve as a discharge from care along with most recent update on progress.

## 2022-02-10 ENCOUNTER — HOSPITAL ENCOUNTER (OUTPATIENT)
Dept: PHYSICAL THERAPY | Age: 37
Setting detail: THERAPIES SERIES
Discharge: HOME | End: 2022-02-10
Payer: MEDICAID

## 2022-02-10 PROCEDURE — 97110 THERAPEUTIC EXERCISES: CPT

## 2022-02-10 PROCEDURE — 97112 NEUROMUSCULAR REEDUCATION: CPT

## 2022-02-10 PROCEDURE — 97530 THERAPEUTIC ACTIVITIES: CPT

## 2022-02-10 NOTE — FLOWSHEET NOTE
[facility], [address] [place] [address] [address]  Phone: [phone]   Fax:     [phone]      Physical Therapy Treatment Note/ Progress Report:           Date:  2/10/2022    Patient Name:  Brent Jordan    :  1985  MRN: [id number]  Restrictions/Precautions:    Medical/Treatment Diagnosis Information:  · Diagnosis: M22.41, M22.42 (ICD-10-CM) - Chondromalacia of both patellae; S83.282D (ICD-10-CM) - Acute lateral meniscus tear of left knee, subsequent encounter  · Treatment Diagnosis: Bilateral knee pain, decreased ROM, flexibility, strength  Insurance/Certification information:  PT Insurance Information: Joan Vargas  Physician Information:    Has the plan of care been signed (Y/N):        []  Yes  []  No     Date of Patient follow up with Physician:     Assessment Summary: Nohemi Gavin is a 39 y.o. male reporting to OP PT with c/c of bilateral knee pain and knee scopes bilaterally, left on 21 and right on 21. Pt is noted to have reduced ROM, flexibility and strength. Overall reduced functional mobility. Will progress as tolerated. Is this a Progress Report:     []  Yes  [x]  No        If Yes:  Date Range for reporting period:  Beginning   22  Ending 22    Progress report will be due (10 Rx or 30 days whichever is less): 26       Recertification will be due (POC Duration  / 90 days whichever is less): 3/4/22          Visit # Insurance Allowable Auth Required   In Person  30 []  Yes     []  No    Tele Health   []  Yes     []  No    Total 8             Functional Scale: LEFS 60%    Date assessed:        Latex Allergy:  [x]NO      []YES  Preferred Language for Healthcare:   [x]English       []other:      Pain level:  5/10     SUBJECTIVE:  Pt states knees feels a little better today. OBJECTIVE:   No edema, erythema, no calf tenderness.        RESTRICTIONS/PRECAUTIONS: None     Exercises/Interventions: HEP code: [address]  Therapeutic Ex (90528) HEP 1/28/22 1/31/22 2/10/22   Warm-up       Rec bike  7', Lv 5 7' 7', Lv 3          TABLE       Heel slides x      Strap gastroc stretch x      Hs stretch x      Quad set       SLR x 10x2 B     Bridge x 10x3            SEATED                                                 STANDING       Wedge gastroc stretch  1' B 1'  1'   Heel raises 1/2 roll  10x3 10x3 10x3   Toe raises 1/2 roll  10x3 10x3 10x3   HS stretch  1' B 1' 1'   Anterior step ups  x20 B, 4\" x20 B, 4\" x20 B, 4\"   Anterior step downs       Lateral step ups  x20 B, 4\" x20 B, 4\" x20 B, 4\"   Resisted side steps   -- 10'x3 laps, OTB   Knee extension machine  -- --    HS curl machine  10x2, 40# -- X30, 30#   COREY Abduction   x15 B, 45# x20 B, 50#   [facility] Extension   x15 B, 45# x20 B, 50#   Leg Press DL   10x3, 60#    Retrowalk CC  -- --    BOSU balance  1'x2 1' 1'   BOSU marches  1' -- x30   Stool push SL    15'x3 ea   Stool pull DL    15'x2                                         Manual (55760): Prone quad stretch 1' B    Therapeutic Exercise and NMR EXR  [x] (31241) Provided verbal/tactile cueing for activities related to strengthening, flexibility, endurance, ROM for improvements in LE, proximal hip, and core control with self care, mobility, lifting, ambulation. [x] (61389) Provided verbal/tactile cueing for activities related to improving balance, coordination, kinesthetic sense, posture, motor skill, proprioception to assist with LE, proximal hip, and core control in self-care, mobility, lifting, ambulation and eccentric single leg control.      NMR and Therapeutic Activities:    [x] (92094 or 26590) Provided verbal/tactile cueing for activities related to improving balance, coordination, kinesthetic sense, posture, motor skill, proprioception and motor activation to allow for proper function of core, proximal hip and LE with self-care and ADLs and functional mobility.   [] (55216) Gait Re-education- Provided training and instruction to the patient for proper LE, core and proximal hip recruitment and positioning and eccentric body weight control with ambulation re-education including up and down stairs     Home Exercise Program:    [x] (68031) Reviewed/Progressed HEP activities related to strengthening, flexibility, endurance, ROM of core, proximal hip and LE for functional self-care, mobility, lifting and ambulation/stair navigation   [] (25498) Reviewed/Progressed HEP activities related to improving balance, coordination, kinesthetic sense, posture, motor skill, proprioception of core, proximal hip and LE for self-care, mobility, lifting, and ambulation/stair navigation      Manual Treatments:  PROM / STM / Oscillations-Mobs:  G-I, II, III, IV (PA's, Inf., Post.)  [x] (60758) Provided manual therapy to mobilize LE, proximal hip and/or LS spine soft tissue/joints for the purpose of modulating pain, promoting relaxation, increasing ROM, reducing/eliminating soft tissue swelling/inflammation/restriction, improving soft tissue extensibility and allowing for proper ROM for normal function with self-care, mobility, lifting and ambulation. Modalities:     [x] GAME READY (VASO)- for significant edema, swelling, pain control.   Right LE, no adverse skin reaction      Charges:  Timed Code Treatment Minutes: 53   Total Treatment Minutes:  53   BWC:  TE TIME:  NMR TIME:  MANUAL TIME:   TA  UNTIMED MINUTES:  Medicare Total:   25  13    15          [] EVAL (LOW) 92206 (typically 20 minutes face-to-face)  [] EVAL (MOD) 83657 (typically 30 minutes face-to-face)  [] EVAL (HIGH) 54332 (typically 45 minutes face-to-face)  [] RE-EVAL     [x] WG(60991) 2     [] IONTO  [x] NMR (79829) 1     [] VASO  [] Manual (19615) x     [] Dry Needle:  [x] TA 1      [] Mech Traction (38722)  [] ES(attended) (90247)      [] ES (un) (36659):        GOALS:     Patient stated goal: Improve mobility, return to work      Therapist goals for Patient:   Short Term Goals: To be achieved in: 2 weeks  1. Independent in HEP and progression per patient tolerance, in order to prevent re-injury. []? Progressing: []? Met: []? Not Met: []? Adjusted      2. Patient will have a decrease in pain of NRPS to </=3/10 facilitate improvement in movement, function, and ADLs as indicated by Functional Deficits. []? Progressing: []? Met: []? Not Met: []? Adjusted      Long Term Goals: To be achieved in: 8 weeks  1. Disability index score of 30% or less for the LEFS to assist with reaching prior level of function. []? Progressing: []? Met: []? Not Met: []? Adjusted      2. Patient will demonstrate increased AROM to 0-130 to bilateral knees to allow for proper joint functioning as indicated by patients Functional Deficits. []? Progressing: []? Met: []? Not Met: []? Adjusted      3. Patient will demonstrate an increase in Strength to 5/5 to knee flex/ext allow for proper functional mobility as indicated by patients Functional Deficits. []? Progressing: []? Met: []? Not Met: []? Adjusted      4. Patient will return to functional walking activities with NRPS of </= 1/10. []? Progressing: []? Met: []? Not Met: []? Adjusted      5. Pt will be able to ascend/descend ladder with deviation to allow for return to work activities.  (patient specific functional goal)    []? Progressing: []? Met: []? Not Met: []? Adjusted                ASSESSMENT:  Pt joycelyn session well. Able to increase activity. Tolerating prone quad stretch better. Patient received education on their current pathology and how their condition effects them with their functional activities. Patient understood discussion and questions were answered. Patient understands their activity limitations and understands rational for treatment progression. Pt educated on plan of care and HEP, if worsening symptoms to d/c that exercise.                PLAN: See eval  [x] Continue per plan of care [] Alter current plan (see comments above)  [] Plan of care initiated [] Hold pending MD visit [] Discharge      Electronically signed by:  Blaine Arteaga PT    Note: If patient does not return for scheduled/ recommended follow up visits, this note will serve as a discharge from care along with most recent update on progress.

## 2022-02-14 ENCOUNTER — HOSPITAL ENCOUNTER (OUTPATIENT)
Dept: PHYSICAL THERAPY | Age: 37
Setting detail: THERAPIES SERIES
Discharge: HOME | End: 2022-02-14
Payer: MEDICAID

## 2022-02-14 PROCEDURE — 97112 NEUROMUSCULAR REEDUCATION: CPT

## 2022-02-14 PROCEDURE — 97110 THERAPEUTIC EXERCISES: CPT

## 2022-02-14 NOTE — FLOWSHEET NOTE
[address] [facility], [address] [place] [address] [address]  Phone: [phone]   Fax:     [phone]      Physical Therapy Treatment Note/ Progress Report:           Date:  2022    Patient Name:  Albert Dawn    :  1985  MRN: [id number]  Restrictions/Precautions:    Medical/Treatment Diagnosis Information:  · Diagnosis: M22.41, M22.42 (ICD-10-CM) - Chondromalacia of both patellae; S83.282D (ICD-10-CM) - Acute lateral meniscus tear of left knee, subsequent encounter  · Treatment Diagnosis: Bilateral knee pain, decreased ROM, flexibility, strength  Insurance/Certification information:  PT Insurance Information: Henrine Hashimoto  Physician Information:    Has the plan of care been signed (Y/N):        []  Yes  []  No     Date of Patient follow up with Physician:     Assessment Summary: Noel Ruiz is a 39 y.o. male reporting to OP PT with c/c of bilateral knee pain and knee scopes bilaterally, left on 21 and right on 21. Pt is noted to have reduced ROM, flexibility and strength. Overall reduced functional mobility. Will progress as tolerated. Is this a Progress Report:     []  Yes  [x]  No        If Yes:  Date Range for reporting period:  Beginning   22  Ending 22    Progress report will be due (10 Rx or 30 days whichever is less): 42       Recertification will be due (POC Duration  / 90 days whichever is less): 3/4/22          Visit # Insurance Allowable Auth Required   In Person  30 []  Yes     []  No    Tele Health   []  Yes     []  No    Total 9             Functional Scale: LEFS 60%    Date assessed:        Latex Allergy:  [x]NO      []YES  Preferred Language for Healthcare:   [x]English       []other:      Pain level:  5/10     SUBJECTIVE:  Pt states knee are feeling a little better. OBJECTIVE:   No edema, erythema, no calf tenderness.        RESTRICTIONS/PRECAUTIONS: None     Exercises/Interventions: HEP code: [address] [facility] [address] [facility] [address]  Therapeutic Ex (49422) HEP 1/28/22 1/31/22 2/10/22 2/14/22   Warm-up        Rec bike  7', Lv 5 7' 7', Lv 3 7', Lv 3           TABLE        Heel slides x       Strap gastroc stretch x       Hs stretch x       Quad set        SLR x 10x2 B      Bridge x 10x3              SEATED                                                        STANDING        Wedge gastroc stretch  1' B 1'  1' 1'   Heel raises 1/2 roll  10x3 10x3 10x3 10x3   Toe raises 1/2 roll  10x3 10x3 10x3 10x3   HS stretch  1' B 1' 1' 1' B   Anterior step ups  x20 B, 4\" x20 B, 4\" x20 B, 4\" --   Anterior step downs     x20 B, 4\"   Lateral step ups  x20 B, 4\" x20 B, 4\" x20 B, 4\" x20 B, 4\"   Resisted side steps   -- 10'x3 laps, OTB --   Knee extension machine  -- --     HS curl machine  10x2, 40# -- X30, 30#    COREY Abduction   x15 B, 45# x20 B, 50# 10x2 B, 60#   COREY Extension   x15 B, 45# x20 B, 50# 10x2 B, 60#   COREY Flexion     10x2 B, 45#   Leg Press DL   10x3, 60#     Retrowalk CC  -- --     BOSU balance  1'x2 1' 1' 1'   BOSU marches  1' -- x30 x30   Stool push SL    15'x3 ea 15'x3 ea   Stool pull DL    15'x2 15'x4                                              Manual (98308): Therapeutic Exercise and NMR EXR  [x] (74074) Provided verbal/tactile cueing for activities related to strengthening, flexibility, endurance, ROM for improvements in LE, proximal hip, and core control with self care, mobility, lifting, ambulation. [x] (45591) Provided verbal/tactile cueing for activities related to improving balance, coordination, kinesthetic sense, posture, motor skill, proprioception to assist with LE, proximal hip, and core control in self-care, mobility, lifting, ambulation and eccentric single leg control.      NMR and Therapeutic Activities:    [x] (97545 or 46582) Provided verbal/tactile cueing for activities related to improving balance, coordination, kinesthetic sense, posture, motor skill, proprioception and motor activation to allow for proper function of core, proximal hip and LE with self-care and ADLs and functional mobility.   [] (69009) Gait Re-education- Provided training and instruction to the patient for proper LE, core and proximal hip recruitment and positioning and eccentric body weight control with ambulation re-education including up and down stairs     Home Exercise Program:    [x] (06506) Reviewed/Progressed HEP activities related to strengthening, flexibility, endurance, ROM of core, proximal hip and LE for functional self-care, mobility, lifting and ambulation/stair navigation   [] (20254) Reviewed/Progressed HEP activities related to improving balance, coordination, kinesthetic sense, posture, motor skill, proprioception of core, proximal hip and LE for self-care, mobility, lifting, and ambulation/stair navigation      Manual Treatments:  PROM / STM / Oscillations-Mobs:  G-I, II, III, IV (PA's, Inf., Post.)  [x] (05060) Provided manual therapy to mobilize LE, proximal hip and/or LS spine soft tissue/joints for the purpose of modulating pain, promoting relaxation, increasing ROM, reducing/eliminating soft tissue swelling/inflammation/restriction, improving soft tissue extensibility and allowing for proper ROM for normal function with self-care, mobility, lifting and ambulation. Modalities:     [x] GAME READY (VASO)- for significant edema, swelling, pain control.   Right LE, no adverse skin reaction      Charges:  Timed Code Treatment Minutes: 45   Total Treatment Minutes:  45   BWC:  TE TIME:  NMR TIME:  MANUAL TIME:   TA  UNTIMED MINUTES:  Medicare Total:   30  15              [] EVAL (LOW) 50946 (typically 20 minutes face-to-face)  [] EVAL (MOD) 15893 (typically 30 minutes face-to-face)  [] EVAL (HIGH) 48808 (typically 45 minutes face-to-face)  [] RE-EVAL     [x] VS(12359) 2     [] IONTO  [x] NMR (74333) 1     [] VASO  [] Manual (36318) x     [] Dry Needle:  [] TA 1      [] [facility] (40578)  [] ES(attended) (24671) [] ES (un) (88201):        GOALS:     Patient stated goal: Improve mobility, return to work      Therapist goals for Patient:   Short Term Goals: To be achieved in: 2 weeks  1. Independent in HEP and progression per patient tolerance, in order to prevent re-injury. []? Progressing: []? Met: []? Not Met: []? Adjusted      2. Patient will have a decrease in pain of NRPS to </=3/10 facilitate improvement in movement, function, and ADLs as indicated by Functional Deficits. []? Progressing: []? Met: []? Not Met: []? Adjusted      Long Term Goals: To be achieved in: 8 weeks  1. Disability index score of 30% or less for the LEFS to assist with reaching prior level of function. []? Progressing: []? Met: []? Not Met: []? Adjusted      2. Patient will demonstrate increased AROM to 0-130 to bilateral knees to allow for proper joint functioning as indicated by patients Functional Deficits. []? Progressing: []? Met: []? Not Met: []? Adjusted      3. Patient will demonstrate an increase in Strength to 5/5 to knee flex/ext allow for proper functional mobility as indicated by patients Functional Deficits. []? Progressing: []? Met: []? Not Met: []? Adjusted      4. Patient will return to functional walking activities with NRPS of </= 1/10. []? Progressing: []? Met: []? Not Met: []? Adjusted      5. Pt will be able to ascend/descend ladder with deviation to allow for return to work activities.  (patient specific functional goal)    []? Progressing: []? Met: []? Not Met: []? Adjusted                ASSESSMENT:  Pt joycelyn session well. Patient received education on their current pathology and how their condition effects them with their functional activities. Patient understood discussion and questions were answered. Patient understands their activity limitations and understands rational for treatment progression. Pt educated on plan of care and HEP, if worsening symptoms to d/c that exercise. PLAN: See eval  [x] Continue per plan of care [] Alter current plan (see comments above)  [] Plan of care initiated [] Hold pending MD visit [] Discharge      Electronically signed by:  Sandip Arenas PT    Note: If patient does not return for scheduled/ recommended follow up visits, this note will serve as a discharge from care along with most recent update on progress.

## 2022-02-17 ENCOUNTER — HOSPITAL ENCOUNTER (OUTPATIENT)
Dept: PHYSICAL THERAPY | Age: 37
Setting detail: THERAPIES SERIES
Discharge: HOME | End: 2022-02-17
Payer: MEDICAID

## 2022-02-17 PROCEDURE — 97016 VASOPNEUMATIC DEVICE THERAPY: CPT

## 2022-02-17 PROCEDURE — 97112 NEUROMUSCULAR REEDUCATION: CPT

## 2022-02-17 PROCEDURE — 97110 THERAPEUTIC EXERCISES: CPT

## 2022-02-17 NOTE — FLOWSHEET NOTE
[facility], [address] [place] [address] [address]  Phone: [phone]   Fax:     [phone]      Physical Therapy Treatment Note/ Progress Report:           Date:  2022    Patient Name:  Jeniffer Fink    :  1985  MRN: [id number]  Restrictions/Precautions:    Medical/Treatment Diagnosis Information:  · Diagnosis: M22.41, M22.42 (ICD-10-CM) - Chondromalacia of both patellae; S83.282D (ICD-10-CM) - Acute lateral meniscus tear of left knee, subsequent encounter  · Treatment Diagnosis: Bilateral knee pain, decreased ROM, flexibility, strength  Insurance/Certification information:  PT Insurance Information: [facility]  Physician Information:    Has the plan of care been signed (Y/N):        []  Yes  []  No     Date of Patient follow up with Physician:     Assessment Summary: Andrei Fraser is a 39 y.o. male reporting to OP PT with c/c of bilateral knee pain and knee scopes bilaterally, left on 21 and right on 21. Pt is noted to have reduced ROM, flexibility and strength. Overall reduced functional mobility. Will progress as tolerated. Is this a Progress Report:     []  Yes  [x]  No        If Yes:  Date Range for reporting period:  Beginning   22  Ending 22    Progress report will be due (10 Rx or 30 days whichever is less): 6/3/52       Recertification will be due (POC Duration  / 90 days whichever is less): 3/4/22          Visit # Insurance Allowable Auth Required   In Person  30 []  Yes     []  No    Tele Health   []  Yes     []  No    Total 10             Functional Scale: LEFS 60%    Date assessed:        Latex Allergy:  [x]NO      []YES  Preferred Language for Healthcare:   [x]English       []other:      Pain level:  10     SUBJECTIVE:  Pt states pain doesn't really get above 5 now. OBJECTIVE:   No edema, erythema, no calf tenderness.        RESTRICTIONS/PRECAUTIONS: None     Exercises/Interventions: [facility] code: [address] [facility] [address]  Therapeutic Ex (82309) HEP 1/31/22 2/10/22 2/17/22   Warm-up       Rec bike  7' 7', Lv 3 7', Lv 3          TABLE       Heel slides x      Strap gastroc stretch x      Hs stretch x      Quad set       SLR x      Bridge x      Melvin stretch x   1' B          SEATED                                                 STANDING       Wedge gastroc stretch  1' 1' 1'   Heel raises 1/2 roll  10x3 10x3 10x3   Toe raises 1/2 roll  10x3 10x3 10x3   HS stretch  1' 1' 1' B   Anterior step ups  x20 B, 4\" x20 B, 4\" --   Anterior step downs    --   Lateral step ups  x20 B, 4\" x20 B, 4\" x20 B, 4\"   Resisted side steps  -- 10'x3 laps, OTB --   Knee extension machine  --     HS curl machine  -- X30, 30#    COREY Abduction  x15 B, 45# x20 B, 50# 10x2 B, 50#   COREY Extension  x15 B, 45# x20 B, 50# 10x2 B, 50#   CROEY Flexion    10x2 B, 50#   Leg Press DL  10x3, 60#     Leg Press Eccentric    x20 B   Retrowalk CC  --     BOSU balance  1' 1' 1'   BOSU marches  -- x30 x30   Stool push SL   15'x3 ea 15'x2 ea   Stool pull DL   15'x2 15'x2                                         Manual (67509): Therapeutic Exercise and NMR EXR  [x] (45992) Provided verbal/tactile cueing for activities related to strengthening, flexibility, endurance, ROM for improvements in LE, proximal hip, and core control with self care, mobility, lifting, ambulation. [x] (62560) Provided verbal/tactile cueing for activities related to improving balance, coordination, kinesthetic sense, posture, motor skill, proprioception to assist with LE, proximal hip, and core control in self-care, mobility, lifting, ambulation and eccentric single leg control.      NMR and Therapeutic Activities:    [x] (68534 or 03309) Provided verbal/tactile cueing for activities related to improving balance, coordination, kinesthetic sense, posture, motor skill, proprioception and motor activation to allow for proper function of core, proximal hip and LE with self-care and ADLs and mobility, return to work      Therapist goals for Patient:   Short Term Goals: To be achieved in: 2 weeks  1. Independent in HEP and progression per patient tolerance, in order to prevent re-injury. []? Progressing: []? Met: []? Not Met: []? Adjusted      2. Patient will have a decrease in pain of NRPS to </=3/10 facilitate improvement in movement, function, and ADLs as indicated by Functional Deficits. []? Progressing: []? Met: []? Not Met: []? Adjusted      Long Term Goals: To be achieved in: 8 weeks  1. Disability index score of 30% or less for the LEFS to assist with reaching prior level of function. []? Progressing: []? Met: []? Not Met: []? Adjusted      2. Patient will demonstrate increased AROM to 0-130 to bilateral knees to allow for proper joint functioning as indicated by patients Functional Deficits. []? Progressing: []? Met: []? Not Met: []? Adjusted      3. Patient will demonstrate an increase in Strength to 5/5 to knee flex/ext allow for proper functional mobility as indicated by patients Functional Deficits. []? Progressing: []? Met: []? Not Met: []? Adjusted      4. Patient will return to functional walking activities with NRPS of </= 1/10. []? Progressing: []? Met: []? Not Met: []? Adjusted      5. Pt will be able to ascend/descend ladder with deviation to allow for return to work activities.  (patient specific functional goal)    []? Progressing: []? Met: []? Not Met: []? Adjusted                ASSESSMENT:  Pt joycelyn session well. Addition of hip flexor stretches to improve LE mobility and eccentrics. Patient received education on their current pathology and how their condition effects them with their functional activities. Patient understood discussion and questions were answered. Patient understands their activity limitations and understands rational for treatment progression. Pt educated on plan of care and HEP, if worsening symptoms to d/c that exercise. PLAN: See eval  [x] Continue per plan of care [] Alter current plan (see comments above)  [] Plan of care initiated [] Hold pending MD visit [] Discharge      Electronically signed by:  Blaine Arteaga PT    Note: If patient does not return for scheduled/ recommended follow up visits, this note will serve as a discharge from care along with most recent update on progress.

## 2022-02-21 ENCOUNTER — HOSPITAL ENCOUNTER (OUTPATIENT)
Dept: PHYSICAL THERAPY | Age: 37
Setting detail: THERAPIES SERIES
Discharge: HOME | End: 2022-02-21
Payer: MEDICAID

## 2022-02-21 PROCEDURE — 97016 VASOPNEUMATIC DEVICE THERAPY: CPT

## 2022-02-21 PROCEDURE — 97112 NEUROMUSCULAR REEDUCATION: CPT

## 2022-02-21 PROCEDURE — 97110 THERAPEUTIC EXERCISES: CPT

## 2022-02-21 NOTE — FLOWSHEET NOTE
[facility], [address] [place] [address] [address]  Phone: [phone]   Fax:     [phone]      Physical Therapy Treatment Note/ Progress Report:           Date:  2022    Patient Name:  Jennifer Augustin    :  1985  MRN: [id number]  Restrictions/Precautions:    Medical/Treatment Diagnosis Information:  · Diagnosis: M22.41, M22.42 (ICD-10-CM) - Chondromalacia of both patellae; S83.282D (ICD-10-CM) - Acute lateral meniscus tear of left knee, subsequent encounter  · Treatment Diagnosis: Bilateral knee pain, decreased ROM, flexibility, strength  Insurance/Certification information:  PT Insurance Information: [place]  Physician Information:    Has the plan of care been signed (Y/N):        []  Yes  []  No     Date of Patient follow up with Physician:     Assessment Summary: Luzma Razo is a 39 y.o. male reporting to OP PT with c/c of bilateral knee pain and knee scopes bilaterally, left on 21 and right on 21. Pt is noted to have reduced ROM, flexibility and strength. Overall reduced functional mobility. Will progress as tolerated. Is this a Progress Report:     []  Yes  [x]  No        If Yes:  Date Range for reporting period:  Beginning   22  Ending 22    Progress report will be due (10 Rx or 30 days whichever is less):        Recertification will be due (POC Duration  / 90 days whichever is less): 3/4/22          Visit # Insurance Allowable Auth Required   In Person  30 []  Yes     []  No    Tele Health   []  Yes     []  No    Total 11             Functional Scale: LEFS 60%, 49%    Date assessed:        Latex Allergy:  [x]NO      []YES  Preferred Language for Healthcare:   [x]English       []other:      Pain level:  6/10     SUBJECTIVE:  Pt states knees are sore today. OBJECTIVE:   No edema, erythema, no calf tenderness.        RESTRICTIONS/PRECAUTIONS: None     Exercises/Interventions: HEP code: [id number]  Therapeutic Ex (26708) HEP 2/10/22 2/17/22 2/21/22   Warm-up       Rec bike  7', Lv 3 7', Lv 3 7', Lv 4          TABLE       Heel slides x      Strap gastroc stretch x      Hs stretch x      Quad set       SLR x      Bridge x   x10   Bridge w/ march    x15 B   Melvin stretch x  1' B 1' B          SEATED                                                 STANDING       Wedge gastroc stretch  1' 1' 1'   Heel raises 1/2 roll  10x3 10x3 10x3   Toe raises 1/2 roll  10x3 10x3 10x3   HS stretch  1' 1' B 1' B   Anterior step ups  x20 B, 4\" --    Anterior step downs   --    Lateral step ups  x20 B, 4\" x20 B, 4\"    Resisted side steps  10'x3 laps, OTB -- 10'x3 laps,    Knee extension machine       HS curl machine  X30, 30#     COREY Abduction  x20 B, 50# 10x2 B, 50# 10x2 B, 50#   COREY Extension  x20 B, 50# 10x2 B, 50# 10x2 B, 50#   COREY Flexion   10x2 B, 50# 10x2 B, 50#   Leg Press DL    X15, 85#   Leg Press Eccentric   x20 B X15, 50#   Retrowalk CC       BOSU balance  1' 1'    BOSU marches  x30 x30    Stool push SL  15'x3 ea 15'x2 ea    Stool pull DL  15'x2 15'x2                                          Manual (72895): Therapeutic Exercise and NMR EXR  [x] (98119) Provided verbal/tactile cueing for activities related to strengthening, flexibility, endurance, ROM for improvements in LE, proximal hip, and core control with self care, mobility, lifting, ambulation. [x] (00643) Provided verbal/tactile cueing for activities related to improving balance, coordination, kinesthetic sense, posture, motor skill, proprioception to assist with LE, proximal hip, and core control in self-care, mobility, lifting, ambulation and eccentric single leg control.      NMR and Therapeutic Activities:    [x] (39571 or 23898) Provided verbal/tactile cueing for activities related to improving balance, coordination, kinesthetic sense, posture, motor skill, proprioception and motor activation to allow for proper function of core, proximal hip and LE with self-care and ADLs and functional mobility.   [] (63183) Gait Re-education- Provided training and instruction to the patient for proper LE, core and proximal hip recruitment and positioning and eccentric body weight control with ambulation re-education including up and down stairs     Home Exercise Program:    [x] (08119) Reviewed/Progressed HEP activities related to strengthening, flexibility, endurance, ROM of core, proximal hip and LE for functional self-care, mobility, lifting and ambulation/stair navigation   [] (15197) Reviewed/Progressed HEP activities related to improving balance, coordination, kinesthetic sense, posture, motor skill, proprioception of core, proximal hip and LE for self-care, mobility, lifting, and ambulation/stair navigation      Manual Treatments:  PROM / STM / Oscillations-Mobs:  G-I, II, III, IV (PA's, Inf., Post.)  [x] (49761) Provided manual therapy to mobilize LE, proximal hip and/or LS spine soft tissue/joints for the purpose of modulating pain, promoting relaxation, increasing ROM, reducing/eliminating soft tissue swelling/inflammation/restriction, improving soft tissue extensibility and allowing for proper ROM for normal function with self-care, mobility, lifting and ambulation. Modalities:     [x] GAME READY (VASO)- for significant edema, swelling, pain control.   Right LE, no adverse skin reaction      Charges:  Timed Code Treatment Minutes: 53   Total Treatment Minutes:  63   BWC:  TE TIME:  NMR TIME:  MANUAL TIME:   TA  UNTIMED MINUTES:  Medicare Total:   40  13      10        [] EVAL (LOW) 02350 (typically 20 minutes face-to-face)  [] EVAL (MOD) 48035 (typically 30 minutes face-to-face)  [] EVAL (HIGH) 22241 (typically 45 minutes face-to-face)  [] RE-EVAL     [x] HU(93352) 3     [] IONTO  [x] NMR (40131) 1     [x] VASO  [] Manual (33469) x     [] Dry Needle:  [] TA 1      [] Mech Traction (38870)  [] ES(attended) (30478)      [] ES (un) (19669):        GOALS: Patient stated goal: Improve mobility, return to work      Therapist goals for Patient:   Short Term Goals: To be achieved in: 2 weeks  1. Independent in HEP and progression per patient tolerance, in order to prevent re-injury. [x]? Progressing: []? Met: []? Not Met: []? Adjusted      2. Patient will have a decrease in pain of NRPS to </=3/10 facilitate improvement in movement, function, and ADLs as indicated by Functional Deficits. [x]? Progressing: []? Met: []? Not Met: []? Adjusted      Long Term Goals: To be achieved in: 8 weeks  1. Disability index score of 30% or less for the LEFS to assist with reaching prior level of function. [x]? Progressing: []? Met: []? Not Met: []? Adjusted      2. Patient will demonstrate increased AROM to 0-130 to bilateral knees to allow for proper joint functioning as indicated by patients Functional Deficits. []? Progressing: []? Met: []? Not Met: []? Adjusted      3. Patient will demonstrate an increase in Strength to 5/5 to knee flex/ext allow for proper functional mobility as indicated by patients Functional Deficits. []? Progressing: []? Met: []? Not Met: []? Adjusted      4. Patient will return to functional walking activities with NRPS of </= 1/10. []? Progressing: []? Met: []? Not Met: []? Adjusted      5. Pt will be able to ascend/descend ladder with deviation to allow for return to work activities.  (patient specific functional goal)    []? Progressing: []? Met: []? Not Met: []? Adjusted                ASSESSMENT:  Pt joycelyn session well. Difficulty with 85# leg press. Patient received education on their current pathology and how their condition effects them with their functional activities. Patient understood discussion and questions were answered. Patient understands their activity limitations and understands rational for treatment progression. Pt educated on plan of care and HEP, if worsening symptoms to d/c that exercise.                PLAN: See eval  [x] Continue per plan of care [] Alter current plan (see comments above)  [] Plan of care initiated [] Hold pending MD visit [] Discharge      Electronically signed by:  Giuliana Pastor PT    Note: If patient does not return for scheduled/ recommended follow up visits, this note will serve as a discharge from care along with most recent update on progress.

## 2022-02-22 ENCOUNTER — OFFICE VISIT (OUTPATIENT)
Dept: ORTHOPEDIC SURGERY | Age: 37
End: 2022-02-22

## 2022-02-22 VITALS — WEIGHT: 233 LBS | BODY MASS INDEX: 33.36 KG/M2 | HEIGHT: 70 IN

## 2022-02-22 DIAGNOSIS — M22.41 CHONDROMALACIA OF BOTH PATELLAE: ICD-10-CM

## 2022-02-22 DIAGNOSIS — M22.41 CHONDROMALACIA OF PATELLA, RIGHT: Primary | ICD-10-CM

## 2022-02-22 DIAGNOSIS — M22.42 CHONDROMALACIA OF BOTH PATELLAE: ICD-10-CM

## 2022-02-22 PROCEDURE — 99024 POSTOP FOLLOW-UP VISIT: CPT | Performed by: ORTHOPAEDIC SURGERY

## 2022-02-22 RX ORDER — TIZANIDINE 4 MG/1
4 TABLET ORAL PRN
Qty: 90 TABLET | Refills: 0 | Status: SHIPPED | OUTPATIENT
Start: 2022-02-22

## 2022-02-22 NOTE — PROGRESS NOTES
He continues to progress in therapy. 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 He also is requesting muscle relaxer which is reasonable. He is doing therapy 3 times a week but would recommend that be continued and actually consider work hardening if it something that is possible to arrange.

## 2022-02-24 ENCOUNTER — HOSPITAL ENCOUNTER (OUTPATIENT)
Dept: PHYSICAL THERAPY | Age: 37
Setting detail: THERAPIES SERIES
Discharge: HOME | End: 2022-02-24
Payer: MEDICAID

## 2022-02-24 PROCEDURE — 97110 THERAPEUTIC EXERCISES: CPT

## 2022-02-24 PROCEDURE — 97112 NEUROMUSCULAR REEDUCATION: CPT

## 2022-02-24 NOTE — FLOWSHEET NOTE
[facility], [address] [place] [address] [address]  Phone: [phone]   Fax:     [phone]      Physical Therapy Treatment Note/ Progress Report:           Date:  2022    Patient Name:  Adrianna Hammans    :  1985  MRN: [id number]  Restrictions/Precautions:    Medical/Treatment Diagnosis Information:  · Diagnosis: M22.41, M22.42 (ICD-10-CM) - Chondromalacia of both patellae; S83.282D (ICD-10-CM) - Acute lateral meniscus tear of left knee, subsequent encounter  · Treatment Diagnosis: Bilateral knee pain, decreased ROM, flexibility, strength  Insurance/Certification information:  PT Insurance Information: [employer]  Physician Information:    Has the plan of care been signed (Y/N):        []  Yes  []  No     Date of Patient follow up with Physician:     Assessment Summary: Alexandria Ardon is a 39 y.o. male reporting to OP PT with c/c of bilateral knee pain and knee scopes bilaterally, left on 21 and right on 21. Pt is noted to have reduced ROM, flexibility and strength. Overall reduced functional mobility. Will progress as tolerated. Is this a Progress Report:     []  Yes  [x]  No        If Yes:  Date Range for reporting period:  Beginning   22  Ending 22    Progress report will be due (10 Rx or 30 days whichever is less): 0/3/06       Recertification will be due (POC Duration  / 90 days whichever is less): 3/4/22          Visit # Insurance Allowable Auth Required   In Person  30 []  Yes     []  No    Tele Health   []  Yes     []  No    Total 12             Functional Scale: LEFS 60%, 49%    Date assessed:        Latex Allergy:  [x]NO      []YES  Preferred Language for Healthcare:   [x]English       []other:      Pain level:  4/10     SUBJECTIVE:  Pt states knee feel a little better with muscle relaxers and anti inflammatories. OBJECTIVE:   No edema, erythema, no calf tenderness. RESTRICTIONS/PRECAUTIONS: None     Exercises/Interventions: HEP code: [id number]  Therapeutic Ex (67232) HEP 2/17/22 2/21/22 2/24/22   Warm-up       Rec bike  7', Lv 3 7', Lv 4 7', Lv 5          TABLE       Heel slides x      Strap gastroc stretch x      Hs stretch x      Quad set       SLR x      Bridge x  x10    Bridge w/ march   x15 B    Melvin stretch x 1' B 1' B           SEATED                                                 STANDING       Wedge gastroc stretch  1' 1' 1'   Heel raises 1/2 roll  10x3 10x3 10x3   Toe raises 1/2 roll  10x3 10x3 10x3   HS stretch  1' B 1' B 1' B   Anterior step ups  --     Anterior step downs  --     Lateral step ups  x20 B, 4\"     Resisted side steps  -- 10'x3 laps,     Knee extension machine       HS curl machine       COREY Abduction  10x2 B, 50# 10x2 B, 50#    COREY Extension  10x2 B, 50# 10x2 B, 50#    COREY Flexion  10x2 B, 50# 10x2 B, 50#    Leg Press DL   X15, 85# X15, 65#   Leg Press Eccentric  x20 B X15, 50# X15, 45#   Retrowalk CC    --   BOSU balance  1'  1'   BOSU marches  x30  x30   BOSU step ups    x20 B   Stool push SL  15'x2 ea     Stool pull DL  15'x2                                           Manual (57983): Therapeutic Exercise and NMR EXR  [x] (56801) Provided verbal/tactile cueing for activities related to strengthening, flexibility, endurance, ROM for improvements in LE, proximal hip, and core control with self care, mobility, lifting, ambulation. [x] (58060) Provided verbal/tactile cueing for activities related to improving balance, coordination, kinesthetic sense, posture, motor skill, proprioception to assist with LE, proximal hip, and core control in self-care, mobility, lifting, ambulation and eccentric single leg control.      NMR and Therapeutic Activities:    [x] (65966 or 23123) Provided verbal/tactile cueing for activities related to improving balance, coordination, kinesthetic sense, posture, motor skill, proprioception and motor activation to allow for proper function of core, proximal hip and LE with self-care and ADLs and functional mobility.   [] (76355) Gait Re-education- Provided training and instruction to the patient for proper LE, core and proximal hip recruitment and positioning and eccentric body weight control with ambulation re-education including up and down stairs     Home Exercise Program:    [x] (32733) Reviewed/Progressed HEP activities related to strengthening, flexibility, endurance, ROM of core, proximal hip and LE for functional self-care, mobility, lifting and ambulation/stair navigation   [] (13711) Reviewed/Progressed HEP activities related to improving balance, coordination, kinesthetic sense, posture, motor skill, proprioception of core, proximal hip and LE for self-care, mobility, lifting, and ambulation/stair navigation      Manual Treatments:  PROM / STM / Oscillations-Mobs:  G-I, II, III, IV (PA's, Inf., Post.)  [x] (82328) Provided manual therapy to mobilize LE, proximal hip and/or LS spine soft tissue/joints for the purpose of modulating pain, promoting relaxation, increasing ROM, reducing/eliminating soft tissue swelling/inflammation/restriction, improving soft tissue extensibility and allowing for proper ROM for normal function with self-care, mobility, lifting and ambulation. Modalities:     [x] GAME READY (VASO)- for significant edema, swelling, pain control.   Right LE, no adverse skin reaction      Charges:  Timed Code Treatment Minutes: 45   Total Treatment Minutes:  45   BWC:  TE TIME:  NMR TIME:  MANUAL TIME:   TA  UNTIMED MINUTES:  Medicare Total:   40  13      10        [] EVAL (LOW) 01948 (typically 20 minutes face-to-face)  [] EVAL (MOD) 10576 (typically 30 minutes face-to-face)  [] EVAL (HIGH) 98021 (typically 45 minutes face-to-face)  [] RE-EVAL     [x] BM(77748) 3     [] IONTO  [x] NMR (25694) 1     [x] VASO  [] Manual (32524) x     [] Dry Needle:  [] TA 1      [] Mech Traction (98737)  [] ES(attended) (32904)      [] ES (un) (17322):        GOALS:     Patient stated goal: Improve mobility, return to work      Therapist goals for Patient:   Short Term Goals: To be achieved in: 2 weeks  1. Independent in HEP and progression per patient tolerance, in order to prevent re-injury. [x]? Progressing: []? Met: []? Not Met: []? Adjusted      2. Patient will have a decrease in pain of NRPS to </=3/10 facilitate improvement in movement, function, and ADLs as indicated by Functional Deficits. [x]? Progressing: []? Met: []? Not Met: []? Adjusted      Long Term Goals: To be achieved in: 8 weeks  1. Disability index score of 30% or less for the LEFS to assist with reaching prior level of function. [x]? Progressing: []? Met: []? Not Met: []? Adjusted      2. Patient will demonstrate increased AROM to 0-130 to bilateral knees to allow for proper joint functioning as indicated by patients Functional Deficits. []? Progressing: []? Met: []? Not Met: []? Adjusted      3. Patient will demonstrate an increase in Strength to 5/5 to knee flex/ext allow for proper functional mobility as indicated by patients Functional Deficits. []? Progressing: []? Met: []? Not Met: []? Adjusted      4. Patient will return to functional walking activities with NRPS of </= 1/10. []? Progressing: []? Met: []? Not Met: []? Adjusted      5. Pt will be able to ascend/descend ladder with deviation to allow for return to work activities.  (patient specific functional goal)    []? Progressing: []? Met: []? Not Met: []? Adjusted                ASSESSMENT:  Pt joycelyn session well. Needed to reduced weight with leg press as he reported last time was too heavy. Patient received education on their current pathology and how their condition effects them with their functional activities. Patient understood discussion and questions were answered. Patient understands their activity limitations and understands rational for treatment progression.   Pt educated on plan of care and HEP, if worsening symptoms to d/c that exercise. PLAN: See eval  [x] Continue per plan of care [] Alter current plan (see comments above)  [] Plan of care initiated [] Hold pending MD visit [] Discharge      Electronically signed by:  Jackie Recio PT    Note: If patient does not return for scheduled/ recommended follow up visits, this note will serve as a discharge from care along with most recent update on progress.

## 2022-03-01 ENCOUNTER — HOSPITAL ENCOUNTER (OUTPATIENT)
Dept: PHYSICAL THERAPY | Age: 37
Setting detail: THERAPIES SERIES
Discharge: HOME | End: 2022-03-01
Payer: MEDICAID

## 2022-03-01 PROCEDURE — 97112 NEUROMUSCULAR REEDUCATION: CPT

## 2022-03-01 PROCEDURE — 97110 THERAPEUTIC EXERCISES: CPT

## 2022-03-01 NOTE — FLOWSHEET NOTE
[address] [place] [address] [facility] [address], [address] [place] [address] [address]  Phone: [phone]   Fax:     [phone]      Physical Therapy Treatment Note/ Progress Report:           Date:  3/1/2022    Patient Name:  Dagoberto Leary    :  1985  MRN: [id number]  Restrictions/Precautions:    Medical/Treatment Diagnosis Information:  · Diagnosis: M22.41, M22.42 (ICD-10-CM) - Chondromalacia of both patellae; S83.282D (ICD-10-CM) - Acute lateral meniscus tear of left knee, subsequent encounter  · Treatment Diagnosis: Bilateral knee pain, decreased ROM, flexibility, strength  Insurance/Certification information:  PT Insurance Information: Jose Lombardi  Physician Information:    Has the plan of care been signed (Y/N):        []  Yes  []  No     Date of Patient follow up with Physician:     Assessment Summary: Qing Santana is a 39 y.o. male reporting to OP PT with c/c of bilateral knee pain and knee scopes bilaterally, left on 21 and right on 21. Pt is noted to have reduced ROM, flexibility and strength. Overall reduced functional mobility. Will progress as tolerated. Is this a Progress Report:     []  Yes  [x]  No        If Yes:  Date Range for reporting period:  Beginning   22  Ending 22    Progress report will be due (10 Rx or 30 days whichever is less): 2/3/96       Recertification will be due (POC Duration  / 90 days whichever is less): 3/4/22          Visit # Insurance Allowable Auth Required   In Person  30 []  Yes     []  No    Tele Health   []  Yes     []  No    Total 13             Functional Scale: LEFS 60%, 49%    Date assessed:        Latex Allergy:  [x]NO      []YES  Preferred Language for Healthcare:   [x]English       []other:      Pain level:  5/10     SUBJECTIVE:  Pt states knees a re sore, about the same as last time    OBJECTIVE:   No edema, erythema, no calf tenderness.        RESTRICTIONS/PRECAUTIONS: None Exercises/Interventions: [facility] code: [address]  Therapeutic Ex (22174) HEP 2/21/22 2/24/22 3/1/24   Warm-up       Rec bike  7', Lv 4 7', Lv 5 6', Lv 5          TABLE       Heel slides x      Strap gastroc stretch x      Hs stretch x      Quad set       SLR x      Bridge x x10     Bridge w/ march  x15 B     Melvin stretch x 1' B            SEATED                                                 STANDING       Wedge gastroc stretch  1' 1' 1'   Heel raises 1/2 roll  10x3 10x3 10x3   Toe raises 1/2 roll  10x3 10x3 10x3   HS stretch  1' B 1' B 1'   Anterior step ups       Anterior step downs       Lateral step ups       Resisted side steps  10'x3 laps,      Knee extension machine       HS curl machine       COREY Abduction  10x2 B, 50#  10x2 B, 50#   COREY Extension  10x2 B, 50#  10x2 B, 50#   COREY Flexion  10x2 B, 50#  10x2 B, 50#   Leg Press DL  X15, 85# X15, 65# X15, 65#   Leg Press Eccentric  X15, 50# X15, 45# X15, 45#   Retrowalk CC   -- --   BOSU balance   1' 1'   BOSU marches   x30 --   BOSU step ups   x20 B x15 B   Stool push SL    20'x2 B   Stool pull DL    20'x4                                         Manual (84601): Therapeutic Exercise and NMR EXR  [x] (73527) Provided verbal/tactile cueing for activities related to strengthening, flexibility, endurance, ROM for improvements in LE, proximal hip, and core control with self care, mobility, lifting, ambulation. [x] (11687) Provided verbal/tactile cueing for activities related to improving balance, coordination, kinesthetic sense, posture, motor skill, proprioception to assist with LE, proximal hip, and core control in self-care, mobility, lifting, ambulation and eccentric single leg control.      NMR and Therapeutic Activities:    [x] (12046 or 18179) Provided verbal/tactile cueing for activities related to improving balance, coordination, kinesthetic sense, posture, motor skill, proprioception and motor activation to allow for proper function of core, proximal hip and LE with self-care and ADLs and functional mobility.   [] (55592) Gait Re-education- Provided training and instruction to the patient for proper LE, core and proximal hip recruitment and positioning and eccentric body weight control with ambulation re-education including up and down stairs     Home Exercise Program:    [x] (58647) Reviewed/Progressed HEP activities related to strengthening, flexibility, endurance, ROM of core, proximal hip and LE for functional self-care, mobility, lifting and ambulation/stair navigation   [] (95912) Reviewed/Progressed HEP activities related to improving balance, coordination, kinesthetic sense, posture, motor skill, proprioception of core, proximal hip and LE for self-care, mobility, lifting, and ambulation/stair navigation      Manual Treatments:  PROM / STM / Oscillations-Mobs:  G-I, II, III, IV (PA's, Inf., Post.)  [x] (32184) Provided manual therapy to mobilize LE, proximal hip and/or LS spine soft tissue/joints for the purpose of modulating pain, promoting relaxation, increasing ROM, reducing/eliminating soft tissue swelling/inflammation/restriction, improving soft tissue extensibility and allowing for proper ROM for normal function with self-care, mobility, lifting and ambulation. Modalities:     [x] GAME READY (VASO)- for significant edema, swelling, pain control.   Right LE, no adverse skin reaction      Charges:  Timed Code Treatment Minutes: 53   Total Treatment Minutes:  53   BWC:  TE TIME:  NMR TIME:  MANUAL TIME:   TA  UNTIMED MINUTES:  Medicare Total:   40  13      10        [] EVAL (LOW) 94924 (typically 20 minutes face-to-face)  [] EVAL (MOD) 64775 (typically 30 minutes face-to-face)  [] EVAL (HIGH) 13705 (typically 45 minutes face-to-face)  [] RE-EVAL     [x] MU(44812) 3     [] IONTO  [x] NMR (57816) 1     [x] VASO  [] Manual (20204) x     [] Dry Needle:  [] TA 1      [] Mech Traction (97965)  [] ES(attended) (38685)      [] ES (un) (73720):        GOALS:     Patient stated goal: Improve mobility, return to work      Therapist goals for Patient:   Short Term Goals: To be achieved in: 2 weeks  1. Independent in HEP and progression per patient tolerance, in order to prevent re-injury. [x]? Progressing: []? Met: []? Not Met: []? Adjusted      2. Patient will have a decrease in pain of NRPS to </=3/10 facilitate improvement in movement, function, and ADLs as indicated by Functional Deficits. [x]? Progressing: []? Met: []? Not Met: []? Adjusted      Long Term Goals: To be achieved in: 8 weeks  1. Disability index score of 30% or less for the LEFS to assist with reaching prior level of function. [x]? Progressing: []? Met: []? Not Met: []? Adjusted      2. Patient will demonstrate increased AROM to 0-130 to bilateral knees to allow for proper joint functioning as indicated by patients Functional Deficits. []? Progressing: []? Met: []? Not Met: []? Adjusted      3. Patient will demonstrate an increase in Strength to 5/5 to knee flex/ext allow for proper functional mobility as indicated by patients Functional Deficits. []? Progressing: []? Met: []? Not Met: []? Adjusted      4. Patient will return to functional walking activities with NRPS of </= 1/10. []? Progressing: []? Met: []? Not Met: []? Adjusted      5. Pt will be able to ascend/descend ladder with deviation to allow for return to work activities.  (patient specific functional goal)    []? Progressing: []? Met: []? Not Met: []? Adjusted                ASSESSMENT:  Noel Ruiz has been seen in PT for 13 visits for bilateral knee pain. Pt has responded fair overall to PT session which have been addressing progressive functional strengthening, balance and flexibility. Pt is reporting pain levels are improving some. Continues to do lower weight than what I would expect at this stage. There is some self limiting behavior I feel.  Will continue trying to work through progressive loading and more functional loading to help facilitate return to work            Patient received education on their current pathology and how their condition effects them with their functional activities. Patient understood discussion and questions were answered. Patient understands their activity limitations and understands rational for treatment progression. Pt educated on plan of care and HEP, if worsening symptoms to d/c that exercise. PLAN: See eval  [x] Continue per plan of care [] Alter current plan (see comments above)  [] Plan of care initiated [] Hold pending MD visit [] Discharge      Electronically signed by:  Zakia Negrete PT    Note: If patient does not return for scheduled/ recommended follow up visits, this note will serve as a discharge from care along with most recent update on progress.

## 2022-03-01 NOTE — PROGRESS NOTES
[address] [facility] and [facility], [address] [place] [address] [address]  Phone: [phone]   Fax: [phone]    Date: 3/1/2022          Patient Name; :  Dagmar Stiles; 1985   Dx: Diagnosis: M22.41, M22.42 (ICD-10-CM) - Chondromalacia of both patellae; E33.421K (ICD-10-CM) - Acute lateral meniscus tear of left knee, subsequent encounter      Physician: Referring Practitioner: Aaron Garcia MD        Total PT Visits: 13     Measures Previous Current   Pain (0-10) 7 5   Disability % 60 49         Assessment:  Rocío Covington has been seen in PT for 13 visits for bilateral knee pain. Pt has responded fair overall to PT session which have been addressing progressive functional strengthening, balance and flexibility. Pt is reporting pain levels are improving some. Continues to do lower weight than what I would expect at this stage. There is some self limiting behavior I feel. Will continue trying to work through progressive loading and more functional loading to help facilitate return to work         Prognosis for POC: [x] Good [] Fair  [] Poor      Patient requires continued skilled intervention: [x] Yes  [] No        Plan & Recommendations:  [x] Continue rehabilitation due to objective improvement and continued functional deficits with frequency and duration: 2x week, 6 weeks. [] Progress toward  []GAP, []Work Conditioning, []Independent HEP   [] Discharge due to   [] All goals achieved, [] Maximized \"medical necessity\" [] No subjective or objective improvements      Electronically signed by:  Dorcas Orantes, PT  Therapy Plan of Care Re-Certification  This patient has been re-evaluated for physical therapy services and for therapy to continue, Medicare, Medicaid and other insurances require periodic physician review of the treatment plan.  Please review the above re-evaluation and verify that you agree with plan of care as established above by signing the

## 2022-03-03 ENCOUNTER — HOSPITAL ENCOUNTER (OUTPATIENT)
Dept: PHYSICAL THERAPY | Age: 37
Setting detail: THERAPIES SERIES
Discharge: HOME | End: 2022-03-03
Payer: MEDICAID

## 2022-03-03 PROCEDURE — 97112 NEUROMUSCULAR REEDUCATION: CPT

## 2022-03-03 PROCEDURE — 97110 THERAPEUTIC EXERCISES: CPT

## 2022-03-03 NOTE — FLOWSHEET NOTE
[address] [facility] and Sports Rehabilitation[address], [address] [place] [address] [address]  Phone: [phone]   Fax:     [phone]      Physical Therapy Treatment Note/ Progress Report:           Date:  3/3/2022    Patient Name:  Cherri Almanzar    :  1985  MRN: [id number]  Restrictions/Precautions:    Medical/Treatment Diagnosis Information:  · Diagnosis: M22.41, M22.42 (ICD-10-CM) - Chondromalacia of both patellae; S83.282D (ICD-10-CM) - Acute lateral meniscus tear of left knee, subsequent encounter  · Treatment Diagnosis: Bilateral knee pain, decreased ROM, flexibility, strength  Insurance/Certification information:  PT Insurance Information: Soni Olguin  Physician Information:    Has the plan of care been signed (Y/N):        []  Yes  []  No     Date of Patient follow up with Physician:     Assessment Summary: Gama Deleon is a 39 y.o. male reporting to OP PT with c/c of bilateral knee pain and knee scopes bilaterally, left on 21 and right on 21. Pt is noted to have reduced ROM, flexibility and strength. Overall reduced functional mobility. Will progress as tolerated. Is this a Progress Report:     []  Yes  [x]  No        If Yes:  Date Range for reporting period:  Beginning   22  Ending 22    Progress report will be due (10 Rx or 30 days whichever is less): 46       Recertification will be due (POC Duration  / 90 days whichever is less): 3/4/22          Visit # Insurance Allowable Auth Required   In Person  30 []  Yes     []  No    Tele Health   []  Yes     []  No    Total 14             Functional Scale: LEFS 60%, 49%    Date assessed:        Latex Allergy:  [x]NO      []YES  Preferred Language for Healthcare:   [x]English       []other:      Pain level:  4/10     SUBJECTIVE:  Pt states knee are feeling too bad today    OBJECTIVE:   No edema, erythema, no calf tenderness.        RESTRICTIONS/PRECAUTIONS: None     Exercises/Interventions: HEP code: [address] [facility]  Therapeutic Ex (60085) HEP 2/21/22 2/24/22 3/3/24    Warm-up        Rec bike  7', Lv 4 7', Lv 5 7', Lv 5            TABLE        Heel slides x       Strap gastroc stretch x       Hs stretch x       Quad set        SLR x       Bridge x x10      Bridge w/ march  x15 B      Melvin stretch x 1' B              SEATED                                                        STANDING        Wedge gastroc stretch  1' 1' 1'    Heel raises 1/2 roll  10x3 10x3 10x3    Toe raises 1/2 roll  10x3 10x3 10x3    HS stretch  1' B 1' B 1'    Anterior step ups        Anterior step downs        Lateral step ups        Resisted side steps  10'x3 laps,       Knee extension machine        HS curl machine        COREY Abduction  10x2 B, 50#  10x2 B, 50#    COREY Extension  10x2 B, 50#  10x2 B, 50#    COREY Flexion  10x2 B, 50#  10x2 B, 50#    Leg Press DL  X15, 85# X15, 65# X15, 65#    Leg Press Eccentric  X15, 50# X15, 45# X15, 45#    Retrowalk CC   -- --    BOSU balance   1' 1'    BOSU marches   x30 --    BOSU step ups   x20 B x15 B    Stool push SL    20'x2 B    Stool pull DL    20'x4                                               Manual (71705): Therapeutic Exercise and NMR EXR  [x] (76306) Provided verbal/tactile cueing for activities related to strengthening, flexibility, endurance, ROM for improvements in LE, proximal hip, and core control with self care, mobility, lifting, ambulation. [x] (74030) Provided verbal/tactile cueing for activities related to improving balance, coordination, kinesthetic sense, posture, motor skill, proprioception to assist with LE, proximal hip, and core control in self-care, mobility, lifting, ambulation and eccentric single leg control.      NMR and Therapeutic Activities:    [x] (29859 or 60520) Provided verbal/tactile cueing for activities related to improving balance, coordination, kinesthetic sense, posture, motor skill, proprioception and motor activation to allow for proper function of core, proximal hip and LE with self-care and ADLs and functional mobility.   [] (93652) Gait Re-education- Provided training and instruction to the patient for proper LE, core and proximal hip recruitment and positioning and eccentric body weight control with ambulation re-education including up and down stairs     Home Exercise Program:    [x] (76293) Reviewed/Progressed HEP activities related to strengthening, flexibility, endurance, ROM of core, proximal hip and LE for functional self-care, mobility, lifting and ambulation/stair navigation   [] (06820) Reviewed/Progressed HEP activities related to improving balance, coordination, kinesthetic sense, posture, motor skill, proprioception of core, proximal hip and LE for self-care, mobility, lifting, and ambulation/stair navigation      Manual Treatments:  PROM / STM / Oscillations-Mobs:  G-I, II, III, IV (PA's, Inf., Post.)  [x] (50478) Provided manual therapy to mobilize LE, proximal hip and/or LS spine soft tissue/joints for the purpose of modulating pain, promoting relaxation, increasing ROM, reducing/eliminating soft tissue swelling/inflammation/restriction, improving soft tissue extensibility and allowing for proper ROM for normal function with self-care, mobility, lifting and ambulation. Modalities:     [x] GAME READY (VASO)- for significant edema, swelling, pain control.   Right LE, no adverse skin reaction      Charges:  Timed Code Treatment Minutes: 45   Total Treatment Minutes:  45   BWC:  TE TIME:  NMR TIME:  MANUAL TIME:   TA  UNTIMED MINUTES:  Medicare Total:   35  10              [] EVAL (LOW) 90934 (typically 20 minutes face-to-face)  [] EVAL (MOD) 94950 (typically 30 minutes face-to-face)  [] EVAL (HIGH) 95092 (typically 45 minutes face-to-face)  [] RE-EVAL     [x] RQ(75476) 2     [] IONTO  [x] NMR (05874) 1     [x] VASO  [] Manual (64619) x     [] Dry Needle:  [] TA 1      [] Mech Traction (56849)  [] ES(attended) (91758)      [] ES (un) (70143):        GOALS:     Patient stated goal: Improve mobility, return to work      Therapist goals for Patient:   Short Term Goals: To be achieved in: 2 weeks  1. Independent in HEP and progression per patient tolerance, in order to prevent re-injury. [x]? Progressing: []? Met: []? Not Met: []? Adjusted      2. Patient will have a decrease in pain of NRPS to </=3/10 facilitate improvement in movement, function, and ADLs as indicated by Functional Deficits. [x]? Progressing: []? Met: []? Not Met: []? Adjusted      Long Term Goals: To be achieved in: 8 weeks  1. Disability index score of 30% or less for the LEFS to assist with reaching prior level of function. [x]? Progressing: []? Met: []? Not Met: []? Adjusted      2. Patient will demonstrate increased AROM to 0-130 to bilateral knees to allow for proper joint functioning as indicated by patients Functional Deficits. []? Progressing: []? Met: []? Not Met: []? Adjusted      3. Patient will demonstrate an increase in Strength to 5/5 to knee flex/ext allow for proper functional mobility as indicated by patients Functional Deficits. []? Progressing: []? Met: []? Not Met: []? Adjusted      4. Patient will return to functional walking activities with NRPS of </= 1/10. []? Progressing: []? Met: []? Not Met: []? Adjusted      5. Pt will be able to ascend/descend ladder with deviation to allow for return to work activities.  (patient specific functional goal)    []? Progressing: []? Met: []? Not Met: []? Adjusted                ASSESSMENT:  Kimberly Levine session well. Able to make some weight progressions. Patient received education on their current pathology and how their condition effects them with their functional activities. Patient understood discussion and questions were answered. Patient understands their activity limitations and understands rational for treatment progression.   Pt educated on plan of care and HEP, if worsening symptoms to d/c that exercise. PLAN: See eval  [x] Continue per plan of care [] Alter current plan (see comments above)  [] Plan of care initiated [] Hold pending MD visit [] Discharge      Electronically signed by:  Vladimir Wagner PT    Note: If patient does not return for scheduled/ recommended follow up visits, this note will serve as a discharge from care along with most recent update on progress.

## 2022-03-07 ENCOUNTER — HOSPITAL ENCOUNTER (OUTPATIENT)
Dept: PHYSICAL THERAPY | Age: 37
Setting detail: THERAPIES SERIES
Discharge: HOME | End: 2022-03-07
Payer: MEDICAID

## 2022-03-07 PROCEDURE — 97530 THERAPEUTIC ACTIVITIES: CPT

## 2022-03-07 PROCEDURE — 97110 THERAPEUTIC EXERCISES: CPT

## 2022-03-07 PROCEDURE — 97112 NEUROMUSCULAR REEDUCATION: CPT

## 2022-03-07 NOTE — FLOWSHEET NOTE
[facility], [address] [place] [address] [address]  Phone: [phone]   Fax:     [phone]      Physical Therapy Treatment Note/ Progress Report:           Date:  3/7/2022    Patient Name:  Adelaide Goldmann    :  1985  MRN: [id number]  Restrictions/Precautions:    Medical/Treatment Diagnosis Information:  · Diagnosis: M22.41, M22.42 (ICD-10-CM) - Chondromalacia of both patellae; S83.282D (ICD-10-CM) - Acute lateral meniscus tear of left knee, subsequent encounter  · Treatment Diagnosis: Bilateral knee pain, decreased ROM, flexibility, strength  Insurance/Certification information:  PT Insurance Information: [employer]  Physician Information:    Has the plan of care been signed (Y/N):        []  Yes  []  No     Date of Patient follow up with Physician:     Assessment Summary: Tomy Rosales is a 39 y.o. male reporting to OP PT with c/c of bilateral knee pain and knee scopes bilaterally, left on 21 and right on 21. Pt is noted to have reduced ROM, flexibility and strength. Overall reduced functional mobility. Will progress as tolerated. Is this a Progress Report:     []  Yes  [x]  No        If Yes:  Date Range for reporting period:  Beginning   22  Ending 22    Progress report will be due (10 Rx or 30 days whichever is less): 06       Recertification will be due (POC Duration  / 90 days whichever is less): 3/4/22          Visit # Insurance Allowable Auth Required   In Person  30 []  Yes     []  No    Tele Health   []  Yes     []  No    Total 15             Functional Scale: LEFS 60%, 49%    Date assessed:        Latex Allergy:  [x]NO      []YES  Preferred Language for Healthcare:   [x]English       []other:      Pain level:  310     SUBJECTIVE:  Pt states knee are a little sore. OBJECTIVE:   No edema, erythema, no calf tenderness.        RESTRICTIONS/PRECAUTIONS: None     Exercises/Interventions: HEP code: [id number]  Therapeutic Ex (95959) HEP 2/21/22 2/24/22 3/3/24 3/7/22   Warm-up        Rec bike  7', Lv 4 7', Lv 5 7', Lv 5 7', Lv 5           TABLE        Heel slides x       Strap gastroc stretch x       Hs stretch x       Quad set        SLR x       Bridge x x10      Bridge w/ march  x15 B      Melvin stretch x 1' B              SEATED                                                        STANDING        Wedge gastroc stretch  1' 1' 1' 1'   Heel raises 1/2 roll  10x3 10x3 10x3 10x3   Toe raises 1/2 roll  10x3 10x3 10x3 10x3   HS stretch  1' B 1' B 1' 1'   Anterior step ups        Anterior step downs        Lateral step ups        Resisted side steps  10'x3 laps,    10'x3 laps, OTB   Knee extension machine     X30, 40#   HS curl machine     X30, 50#   COREY Abduction  10x2 B, 50#  10x2 B, 50# x20 B, 60#   COREY Extension  10x2 B, 50#  10x2 B, 50# x20 B, 60#   COREY Flexion  10x2 B, 50#  10x2 B, 50# --   Leg Press DL  X15, 85# X15, 65# X15, 65# X15, 70#   Leg Press Eccentric  X15, 50# X15, 45# X15, 45# X15, 50#   Retrowalk CC   -- -- X10, 45#   BOSU balance   1' 1' 1'   BOSU marches   x30 -- x30   BOSU step ups   x20 B x15 B x20 B   Stool push SL    20'x2 B    Stool pull DL    20'x4    Ladder     x10                                      Manual (39679): Therapeutic Exercise and NMR EXR  [x] (88895) Provided verbal/tactile cueing for activities related to strengthening, flexibility, endurance, ROM for improvements in LE, proximal hip, and core control with self care, mobility, lifting, ambulation. [x] (08646) Provided verbal/tactile cueing for activities related to improving balance, coordination, kinesthetic sense, posture, motor skill, proprioception to assist with LE, proximal hip, and core control in self-care, mobility, lifting, ambulation and eccentric single leg control.      NMR and Therapeutic Activities:    [x] (05215 or 83780) Provided verbal/tactile cueing for activities related to improving balance, coordination, kinesthetic sense, posture, motor skill, proprioception and motor activation to allow for proper function of core, proximal hip and LE with self-care and ADLs and functional mobility.   [] (08689) Gait Re-education- Provided training and instruction to the patient for proper LE, core and proximal hip recruitment and positioning and eccentric body weight control with ambulation re-education including up and down stairs     Home Exercise Program:    [x] (71393) Reviewed/Progressed HEP activities related to strengthening, flexibility, endurance, ROM of core, proximal hip and LE for functional self-care, mobility, lifting and ambulation/stair navigation   [] (12300) Reviewed/Progressed HEP activities related to improving balance, coordination, kinesthetic sense, posture, motor skill, proprioception of core, proximal hip and LE for self-care, mobility, lifting, and ambulation/stair navigation      Manual Treatments:  PROM / STM / Oscillations-Mobs:  G-I, II, III, IV (PA's, Inf., Post.)  [x] (32094) Provided manual therapy to mobilize LE, proximal hip and/or LS spine soft tissue/joints for the purpose of modulating pain, promoting relaxation, increasing ROM, reducing/eliminating soft tissue swelling/inflammation/restriction, improving soft tissue extensibility and allowing for proper ROM for normal function with self-care, mobility, lifting and ambulation. Modalities:     [x] GAME READY (VASO)- for significant edema, swelling, pain control.   Right LE, no adverse skin reaction      Charges:  Timed Code Treatment Minutes: 53   Total Treatment Minutes:  48   BWC:  TE TIME:  NMR TIME:  MANUAL TIME:   TA  UNTIMED MINUTES:  Medicare Total:   35  10    8          [] EVAL (LOW) 45195 (typically 20 minutes face-to-face)  [] EVAL (MOD) 50491 (typically 30 minutes face-to-face)  [] EVAL (HIGH) 15800 (typically 45 minutes face-to-face)  [] RE-EVAL     [x] LA(81955) 2     [] IONTO  [x] NMR (38943) 1     [x] VASO  [] Manual (40559) x     [] Dry Needle:  [x] TA 1      [] Mech Traction (50889)  [] ES(attended) (07937)      [] ES (un) (20296):        GOALS:     Patient stated goal: Improve mobility, return to work      Therapist goals for Patient:   Short Term Goals: To be achieved in: 2 weeks  1. Independent in HEP and progression per patient tolerance, in order to prevent re-injury. [x]? Progressing: []? Met: []? Not Met: []? Adjusted      2. Patient will have a decrease in pain of NRPS to </=3/10 facilitate improvement in movement, function, and ADLs as indicated by Functional Deficits. [x]? Progressing: []? Met: []? Not Met: []? Adjusted      Long Term Goals: To be achieved in: 8 weeks  1. Disability index score of 30% or less for the LEFS to assist with reaching prior level of function. [x]? Progressing: []? Met: []? Not Met: []? Adjusted      2. Patient will demonstrate increased AROM to 0-130 to bilateral knees to allow for proper joint functioning as indicated by patients Functional Deficits. []? Progressing: []? Met: []? Not Met: []? Adjusted      3. Patient will demonstrate an increase in Strength to 5/5 to knee flex/ext allow for proper functional mobility as indicated by patients Functional Deficits. []? Progressing: []? Met: []? Not Met: []? Adjusted      4. Patient will return to functional walking activities with NRPS of </= 1/10. []? Progressing: []? Met: []? Not Met: []? Adjusted      5. Pt will be able to ascend/descend ladder with deviation to allow for return to work activities.  (patient specific functional goal)    []? Progressing: []? Met: []? Not Met: []? Adjusted                ASSESSMENT:  Brigitte Hancocktist session well. Patient received education on their current pathology and how their condition effects them with their functional activities. Patient understood discussion and questions were answered.  Patient understands their activity limitations and understands rational for treatment progression. Pt educated on plan of care and HEP, if worsening symptoms to d/c that exercise. PLAN: See eval  [x] Continue per plan of care [] Alter current plan (see comments above)  [] Plan of care initiated [] Hold pending MD visit [] Discharge      Electronically signed by:  Rosalina Goff PT    Note: If patient does not return for scheduled/ recommended follow up visits, this note will serve as a discharge from care along with most recent update on progress.

## 2022-03-10 ENCOUNTER — HOSPITAL ENCOUNTER (OUTPATIENT)
Dept: PHYSICAL THERAPY | Age: 37
Setting detail: THERAPIES SERIES
Discharge: HOME | End: 2022-03-10
Payer: MEDICAID

## 2022-03-10 PROCEDURE — 97110 THERAPEUTIC EXERCISES: CPT

## 2022-03-10 PROCEDURE — 97112 NEUROMUSCULAR REEDUCATION: CPT

## 2022-03-10 PROCEDURE — 97016 VASOPNEUMATIC DEVICE THERAPY: CPT

## 2022-03-10 NOTE — FLOWSHEET NOTE
3 [facility], [address] [place] [address] [address]  Phone: [phone]   Fax:     [phone]      Physical Therapy Treatment Note/ Progress Report:           Date:  3/10/2022    Patient Name:  Eric Castro    :  1985  MRN: [id number]  Restrictions/Precautions:    Medical/Treatment Diagnosis Information:  · Diagnosis: M22.41, M22.42 (ICD-10-CM) - Chondromalacia of both patellae; S83.282D (ICD-10-CM) - Acute lateral meniscus tear of left knee, subsequent encounter  · Treatment Diagnosis: Bilateral knee pain, decreased ROM, flexibility, strength  Insurance/Certification information:  PT Insurance Information: Amanda Melendez  Physician Information:    Has the plan of care been signed (Y/N):        []  Yes  []  No     Date of Patient follow up with Physician:     Assessment Summary: Brigid Graham is a 39 y.o. male reporting to OP PT with c/c of bilateral knee pain and knee scopes bilaterally, left on 21 and right on 21. Pt is noted to have reduced ROM, flexibility and strength. Overall reduced functional mobility. Will progress as tolerated. Is this a Progress Report:     []  Yes  [x]  No        If Yes:  Date Range for reporting period:  Beginning   22  Ending 22    Progress report will be due (10 Rx or 30 days whichever is less): 34       Recertification will be due (POC Duration  / 90 days whichever is less): 3/4/22          Visit # Insurance Allowable Auth Required   In Person  30 []  Yes     []  No    Tele Health   []  Yes     []  No    Total 16             Functional Scale: LEFS 60%, 49%    Date assessed:        Latex Allergy:  [x]NO      []YES  Preferred Language for Healthcare:   [x]English       []other:      Pain level:  310     SUBJECTIVE:  Pt states knees are sore. OBJECTIVE:   No edema, erythema, no calf tenderness.        RESTRICTIONS/PRECAUTIONS: None     Exercises/Interventions: HEP code: [id number]  Therapeutic Ex (73671) HEP 2/24/22 3/3/24 3/7/22 3/10/22   Warm-up        Rec bike  7', Lv 5 7', Lv 5 7', Lv 5 7', Lv 5           TABLE        Heel slides x       Strap gastroc stretch x       Hs stretch x       Quad set        SLR x       Bridge x       Bridge w/ march        Melvin stretch x               SEATED                                                        STANDING        Wedge gastroc stretch  1' 1' 1' 1'   Heel raises 1/2 roll  10x3 10x3 10x3 10x3   Toe raises 1/2 roll  10x3 10x3 10x3 10x3   HS stretch  1' B 1' 1' 1'   Anterior step ups     --   Anterior step downs     --   Lateral step ups     --   Resisted side steps    10'x3 laps, OTB --   Knee extension machine    X30, 40#    HS curl machine    X30, 50# X30, 50#   COREY Abduction   10x2 B, 50# x20 B, 60# x20  B, 75#   COREY Extension   10x2 B, 50# x20 B, 60# x20 B, 75#   COREY Flexion   10x2 B, 50# -- x20 B, 60#   Leg Press DL  X15, 65# X15, 65# X15, 70# X15, 80#   Leg Press Eccentric  X15, 45# X15, 45# X15, 50# X10, 60#   Retrowalk CC  -- -- X10, 45# X10, 45#   BOSU balance  1' 1' 1' 1'   BOSU marches  x30 -- x30 X30   BOSU step ups  x20 B x15 B x20 B x15   Stool push SL   20'x2 B  --   Stool pull DL   20'x4  --   Ladder    x10 --                                      Manual (69661): Therapeutic Exercise and NMR EXR  [x] (99404) Provided verbal/tactile cueing for activities related to strengthening, flexibility, endurance, ROM for improvements in LE, proximal hip, and core control with self care, mobility, lifting, ambulation. [x] (04623) Provided verbal/tactile cueing for activities related to improving balance, coordination, kinesthetic sense, posture, motor skill, proprioception to assist with LE, proximal hip, and core control in self-care, mobility, lifting, ambulation and eccentric single leg control.      NMR and Therapeutic Activities:    [x] (41659 or 39779) Provided verbal/tactile cueing for activities related to improving VASO  [] Manual (34077) x     [] Dry Needle:  [] TA 1      [] Mech Traction (31030)  [] ES(attended) (96435)      [] ES (un) (30600):        GOALS:     Patient stated goal: Improve mobility, return to work      Therapist goals for Patient:   Short Term Goals: To be achieved in: 2 weeks  1. Independent in HEP and progression per patient tolerance, in order to prevent re-injury. [x]? Progressing: []? Met: []? Not Met: []? Adjusted      2. Patient will have a decrease in pain of NRPS to </=3/10 facilitate improvement in movement, function, and ADLs as indicated by Functional Deficits. [x]? Progressing: []? Met: []? Not Met: []? Adjusted      Long Term Goals: To be achieved in: 8 weeks  1. Disability index score of 30% or less for the LEFS to assist with reaching prior level of function. [x]? Progressing: []? Met: []? Not Met: []? Adjusted      2. Patient will demonstrate increased AROM to 0-130 to bilateral knees to allow for proper joint functioning as indicated by patients Functional Deficits. []? Progressing: []? Met: []? Not Met: []? Adjusted      3. Patient will demonstrate an increase in Strength to 5/5 to knee flex/ext allow for proper functional mobility as indicated by patients Functional Deficits. []? Progressing: []? Met: []? Not Met: []? Adjusted      4. Patient will return to functional walking activities with NRPS of </= 1/10. []? Progressing: []? Met: []? Not Met: []? Adjusted      5. Pt will be able to ascend/descend ladder with deviation to allow for return to work activities.  (patient specific functional goal)    []? Progressing: []? Met: []? Not Met: []? Adjusted                ASSESSMENT:  Fred Bravo session well. Patient received education on their current pathology and how their condition effects them with their functional activities. Patient understood discussion and questions were answered.  Patient understands their activity limitations and understands rational for treatment progression. Pt educated on plan of care and HEP, if worsening symptoms to d/c that exercise. PLAN: See eval  [x] Continue per plan of care [] Alter current plan (see comments above)  [] Plan of care initiated [] Hold pending MD visit [] Discharge      Electronically signed by:  Jazmin Landin, PT    Note: If patient does not return for scheduled/ recommended follow up visits, this note will serve as a discharge from care along with most recent update on progress.

## 2022-03-14 ENCOUNTER — HOSPITAL ENCOUNTER (OUTPATIENT)
Dept: PHYSICAL THERAPY | Age: 37
Setting detail: THERAPIES SERIES
Discharge: HOME | End: 2022-03-14
Payer: MEDICAID

## 2022-03-14 PROCEDURE — 97110 THERAPEUTIC EXERCISES: CPT

## 2022-03-14 PROCEDURE — 97112 NEUROMUSCULAR REEDUCATION: CPT

## 2022-03-14 PROCEDURE — 97530 THERAPEUTIC ACTIVITIES: CPT

## 2022-03-14 NOTE — FLOWSHEET NOTE
393 [facility], [address] [place] [address] [address]  Phone: [phone]   Fax:     [phone]      Physical Therapy Treatment Note/ Progress Report:           Date:  3/14/2022    Patient Name:  Adelaide Goldmann    :  1985  MRN: [id number]  Restrictions/Precautions:    Medical/Treatment Diagnosis Information:  · Diagnosis: M22.41, M22.42 (ICD-10-CM) - Chondromalacia of both patellae; S83.282D (ICD-10-CM) - Acute lateral meniscus tear of left knee, subsequent encounter  · Treatment Diagnosis: Bilateral knee pain, decreased ROM, flexibility, strength  Insurance/Certification information:  PT Insurance Information: [employer]  Physician Information:    Has the plan of care been signed (Y/N):        []  Yes  []  No     Date of Patient follow up with Physician:     Assessment Summary: Tomy Rosales is a 39 y.o. male reporting to OP PT with c/c of bilateral knee pain and knee scopes bilaterally, left on 21 and right on 21. Pt is noted to have reduced ROM, flexibility and strength. Overall reduced functional mobility. Will progress as tolerated. Is this a Progress Report:     []  Yes  [x]  No        If Yes:  Date Range for reporting period:  Beginning   22  Ending 22    Progress report will be due (10 Rx or 30 days whichever is less): 57       Recertification will be due (POC Duration  / 90 days whichever is less): 3/4/22          Visit # Insurance Allowable Auth Required   In Person  30 []  Yes     []  No    Tele Health   []  Yes     []  No    Total 17             Functional Scale: LEFS 60%, 49%    Date assessed:        Latex Allergy:  [x]NO      []YES  Preferred Language for Healthcare:   [x]English       []other:      Pain level:  2/10     SUBJECTIVE:  Pt states knees were sore over weekend but feel ok today. OBJECTIVE:   No edema, erythema, no calf tenderness.        RESTRICTIONS/PRECAUTIONS: None Exercises/Interventions: [facility] code: [address]  Therapeutic Ex (24073) HEP 3/7/22 3/10/22 3/14/22   Warm-up       Rec bike  7', Lv 5 7', Lv 5 7', Lv 5          TABLE       Heel slides x      Strap gastroc stretch x      Hs stretch x      Quad set       SLR x      Bridge x      Bridge w/ march       Melvin stretch x   1' B   Quad stretch    1' B          SEATED                                                 STANDING       Wedge gastroc stretch  1' 1' 1'   Heel raises 1/2 roll  10x3 10x3 10x3   Toe raises 1/2 roll  10x3 10x3 10x3   HS stretch  1' 1' 1'   Anterior step ups   --    Anterior step downs   --    Lateral step ups   --    Resisted side steps  10'x3 laps, OTB -- 10'x3 laps   Knee extension machine  X30, 40#     HS curl machine  X30, 50# X30, 50#    COREY Abduction  x20 B, 60# x20  B, 75# x20 B, 65#   COREY Extension  x20 B, 60# x20 B, 75# x20 B, 65#   COREY Flexion  -- x20 B, 60# x20 B, 65#   Leg Press DL  X15, 70# X15, 80# X15, 80#   Leg Press Eccentric  X15, 50# X10, 60# x15 B, 60#   Retrowalk CC  X10, 45# X10, 45# x15 B, 45#   BOSU balance  1' 1' 1'   BOSU squats    x15   BOSU marches  x30 X30    BOSU step ups  x20 B x15 x15 B   Stool push SL   -- 20'x2 ea   Stool pull DL   -- 20'x2   Ladder  x10 -- x10                                  Manual (75187): Therapeutic Exercise and NMR EXR  [x] (52708) Provided verbal/tactile cueing for activities related to strengthening, flexibility, endurance, ROM for improvements in LE, proximal hip, and core control with self care, mobility, lifting, ambulation. [x] (42798) Provided verbal/tactile cueing for activities related to improving balance, coordination, kinesthetic sense, posture, motor skill, proprioception to assist with LE, proximal hip, and core control in self-care, mobility, lifting, ambulation and eccentric single leg control.      NMR and Therapeutic Activities:    [x] (33046 or 09429) Provided verbal/tactile cueing for activities related to improving balance, coordination, kinesthetic sense, posture, motor skill, proprioception and motor activation to allow for proper function of core, proximal hip and LE with self-care and ADLs and functional mobility.   [] (03523) Gait Re-education- Provided training and instruction to the patient for proper LE, core and proximal hip recruitment and positioning and eccentric body weight control with ambulation re-education including up and down stairs     Home Exercise Program:    [x] (12688) Reviewed/Progressed HEP activities related to strengthening, flexibility, endurance, ROM of core, proximal hip and LE for functional self-care, mobility, lifting and ambulation/stair navigation   [] (43231) Reviewed/Progressed HEP activities related to improving balance, coordination, kinesthetic sense, posture, motor skill, proprioception of core, proximal hip and LE for self-care, mobility, lifting, and ambulation/stair navigation      Manual Treatments:  PROM / STM / Oscillations-Mobs:  G-I, II, III, IV (PA's, Inf., Post.)  [x] (59712) Provided manual therapy to mobilize LE, proximal hip and/or LS spine soft tissue/joints for the purpose of modulating pain, promoting relaxation, increasing ROM, reducing/eliminating soft tissue swelling/inflammation/restriction, improving soft tissue extensibility and allowing for proper ROM for normal function with self-care, mobility, lifting and ambulation. Modalities:     [x] GAME READY (VASO)- for significant edema, swelling, pain control.   Right LE, no adverse skin reaction      Charges:  Timed Code Treatment Minutes: 55   Total Treatment Minutes:  55   BWC:  TE TIME:  NMR TIME:  MANUAL TIME:   TA  UNTIMED MINUTES:  Medicare Total:   35  10    10          [] EVAL (LOW) 80289 (typically 20 minutes face-to-face)  [] EVAL (MOD) 70735 (typically 30 minutes face-to-face)  [] EVAL (HIGH) 16235 (typically 45 minutes face-to-face)  [] RE-EVAL     [x] TD(21773) 2     [] IONTO  [x] NMR (13712) 1     [x] treatment progression. Pt educated on plan of care and HEP, if worsening symptoms to d/c that exercise. PLAN: See eval  [x] Continue per plan of care [] Alter current plan (see comments above)  [] Plan of care initiated [] Hold pending MD visit [] Discharge      Electronically signed by:  Roosevelt Shah, PT    Note: If patient does not return for scheduled/ recommended follow up visits, this note will serve as a discharge from care along with most recent update on progress.

## 2022-03-17 ENCOUNTER — HOSPITAL ENCOUNTER (OUTPATIENT)
Dept: PHYSICAL THERAPY | Age: 37
Setting detail: THERAPIES SERIES
Discharge: HOME | End: 2022-03-17
Payer: MEDICAID

## 2022-03-17 PROCEDURE — 97112 NEUROMUSCULAR REEDUCATION: CPT

## 2022-03-17 PROCEDURE — 97530 THERAPEUTIC ACTIVITIES: CPT

## 2022-03-17 PROCEDURE — 97110 THERAPEUTIC EXERCISES: CPT

## 2022-03-17 NOTE — FLOWSHEET NOTE
[address] [facility], [address] [place] [address] [address]  Phone: [phone]   Fax:     [phone]      Physical Therapy Treatment Note/ Progress Report:           Date:  3/17/2022    Patient Name:  Jennifer Augustin    :  1985  MRN: [id number]  Restrictions/Precautions:    Medical/Treatment Diagnosis Information:  · Diagnosis: M22.41, M22.42 (ICD-10-CM) - Chondromalacia of both patellae; S83.282D (ICD-10-CM) - Acute lateral meniscus tear of left knee, subsequent encounter  · Treatment Diagnosis: Bilateral knee pain, decreased ROM, flexibility, strength  Insurance/Certification information:  PT Insurance Information: Marjorie Smith  Physician Information:    Has the plan of care been signed (Y/N):        []  Yes  []  No     Date of Patient follow up with Physician:     Assessment Summary: Luzma Razo is a 39 y.o. male reporting to OP PT with c/c of bilateral knee pain and knee scopes bilaterally, left on 21 and right on 21. Pt is noted to have reduced ROM, flexibility and strength. Overall reduced functional mobility. Will progress as tolerated. Is this a Progress Report:     []  Yes  [x]  No        If Yes:  Date Range for reporting period:  Beginning   22  Ending 22    Progress report will be due (10 Rx or 30 days whichever is less): 3/8/78       Recertification will be due (POC Duration  / 90 days whichever is less): 3/4/22          Visit # Insurance Allowable Auth Required   In Person  30 []  Yes     []  No    Tele Health   []  Yes     []  No    Total 18             Functional Scale: LEFS 60%, 49%    Date assessed:        Latex Allergy:  [x]NO      []YES  Preferred Language for Healthcare:   [x]English       []other:      Pain level:  2/10     SUBJECTIVE:  Pt states knees are mildly sore. OBJECTIVE:   No edema, erythema, no calf tenderness.        RESTRICTIONS/PRECAUTIONS: None     Exercises/Interventions: HEP code: [id number]  Therapeutic Ex (97841) HEP 3/10/22 3/14/22 3/17/22   Warm-up       Rec bike  7', Lv 5 7', Lv 5 7', Lv 5          TABLE       Heel slides x      Strap gastroc stretch x      Hs stretch x      Quad set       SLR x      Bridge x      Bridge w/ march       Melvin stretch x  1' B    Quad stretch   1' B           SEATED                                                 STANDING       Wedge gastroc stretch  1' 1' 1'   Heel raises 1/2 roll  10x3 10x3 10x3   Toe raises 1/2 roll  10x3 10x3 10x3   HS stretch  1' 1' 1'   Anterior step ups  --  --   Anterior step downs  --  --   Lateral step ups  --  --   Resisted side steps  -- 10'x3 laps 10'x2 lap, GTB   Monster walks    10'x1 lap GTB   Knee extension machine    10x2, 40#   HS curl machine  X30, 50#  10x2, 60#   COREY Abduction  x20  B, 75# x20 B, 65# x20 B, 65#   COREY Extension  x20 B, 75# x20 B, 65# x20 B, 65#   COREY Flexion  x20 B, 60# x20 B, 65# x20 B, 65#   Leg Press DL  X15, 80# X15, 80# X15, 85#   Leg Press Eccentric  X10, 60# x15 B, 60# x15 B, 65#   Retrowalk CC  X10, 45# x15 B, 45# X10, 55#   BOSU balance  1' 1' 1'   BOSU squats   x15 x15   BOSU marches  X30  x30   BOSU step ups  x15 x15 B x15 B   Stool push SL  -- 20'x2 ea    Stool pull DL  -- 20'x2    Ladder  -- x10 x15                                  Manual (96684): Therapeutic Exercise and NMR EXR  [x] (08891) Provided verbal/tactile cueing for activities related to strengthening, flexibility, endurance, ROM for improvements in LE, proximal hip, and core control with self care, mobility, lifting, ambulation. [x] (26974) Provided verbal/tactile cueing for activities related to improving balance, coordination, kinesthetic sense, posture, motor skill, proprioception to assist with LE, proximal hip, and core control in self-care, mobility, lifting, ambulation and eccentric single leg control.      NMR and Therapeutic Activities:    [x] (96101 or 92680) Provided verbal/tactile cueing for activities related to improving balance, coordination, kinesthetic sense, posture, motor skill, proprioception and motor activation to allow for proper function of core, proximal hip and LE with self-care and ADLs and functional mobility.   [] (78478) Gait Re-education- Provided training and instruction to the patient for proper LE, core and proximal hip recruitment and positioning and eccentric body weight control with ambulation re-education including up and down stairs     Home Exercise Program:    [x] (15478) Reviewed/Progressed HEP activities related to strengthening, flexibility, endurance, ROM of core, proximal hip and LE for functional self-care, mobility, lifting and ambulation/stair navigation   [] (44901) Reviewed/Progressed HEP activities related to improving balance, coordination, kinesthetic sense, posture, motor skill, proprioception of core, proximal hip and LE for self-care, mobility, lifting, and ambulation/stair navigation      Manual Treatments:  PROM / STM / Oscillations-Mobs:  G-I, II, III, IV (PA's, Inf., Post.)  [x] (08359) Provided manual therapy to mobilize LE, proximal hip and/or LS spine soft tissue/joints for the purpose of modulating pain, promoting relaxation, increasing ROM, reducing/eliminating soft tissue swelling/inflammation/restriction, improving soft tissue extensibility and allowing for proper ROM for normal function with self-care, mobility, lifting and ambulation. Modalities:     [x] GAME READY (VASO)- for significant edema, swelling, pain control.   Right LE, no adverse skin reaction      Charges:  Timed Code Treatment Minutes: 55   Total Treatment Minutes:  55   BWC:  TE TIME:  NMR TIME:  MANUAL TIME:   TA  UNTIMED MINUTES:  Medicare Total:   35  10    10          [] EVAL (LOW) 53502 (typically 20 minutes face-to-face)  [] EVAL (MOD) 60056 (typically 30 minutes face-to-face)  [] EVAL (HIGH) 92136 (typically 45 minutes face-to-face)  [] RE-EVAL     [x] (98720) 2     [] IONTO  [x] [facility] (76979) 1     [x] VASO  [] Manual (77263) x     [] Dry Needle:  [x] TA 1      [] [facility] Traction (10908)  [] ES(attended) (44568)      [] ES (un) (65332):        GOALS:     Patient stated goal: Improve mobility, return to work      Therapist goals for Patient:   Short Term Goals: To be achieved in: 2 weeks  1. Independent in HEP and progression per patient tolerance, in order to prevent re-injury. [x]? Progressing: []? Met: []? Not Met: []? Adjusted      2. Patient will have a decrease in pain of NRPS to </=3/10 facilitate improvement in movement, function, and ADLs as indicated by Functional Deficits. [x]? Progressing: []? Met: []? Not Met: []? Adjusted      Long Term Goals: To be achieved in: 8 weeks  1. Disability index score of 30% or less for the LEFS to assist with reaching prior level of function. [x]? Progressing: []? Met: []? Not Met: []? Adjusted      2. Patient will demonstrate increased AROM to 0-130 to bilateral knees to allow for proper joint functioning as indicated by patients Functional Deficits. []? Progressing: []? Met: []? Not Met: []? Adjusted      3. Patient will demonstrate an increase in Strength to 5/5 to knee flex/ext allow for proper functional mobility as indicated by patients Functional Deficits. []? Progressing: []? Met: []? Not Met: []? Adjusted      4. Patient will return to functional walking activities with NRPS of </= 1/10. []? Progressing: []? Met: []? Not Met: []? Adjusted      5. Pt will be able to ascend/descend ladder with deviation to allow for return to work activities.  (patient specific functional goal)    []? Progressing: []? Met: []? Not Met: []? Adjusted                ASSESSMENT:  Patrick Dancer session well. Is progressing with weight, although I feel he should still be further along than he is. Patient received education on their current pathology and how their condition effects them with their functional activities.  Patient understood discussion and questions were answered. Patient understands their activity limitations and understands rational for treatment progression. Pt educated on plan of care and HEP, if worsening symptoms to d/c that exercise. PLAN: See eval  [x] Continue per plan of care [] Alter current plan (see comments above)  [] Plan of care initiated [] Hold pending MD visit [] Discharge      Electronically signed by:  Sha Zazueta, PT    Note: If patient does not return for scheduled/ recommended follow up visits, this note will serve as a discharge from care along with most recent update on progress.

## 2022-03-21 ENCOUNTER — HOSPITAL ENCOUNTER (OUTPATIENT)
Dept: PHYSICAL THERAPY | Age: 37
Setting detail: THERAPIES SERIES
Discharge: HOME | End: 2022-03-21
Payer: MEDICAID

## 2022-03-21 PROCEDURE — 97110 THERAPEUTIC EXERCISES: CPT

## 2022-03-21 PROCEDURE — 97530 THERAPEUTIC ACTIVITIES: CPT

## 2022-03-21 PROCEDURE — 97112 NEUROMUSCULAR REEDUCATION: CPT

## 2022-03-21 NOTE — PROGRESS NOTES
attached document and return it to our office or note changes to established plan below  [] Follow treatment plan as above [] Discontinue physical therapy  [] Change plan to:                                 __________________________________________________    Physician Signature:____________________________________ Date:____________  By signing above, therapists plan is approved by physician    If you have any questions or concerns, please don't hesitate to call.   Thank you for your referral.

## 2022-03-21 NOTE — FLOWSHEET NOTE
[address] [facility], [address] [place] [address] [address]  Phone: [phone]   Fax:     [phone]      Physical Therapy Treatment Note/ Progress Report:           Date:  3/21/2022    Patient Name:  Ricky Genao    :  1985  MRN: [id number]  Restrictions/Precautions:    Medical/Treatment Diagnosis Information:  · Diagnosis: M22.41, M22.42 (ICD-10-CM) - Chondromalacia of both patellae; S83.282D (ICD-10-CM) - Acute lateral meniscus tear of left knee, subsequent encounter  · Treatment Diagnosis: Bilateral knee pain, decreased ROM, flexibility, strength  Insurance/Certification information:  PT Insurance Information: Emily Patricia  Physician Information:    Has the plan of care been signed (Y/N):        []  Yes  []  No     Date of Patient follow up with Physician:     Assessment Summary: Jacquie Castillo is a 39 y.o. male reporting to OP PT with c/c of bilateral knee pain and knee scopes bilaterally, left on 21 and right on 21. Pt is noted to have reduced ROM, flexibility and strength. Overall reduced functional mobility. Will progress as tolerated. Is this a Progress Report:     []  Yes  [x]  No        If Yes:  Date Range for reporting period:  Beginning   22  Ending 22    Progress report will be due (10 Rx or 30 days whichever is less): 17       Recertification will be due (POC Duration  / 90 days whichever is less): 3/4/22          Visit # Insurance Allowable Auth Required   In Person  30 []  Yes     []  No    Tele Health   []  Yes     []  No    Total 19             Functional Scale: LEFS 60%, 49% ,    Date assessed:        Latex Allergy:  [x]NO      []YES  Preferred Language for Healthcare:   [x]English       []other:      Pain level:  2/10     SUBJECTIVE:  Pt states knees are sore but pain is better than it was.      OBJECTIVE:         RESTRICTIONS/PRECAUTIONS: None     Exercises/Interventions: HEP code: [id number]  Therapeutic Ex (32238) HEP 3/14/22 3/21/22    Warm-up       Rec bike  7', Lv 5 7', Lv 5           TABLE       Heel slides x      Strap gastroc stretch x      Hs stretch x      Quad set       SLR x      Bridge x      Bridge w/ march       Melvin stretch x 1' B     Quad stretch  1' B            SEATED                                                 STANDING       Wedge gastroc stretch  1' 1'    Heel raises 1/2 roll  10x3 10x3    Toe raises 1/2 roll  10x3 10x3    HS stretch  1' 1'    Anterior step ups   --    Anterior step downs   --    Lateral step ups   --    Resisted side steps  10'x3 laps 10'x2 lap, GTB    Monster walks   10'x1 lap GTB    Knee extension machine   10x2, 40#    HS curl machine   10x2, 60#    COREY Abduction  x20 B, 65# x20 B, 65#    COREY Extension  x20 B, 65# x20 B, 65#    COREY Flexion  x20 B, 65# x20 B, 65#    Leg Press DL  X15, 80# X15, 85#    Leg Press Eccentric  x15 B, 60# x15 B, 65#    Retrowalk CC  x15 B, 45# X10, 55#    BOSU balance  1' 1'    BOSU squats  x15 x15    BOSU marches   x30    BOSU step ups  x15 B x15 B    Stool push SL  20'x2 ea     Stool pull DL  20'x2     Ladder  x10 x15                                   Manual (62577): Therapeutic Exercise and NMR EXR  [x] (21552) Provided verbal/tactile cueing for activities related to strengthening, flexibility, endurance, ROM for improvements in LE, proximal hip, and core control with self care, mobility, lifting, ambulation. [x] (56446) Provided verbal/tactile cueing for activities related to improving balance, coordination, kinesthetic sense, posture, motor skill, proprioception to assist with LE, proximal hip, and core control in self-care, mobility, lifting, ambulation and eccentric single leg control.      NMR and Therapeutic Activities:    [x] (31527 or 50758) Provided verbal/tactile cueing for activities related to improving balance, coordination, kinesthetic sense, posture, motor skill, proprioception and motor activation to allow for proper function of core, proximal hip and LE with self-care and ADLs and functional mobility.   [] (71676) Gait Re-education- Provided training and instruction to the patient for proper LE, core and proximal hip recruitment and positioning and eccentric body weight control with ambulation re-education including up and down stairs     Home Exercise Program:    [x] (56565) Reviewed/Progressed HEP activities related to strengthening, flexibility, endurance, ROM of core, proximal hip and LE for functional self-care, mobility, lifting and ambulation/stair navigation   [] (38391) Reviewed/Progressed HEP activities related to improving balance, coordination, kinesthetic sense, posture, motor skill, proprioception of core, proximal hip and LE for self-care, mobility, lifting, and ambulation/stair navigation      Manual Treatments:  PROM / STM / Oscillations-Mobs:  G-I, II, III, IV (PA's, Inf., Post.)  [x] (94710) Provided manual therapy to mobilize LE, proximal hip and/or LS spine soft tissue/joints for the purpose of modulating pain, promoting relaxation, increasing ROM, reducing/eliminating soft tissue swelling/inflammation/restriction, improving soft tissue extensibility and allowing for proper ROM for normal function with self-care, mobility, lifting and ambulation. Modalities:     [x] GAME READY (VASO)- for significant edema, swelling, pain control.   Right LE, no adverse skin reaction      Charges:  Timed Code Treatment Minutes: 55   Total Treatment Minutes:  55   BWC:  TE TIME:  NMR TIME:  MANUAL TIME:   TA  UNTIMED MINUTES:  Medicare Total:   35  10    10          [] EVAL (LOW) 77956 (typically 20 minutes face-to-face)  [] EVAL (MOD) 75079 (typically 30 minutes face-to-face)  [] EVAL (HIGH) 86414 (typically 45 minutes face-to-face)  [] RE-EVAL     [x] BK(40506) 2     [] IONTO  [x] NMR (71042) 1     [x] VASO  [] Manual (87041) x     [] Dry Needle:  [x] TA 1      [] Mech Traction (93654)  [] ES(attended) (38749)      [] ES (un) (34252):        GOALS:     Patient stated goal: Improve mobility, return to work      Therapist goals for Patient:   Short Term Goals: To be achieved in: 2 weeks  1. Independent in HEP and progression per patient tolerance, in order to prevent re-injury. []? Progressing: [x]? Met: []? Not Met: []? Adjusted      2. Patient will have a decrease in pain of NRPS to </=3/10 facilitate improvement in movement, function, and ADLs as indicated by Functional Deficits. [x]? Progressing: [x]? Met: []? Not Met: []? Adjusted      Long Term Goals: To be achieved in: 8 weeks  1. Disability index score of 30% or less for the LEFS to assist with reaching prior level of function. [x]? Progressing: []? Met: []? Not Met: []? Adjusted      2. Patient will demonstrate increased AROM to 0-130 to bilateral knees to allow for proper joint functioning as indicated by patients Functional Deficits. []? Progressing: [x]? Met: []? Not Met: []? Adjusted      3. Patient will demonstrate an increase in Strength to 5/5 to knee flex/ext allow for proper functional mobility as indicated by patients Functional Deficits. []? Progressing: [x]? Met: []? Not Met: []? Adjusted      4. Patient will return to functional walking activities with NRPS of </= 1/10. [x]? Progressing: []? Met: []? Not Met: []? Adjusted      5. Pt will be able to ascend/descend ladder with deviation to allow for return to work activities.  (patient specific functional goal)    []? Progressing: [x]? Met: []? Not Met: []? Adjusted                ASSESSMENT:  Estela Rosario has been seen in PT for 19 visits for bilateral knee pain. Pt has responded fair overall to PT session which have been addressing progressive functional strengthening, balance and flexibility. Pt is reporting pain levels are improving some. He is progressing with weight but still lower than I would expect at this stage. There is some self limiting behavior I feel.  Will continue trying to work through progressive loading and more functional loading to help facilitate return to work. Patient received education on their current pathology and how their condition effects them with their functional activities. Patient understood discussion and questions were answered. Patient understands their activity limitations and understands rational for treatment progression. Pt educated on plan of care and HEP, if worsening symptoms to d/c that exercise. PLAN: See eval  [x] Continue per plan of care [] Alter current plan (see comments above)  [] Plan of care initiated [] Hold pending MD visit [] Discharge      Electronically signed by:  Rafiq Jeffrey PT    Note: If patient does not return for scheduled/ recommended follow up visits, this note will serve as a discharge from care along with most recent update on progress.

## 2022-03-28 ENCOUNTER — HOSPITAL ENCOUNTER (OUTPATIENT)
Dept: PHYSICAL THERAPY | Age: 37
Setting detail: THERAPIES SERIES
Discharge: HOME | End: 2022-03-28
Payer: MEDICAID

## 2022-03-28 ENCOUNTER — OFFICE VISIT (OUTPATIENT)
Dept: ORTHOPEDIC SURGERY | Age: 37
End: 2022-03-28
Payer: MEDICAID

## 2022-03-28 DIAGNOSIS — M22.41 CHONDROMALACIA OF PATELLA, RIGHT: Primary | ICD-10-CM

## 2022-03-28 DIAGNOSIS — M22.42 CHONDROMALACIA OF BOTH PATELLAE: ICD-10-CM

## 2022-03-28 DIAGNOSIS — M22.41 CHONDROMALACIA OF BOTH PATELLAE: ICD-10-CM

## 2022-03-28 PROCEDURE — 97530 THERAPEUTIC ACTIVITIES: CPT

## 2022-03-28 PROCEDURE — 99212 OFFICE O/P EST SF 10 MIN: CPT | Performed by: ORTHOPAEDIC SURGERY

## 2022-03-28 PROCEDURE — 97112 NEUROMUSCULAR REEDUCATION: CPT

## 2022-03-28 PROCEDURE — G8427 DOCREV CUR MEDS BY ELIG CLIN: HCPCS | Performed by: ORTHOPAEDIC SURGERY

## 2022-03-28 PROCEDURE — 4004F PT TOBACCO SCREEN RCVD TLK: CPT | Performed by: ORTHOPAEDIC SURGERY

## 2022-03-28 PROCEDURE — G8484 FLU IMMUNIZE NO ADMIN: HCPCS | Performed by: ORTHOPAEDIC SURGERY

## 2022-03-28 PROCEDURE — G8417 CALC BMI ABV UP PARAM F/U: HCPCS | Performed by: ORTHOPAEDIC SURGERY

## 2022-03-28 PROCEDURE — 97110 THERAPEUTIC EXERCISES: CPT

## 2022-03-28 NOTE — FLOWSHEET NOTE
[address] [facility], [address] [place] [address] [address]  Phone: [phone]   Fax:     [phone]      Physical Therapy Treatment Note/ Progress Report:           Date:  3/28/2022    Patient Name:  Emelia Carlin    :  1985  MRN: [id number]  Restrictions/Precautions:    Medical/Treatment Diagnosis Information:  · Diagnosis: M22.41, M22.42 (ICD-10-CM) - Chondromalacia of both patellae; S83.282D (ICD-10-CM) - Acute lateral meniscus tear of left knee, subsequent encounter  · Treatment Diagnosis: Bilateral knee pain, decreased ROM, flexibility, strength  Insurance/Certification information:  PT Insurance Information: [facility]  Physician Information:    Has the plan of care been signed (Y/N):        []  Yes  []  No     Date of Patient follow up with Physician:     Assessment Summary: Antonina Marrero is a 39 y.o. male reporting to OP PT with c/c of bilateral knee pain and knee scopes bilaterally, left on 21 and right on 21. Pt is noted to have reduced ROM, flexibility and strength. Overall reduced functional mobility. Will progress as tolerated.        Is this a Progress Report:     []  Yes  [x]  No        If Yes:  Date Range for reporting period:  Beginning   22  Ending 22    Progress report will be due (10 Rx or 30 days whichever is less): 3/4/94       Recertification will be due (POC Duration  / 90 days whichever is less): 3/4/22          Visit # Insurance Allowable Auth Required   In Person  30 []  Yes     []  No    Tele Health   []  Yes     []  No    Total 20             Functional Scale: LEFS 60%, 49% ,    Date assessed:        Latex Allergy:  [x]NO      []YES  Preferred Language for Healthcare:   [x]English       []other:      Pain level:  2/10     SUBJECTIVE:  Pt states knees a re a little sore    OBJECTIVE:         RESTRICTIONS/PRECAUTIONS: None     Exercises/Interventions: HEP code: [id number]  Therapeutic Ex (06319) HEP 3/14/22 3/21/22 3/28/22   Warm-up       Rec bike  7', Lv 5 7', Lv 5 7', Lv 5          TABLE       Heel slides x      Strap gastroc stretch x      Hs stretch x      Quad set       SLR x      Bridge x      Bridge w/ march       Melvin stretch x 1' B     Quad stretch  1' B            SEATED                                                 STANDING       Wedge gastroc stretch  1' 1' 1'   Heel raises 1/2 roll  10x3 10x3 10x3   Toe raises 1/2 roll  10x3 10x3 10x3   HS stretch  1' 1' 1'   Anterior step ups   -- --   Anterior step downs   -- --   Lateral step ups   -- --   Resisted side steps  10'x3 laps 10'x2 lap, GTB --   Monster walks   10'x1 lap GTB --   Knee extension machine   10x2, 40# X30, 40#   HS curl machine   10x2, 60# X30, 50#   COREY Abduction  x20 B, 65# x20 B, 65# x20 B, 75#   COREY Extension  x20 B, 65# x20 B, 65# x20 B, 74#   COREY Flexion  x20 B, 65# x20 B, 65# x20 B, 75#   Leg Press DL  X15, 80# X15, 85# X15, 90#   Leg Press Eccentric  x15 B, 60# x15 B, 65# X15, 70#   Retrowalk CC  x15 B, 45# X10, 55# X10, 55#   BOSU balance  1' 1' 1'   BOSU squats  x15 x15 10x2   BOSU marches   x30 --   BOSU step ups  x15 B x15 B x15 B   Stool push SL  20'x2 ea  20'x3   Stool pull DL  20'x2  20\"x4   Ladder  x10 x15    Steamboats    x20 ea, BTB                           Manual (49737): Therapeutic Exercise and NMR EXR  [x] (95254) Provided verbal/tactile cueing for activities related to strengthening, flexibility, endurance, ROM for improvements in LE, proximal hip, and core control with self care, mobility, lifting, ambulation. [x] (57331) Provided verbal/tactile cueing for activities related to improving balance, coordination, kinesthetic sense, posture, motor skill, proprioception to assist with LE, proximal hip, and core control in self-care, mobility, lifting, ambulation and eccentric single leg control.      NMR and Therapeutic Activities:    [x] (47661 or 44569) Provided verbal/tactile cueing for activities related to improving balance, coordination, kinesthetic sense, posture, motor skill, proprioception and motor activation to allow for proper function of core, proximal hip and LE with self-care and ADLs and functional mobility.   [] (26105) Gait Re-education- Provided training and instruction to the patient for proper LE, core and proximal hip recruitment and positioning and eccentric body weight control with ambulation re-education including up and down stairs     Home Exercise Program:    [x] (29605) Reviewed/Progressed HEP activities related to strengthening, flexibility, endurance, ROM of core, proximal hip and LE for functional self-care, mobility, lifting and ambulation/stair navigation   [] (67043) Reviewed/Progressed HEP activities related to improving balance, coordination, kinesthetic sense, posture, motor skill, proprioception of core, proximal hip and LE for self-care, mobility, lifting, and ambulation/stair navigation      Manual Treatments:  PROM / STM / Oscillations-Mobs:  G-I, II, III, IV (PA's, Inf., Post.)  [x] (71694) Provided manual therapy to mobilize LE, proximal hip and/or LS spine soft tissue/joints for the purpose of modulating pain, promoting relaxation, increasing ROM, reducing/eliminating soft tissue swelling/inflammation/restriction, improving soft tissue extensibility and allowing for proper ROM for normal function with self-care, mobility, lifting and ambulation. Modalities:     [x] GAME READY (VASO)- for significant edema, swelling, pain control.   Right LE, no adverse skin reaction      Charges:  Timed Code Treatment Minutes: 55   Total Treatment Minutes:  55   BWC:  TE TIME:  NMR TIME:  MANUAL TIME:   TA  UNTIMED MINUTES:  Medicare Total:   35  10    10          [] EVAL (LOW) 72991 (typically 20 minutes face-to-face)  [] EVAL (MOD) 42889 (typically 30 minutes face-to-face)  [] EVAL (HIGH) 33175 (typically 45 minutes face-to-face)  [] RE-EVAL     [x] WL(74256) 2     [] IONTO  [x] NMR (88733) 1     [x] VASO  [] Manual (66599) x     [] Dry Needle:  [x] TA 1      [] [facility] Traction (90641)  [] ES(attended) (46386)      [] ES (un) (82067):        GOALS:     Patient stated goal: Improve mobility, return to work      Therapist goals for Patient:   Short Term Goals: To be achieved in: 2 weeks  1. Independent in HEP and progression per patient tolerance, in order to prevent re-injury. []? Progressing: [x]? Met: []? Not Met: []? Adjusted      2. Patient will have a decrease in pain of NRPS to </=3/10 facilitate improvement in movement, function, and ADLs as indicated by Functional Deficits. [x]? Progressing: [x]? Met: []? Not Met: []? Adjusted      Long Term Goals: To be achieved in: 8 weeks  1. Disability index score of 30% or less for the LEFS to assist with reaching prior level of function. [x]? Progressing: []? Met: []? Not Met: []? Adjusted      2. Patient will demonstrate increased AROM to 0-130 to bilateral knees to allow for proper joint functioning as indicated by patients Functional Deficits. []? Progressing: [x]? Met: []? Not Met: []? Adjusted      3. Patient will demonstrate an increase in Strength to 5/5 to knee flex/ext allow for proper functional mobility as indicated by patients Functional Deficits. []? Progressing: [x]? Met: []? Not Met: []? Adjusted      4. Patient will return to functional walking activities with NRPS of </= 1/10. [x]? Progressing: []? Met: []? Not Met: []? Adjusted      5. Pt will be able to ascend/descend ladder with deviation to allow for return to work activities.  (patient specific functional goal)    []? Progressing: [x]? Met: []? Not Met: []? Adjusted                ASSESSMENT:  [place] session well. Able to make some light progressions with weight. Discussion today about needing to get closer to full body weight in a deeper squat for return to work.          Patient received education on their current pathology and how their condition effects them with their functional activities. Patient understood discussion and questions were answered. Patient understands their activity limitations and understands rational for treatment progression. Pt educated on plan of care and HEP, if worsening symptoms to d/c that exercise. PLAN: See eval  [x] Continue per plan of care [] Alter current plan (see comments above)  [] Plan of care initiated [] Hold pending MD visit [] Discharge      Electronically signed by:  Miroslava Valle PT    Note: If patient does not return for scheduled/ recommended follow up visits, this note will serve as a discharge from care along with most recent update on progress.

## 2022-03-28 NOTE — PROGRESS NOTES
He presents today for evaluation of both knees. I did review physical therapy notes and his therapist feels he is improving with increased weightbearing and strength but feels he needs some additional intervention and rehabilitation prior to returning to full unrestricted duty. In discussion with him, he seems more motivated to return to work and would feel that 2 months may be reasonable at that time to keep him off. He should return in 4 weeks for recheck and status report. His knee exam is unchanged from prior visits and does seem improved. He is going to try some functional things at home to see what his tolerance may be.

## 2022-03-31 ENCOUNTER — HOSPITAL ENCOUNTER (OUTPATIENT)
Dept: PHYSICAL THERAPY | Age: 37
Setting detail: THERAPIES SERIES
Discharge: HOME | End: 2022-03-31
Payer: MEDICAID

## 2022-03-31 PROCEDURE — 97112 NEUROMUSCULAR REEDUCATION: CPT

## 2022-03-31 PROCEDURE — 97110 THERAPEUTIC EXERCISES: CPT

## 2022-03-31 PROCEDURE — 97530 THERAPEUTIC ACTIVITIES: CPT

## 2022-03-31 NOTE — FLOWSHEET NOTE
[address] [place] [address] [facility] [address], [address] [place] [address] [address]  Phone: [phone]   Fax:     [phone]      Physical Therapy Treatment Note/ Progress Report:           Date:  3/31/2022    Patient Name:  Kasey Durand    :  1985  MRN: [id number]  Restrictions/Precautions:    Medical/Treatment Diagnosis Information:  · Diagnosis: M22.41, M22.42 (ICD-10-CM) - Chondromalacia of both patellae; S83.282D (ICD-10-CM) - Acute lateral meniscus tear of left knee, subsequent encounter  · Treatment Diagnosis: Bilateral knee pain, decreased ROM, flexibility, strength  Insurance/Certification information:  PT Insurance Information: [place]  Physician Information:    Has the plan of care been signed (Y/N):        []  Yes  []  No     Date of Patient follow up with Physician:     Assessment Summary: Deepthi Ortiz is a 39 y.o. male reporting to OP PT with c/c of bilateral knee pain and knee scopes bilaterally, left on 21 and right on 21. Pt is noted to have reduced ROM, flexibility and strength. Overall reduced functional mobility. Will progress as tolerated. Is this a Progress Report:     []  Yes  [x]  No        If Yes:  Date Range for reporting period:  Beginning   22  Ending 22    Progress report will be due (10 Rx or 30 days whichever is less): 77       Recertification will be due (POC Duration  / 90 days whichever is less): 3/4/22          Visit # Insurance Allowable Auth Required   In Person  30 []  Yes     []  No    Tele Health   []  Yes     []  No    Total 21             Functional Scale: LEFS 60%, 49% ,    Date assessed:        Latex Allergy:  [x]NO      []YES  Preferred Language for Healthcare:   [x]English       []other:      Pain level:  3/10     SUBJECTIVE:  Pt states knees are sore, did a lot of work on camper.      OBJECTIVE:         RESTRICTIONS/PRECAUTIONS: None     Exercises/Interventions: HEP code: [id number]  Therapeutic Ex (02426) HEP 3/21/22 3/31/22    Warm-up       Rec bike  7', Lv 5 7', Lv 5           TABLE       Heel slides x      Strap gastroc stretch x      Hs stretch x      Quad set       SLR x      Bridge x      Bridge w/ march       Melvin stretch x      Quad stretch              SEATED                                                 STANDING       Wedge gastroc stretch  1' 1'    Heel raises 1/2 roll  10x3 10x3    Toe raises 1/2 roll  10x3 10x3    HS stretch  1' 1'    Anterior step ups  -- --    Anterior step downs  -- --    Lateral step ups  -- --    Resisted side steps  10'x2 lap, GTB --    Monster walks  10'x1 lap GTB --    Knee extension machine  10x2, 40# X30, 40#    HS curl machine  10x2, 60# X30, 50#    COREY Abduction  x20 B, 65# x20 B, 75#    COREY Extension  x20 B, 65# x20 B, 74#    COREY Flexion  x20 B, 65# x20 B, 75#    Leg Press DL  X15, 85# X15, 90#    Leg Press Eccentric  x15 B, 65# X15, 70#    Retrowalk CC  X10, 55# X10, 55#    BOSU balance  1' 1'    BOSU squats  x15 10x2    BOSU marches  x30 --    BOSU step ups  x15 B x15 B    Stool push SL   20'x3    Stool pull DL   20\"x4    Ladder  x15     Steamboats   x20 ea, BTB                            Manual (49985): Therapeutic Exercise and NMR EXR  [x] (96739) Provided verbal/tactile cueing for activities related to strengthening, flexibility, endurance, ROM for improvements in LE, proximal hip, and core control with self care, mobility, lifting, ambulation. [x] (47875) Provided verbal/tactile cueing for activities related to improving balance, coordination, kinesthetic sense, posture, motor skill, proprioception to assist with LE, proximal hip, and core control in self-care, mobility, lifting, ambulation and eccentric single leg control.      NMR and Therapeutic Activities:    [x] (70654 or 18302) Provided verbal/tactile cueing for activities related to improving balance, coordination, kinesthetic sense, posture, motor skill, proprioception and motor activation to allow for proper function of core, proximal hip and LE with self-care and ADLs and functional mobility.   [] (49336) Gait Re-education- Provided training and instruction to the patient for proper LE, core and proximal hip recruitment and positioning and eccentric body weight control with ambulation re-education including up and down stairs     Home Exercise Program:    [x] (50779) Reviewed/Progressed HEP activities related to strengthening, flexibility, endurance, ROM of core, proximal hip and LE for functional self-care, mobility, lifting and ambulation/stair navigation   [] (87922) Reviewed/Progressed HEP activities related to improving balance, coordination, kinesthetic sense, posture, motor skill, proprioception of core, proximal hip and LE for self-care, mobility, lifting, and ambulation/stair navigation      Manual Treatments:  PROM / STM / Oscillations-Mobs:  G-I, II, III, IV (PA's, Inf., Post.)  [x] (11349) Provided manual therapy to mobilize LE, proximal hip and/or LS spine soft tissue/joints for the purpose of modulating pain, promoting relaxation, increasing ROM, reducing/eliminating soft tissue swelling/inflammation/restriction, improving soft tissue extensibility and allowing for proper ROM for normal function with self-care, mobility, lifting and ambulation. Modalities:     [x] GAME READY (VASO)- for significant edema, swelling, pain control.   Right LE, no adverse skin reaction      Charges:  Timed Code Treatment Minutes: 55   Total Treatment Minutes:  55   BWC:  TE TIME:  NMR TIME:  MANUAL TIME:   TA  UNTIMED MINUTES:  Medicare Total:   35  10    10          [] EVAL (LOW) 32343 (typically 20 minutes face-to-face)  [] EVAL (MOD) 44489 (typically 30 minutes face-to-face)  [] EVAL (HIGH) 34454 (typically 45 minutes face-to-face)  [] RE-EVAL     [x] TY(60134) 2     [] IONTO  [x] NMR (56853) 1     [x] VASO  [] Manual (09344) x     [] Dry Needle:  [x] TA 1      [] Mech Traction (29683)  [] ES(attended) (08487)      [] ES (un) (31972):        GOALS:     Patient stated goal: Improve mobility, return to work      Therapist goals for Patient:   Short Term Goals: To be achieved in: 2 weeks  1. Independent in HEP and progression per patient tolerance, in order to prevent re-injury. []? Progressing: [x]? Met: []? Not Met: []? Adjusted      2. Patient will have a decrease in pain of NRPS to </=3/10 facilitate improvement in movement, function, and ADLs as indicated by Functional Deficits. [x]? Progressing: [x]? Met: []? Not Met: []? Adjusted      Long Term Goals: To be achieved in: 8 weeks  1. Disability index score of 30% or less for the LEFS to assist with reaching prior level of function. [x]? Progressing: []? Met: []? Not Met: []? Adjusted      2. Patient will demonstrate increased AROM to 0-130 to bilateral knees to allow for proper joint functioning as indicated by patients Functional Deficits. []? Progressing: [x]? Met: []? Not Met: []? Adjusted      3. Patient will demonstrate an increase in Strength to 5/5 to knee flex/ext allow for proper functional mobility as indicated by patients Functional Deficits. []? Progressing: [x]? Met: []? Not Met: []? Adjusted      4. Patient will return to functional walking activities with NRPS of </= 1/10. [x]? Progressing: []? Met: []? Not Met: []? Adjusted      5. Pt will be able to ascend/descend ladder with deviation to allow for return to work activities.  (patient specific functional goal)    []? Progressing: [x]? Met: []? Not Met: []? Adjusted                ASSESSMENT:  [place] session well. Patient received education on their current pathology and how their condition effects them with their functional activities. Patient understood discussion and questions were answered. Patient understands their activity limitations and understands rational for treatment progression.   Pt educated on plan of care and HEP, if worsening symptoms to d/c that exercise. PLAN: See eval  [x] Continue per plan of care [] Alter current plan (see comments above)  [] Plan of care initiated [] Hold pending MD visit [] Discharge      Electronically signed by:  Mar Aguilar PT    Note: If patient does not return for scheduled/ recommended follow up visits, this note will serve as a discharge from care along with most recent update on progress.

## 2022-04-04 ENCOUNTER — HOSPITAL ENCOUNTER (OUTPATIENT)
Dept: PHYSICAL THERAPY | Age: 37
Setting detail: THERAPIES SERIES
Discharge: HOME | End: 2022-04-04
Payer: MEDICAID

## 2022-04-04 PROCEDURE — 97112 NEUROMUSCULAR REEDUCATION: CPT

## 2022-04-04 PROCEDURE — 97530 THERAPEUTIC ACTIVITIES: CPT

## 2022-04-04 PROCEDURE — 97110 THERAPEUTIC EXERCISES: CPT

## 2022-04-04 NOTE — FLOWSHEET NOTE
[facility], [address] [place] [address] [address]  Phone: [phone]   Fax:     [phone]      Physical Therapy Treatment Note/ Progress Report:           Date:  2022    Patient Name:  Dee Gonzales    :  1985  MRN: [id number]  Restrictions/Precautions:    Medical/Treatment Diagnosis Information:  · Diagnosis: M22.41, M22.42 (ICD-10-CM) - Chondromalacia of both patellae; S83.282D (ICD-10-CM) - Acute lateral meniscus tear of left knee, subsequent encounter  · Treatment Diagnosis: Bilateral knee pain, decreased ROM, flexibility, strength  Insurance/Certification information:  PT Insurance Information: Nelda Kenny  Physician Information:    Has the plan of care been signed (Y/N):        []  Yes  []  No     Date of Patient follow up with Physician:     Assessment Summary: Savanna Garcia is a 39 y.o. male reporting to OP PT with c/c of bilateral knee pain and knee scopes bilaterally, left on 21 and right on 21. Pt is noted to have reduced ROM, flexibility and strength. Overall reduced functional mobility. Will progress as tolerated.        Is this a Progress Report:     []  Yes  [x]  No        If Yes:  Date Range for reporting period:  Beginning   22  Ending 22    Progress report will be due (10 Rx or 30 days whichever is less): 40       Recertification will be due (POC Duration  / 90 days whichever is less): 3/4/22          Visit # Insurance Allowable Auth Required   In Person  30 []  Yes     []  No    Tele Health   []  Yes     []  No    Total 22             Functional Scale: LEFS 60%, 49% ,    Date assessed:        Latex Allergy:  [x]NO      []YES  Preferred Language for Healthcare:   [x]English       []other:      Pain level:  2-3/10     SUBJECTIVE:  Pt states knees are a little sore    OBJECTIVE:         RESTRICTIONS/PRECAUTIONS: None     Exercises/Interventions: HEP code: [id number]  Therapeutic Ex (41031) HEP 3/21/22 4/4//22    Warm-up       Rec bike  7', Lv 5 7', Lv 6           TABLE       Heel slides x      Strap gastroc stretch x      Hs stretch x      Quad set       SLR x      Bridge x      Bridge w/ march       Melvin stretch x      Quad stretch              SEATED                                                 STANDING       Wedge gastroc stretch  1' 1'    Heel raises 1/2 roll  10x3 10x3    Toe raises 1/2 roll  10x3 10x3    HS stretch  1' 1'    Anterior step ups  -- --    Anterior step downs  -- --    Lateral step ups  -- --    Resisted side steps  10'x2 lap, GTB --    Monster walks  10'x1 lap GTB --    Knee extension machine  10x2, 40# X30, 40#    HS curl machine  10x2, 60# X30, 50#    COREY Abduction  x20 B, 65# x20 B, 75#    COREY Extension  x20 B, 65# x20 B, 74#    COREY Flexion  x20 B, 65# x20 B, 75#    Leg Press DL  X15, 85# X15, 100#    Leg Press Eccentric  x15 B, 65# X15, 70#    Retrowalk CC  X10, 55# X15, 45#    BOSU balance  1' 1'    BOSU squats  x15 10x2    BOSU marches  x30 --    BOSU step ups  x15 B x15 B    Stool push SL   20'x3    Stool pull DL   20\"x4    Ladder  x15 x15    Steamboats   x20 RADHA ordaz                            Manual (19487): Therapeutic Exercise and NMR EXR  [x] (93571) Provided verbal/tactile cueing for activities related to strengthening, flexibility, endurance, ROM for improvements in LE, proximal hip, and core control with self care, mobility, lifting, ambulation. [x] (60241) Provided verbal/tactile cueing for activities related to improving balance, coordination, kinesthetic sense, posture, motor skill, proprioception to assist with LE, proximal hip, and core control in self-care, mobility, lifting, ambulation and eccentric single leg control.      NMR and Therapeutic Activities:    [x] (02011 or 65636) Provided verbal/tactile cueing for activities related to improving balance, coordination, kinesthetic sense, posture, motor skill, proprioception and motor activation to allow for proper function of core, proximal hip and LE with self-care and ADLs and functional mobility.   [] (46655) Gait Re-education- Provided training and instruction to the patient for proper LE, core and proximal hip recruitment and positioning and eccentric body weight control with ambulation re-education including up and down stairs     Home Exercise Program:    [x] (68223) Reviewed/Progressed HEP activities related to strengthening, flexibility, endurance, ROM of core, proximal hip and LE for functional self-care, mobility, lifting and ambulation/stair navigation   [] (75250) Reviewed/Progressed HEP activities related to improving balance, coordination, kinesthetic sense, posture, motor skill, proprioception of core, proximal hip and LE for self-care, mobility, lifting, and ambulation/stair navigation      Manual Treatments:  PROM / STM / Oscillations-Mobs:  G-I, II, III, IV (PA's, Inf., Post.)  [x] (66497) Provided manual therapy to mobilize LE, proximal hip and/or LS spine soft tissue/joints for the purpose of modulating pain, promoting relaxation, increasing ROM, reducing/eliminating soft tissue swelling/inflammation/restriction, improving soft tissue extensibility and allowing for proper ROM for normal function with self-care, mobility, lifting and ambulation. Modalities:     [x] GAME READY (VASO)- for significant edema, swelling, pain control.   Right LE, no adverse skin reaction      Charges:  Timed Code Treatment Minutes: 55   Total Treatment Minutes:  55   BWC:  TE TIME:  NMR TIME:  MANUAL TIME:   TA  UNTIMED MINUTES:  Medicare Total:   35  10    10          [] EVAL (LOW) 36462 (typically 20 minutes face-to-face)  [] EVAL (MOD) 77055 (typically 30 minutes face-to-face)  [] EVAL (HIGH) 42218 (typically 45 minutes face-to-face)  [] RE-EVAL     [x] LC(01749) 2     [] IONTO  [x] NMR (01994) 1     [x] VASO  [] Manual (29114) x     [] Dry Needle:  [x] TA 1      [] Mech Traction (47142)  [] ES(attended) (45281) [] ES (un) (09054):        GOALS:     Patient stated goal: Improve mobility, return to work      Therapist goals for Patient:   Short Term Goals: To be achieved in: 2 weeks  1. Independent in HEP and progression per patient tolerance, in order to prevent re-injury. []? Progressing: [x]? Met: []? Not Met: []? Adjusted      2. Patient will have a decrease in pain of NRPS to </=3/10 facilitate improvement in movement, function, and ADLs as indicated by Functional Deficits. [x]? Progressing: [x]? Met: []? Not Met: []? Adjusted      Long Term Goals: To be achieved in: 8 weeks  1. Disability index score of 30% or less for the LEFS to assist with reaching prior level of function. [x]? Progressing: []? Met: []? Not Met: []? Adjusted      2. Patient will demonstrate increased AROM to 0-130 to bilateral knees to allow for proper joint functioning as indicated by patients Functional Deficits. []? Progressing: [x]? Met: []? Not Met: []? Adjusted      3. Patient will demonstrate an increase in Strength to 5/5 to knee flex/ext allow for proper functional mobility as indicated by patients Functional Deficits. []? Progressing: [x]? Met: []? Not Met: []? Adjusted      4. Patient will return to functional walking activities with NRPS of </= 1/10. [x]? Progressing: []? Met: []? Not Met: []? Adjusted      5. Pt will be able to ascend/descend ladder with deviation to allow for return to work activities.  (patient specific functional goal)    []? Progressing: [x]? Met: []? Not Met: []? Adjusted                ASSESSMENT:  [place] session well. Patient received education on their current pathology and how their condition effects them with their functional activities. Patient understood discussion and questions were answered. Patient understands their activity limitations and understands rational for treatment progression. Pt educated on plan of care and HEP, if worsening symptoms to d/c that exercise. PLAN: See eval  [x] Continue per plan of care [] Alter current plan (see comments above)  [] Plan of care initiated [] Hold pending MD visit [] Discharge      Electronically signed by:  Chris Kidney, PT    Note: If patient does not return for scheduled/ recommended follow up visits, this note will serve as a discharge from care along with most recent update on progress.

## 2022-04-07 ENCOUNTER — HOSPITAL ENCOUNTER (OUTPATIENT)
Dept: PHYSICAL THERAPY | Age: 37
Setting detail: THERAPIES SERIES
Discharge: HOME | End: 2022-04-07
Payer: MEDICAID

## 2022-04-07 PROCEDURE — 97110 THERAPEUTIC EXERCISES: CPT

## 2022-04-07 PROCEDURE — 97112 NEUROMUSCULAR REEDUCATION: CPT

## 2022-04-07 PROCEDURE — 97530 THERAPEUTIC ACTIVITIES: CPT

## 2022-04-07 NOTE — FLOWSHEET NOTE
[facility], [address] [place] [address] [address]  Phone: [phone]   Fax:     [phone]      Physical Therapy Treatment Note/ Progress Report:           Date:  2022    Patient Name:  Kay Sherman    :  1985  MRN: [id number]  Restrictions/Precautions:    Medical/Treatment Diagnosis Information:  · Diagnosis: M22.41, M22.42 (ICD-10-CM) - Chondromalacia of both patellae; S83.282D (ICD-10-CM) - Acute lateral meniscus tear of left knee, subsequent encounter  · Treatment Diagnosis: Bilateral knee pain, decreased ROM, flexibility, strength  Insurance/Certification information:  PT Insurance Information: Silvina Saha  Physician Information:    Has the plan of care been signed (Y/N):        []  Yes  []  No     Date of Patient follow up with Physician:     Assessment Summary: Eduardo Johnson is a 39 y.o. male reporting to OP PT with c/c of bilateral knee pain and knee scopes bilaterally, left on 21 and right on 21. Pt is noted to have reduced ROM, flexibility and strength. Overall reduced functional mobility. Will progress as tolerated.        Is this a Progress Report:     []  Yes  [x]  No        If Yes:  Date Range for reporting period:  Beginning   22  Ending 22    Progress report will be due (10 Rx or 30 days whichever is less): 3/9/92       Recertification will be due (POC Duration  / 90 days whichever is less): 3/4/22          Visit # Insurance Allowable Auth Required   In Person  30 []  Yes     []  No    Tele Health   []  Yes     []  No    Total 23             Functional Scale: LEFS 60%, 49% ,    Date assessed:        Latex Allergy:  [x]NO      []YES  Preferred Language for Healthcare:   [x]English       []other:      Pain level:  1/10     SUBJECTIVE:  Pt states got knee braces    OBJECTIVE:         RESTRICTIONS/PRECAUTIONS: None     Exercises/Interventions: HEP code: [address]  Therapeutic Ex (98615) HEP 3/21/22 4/4//22 4/7/22   Warm-up       Rec bike  7', Lv 5 7', Lv 6 7', Lv 6          TABLE       Heel slides x      Strap gastroc stretch x      Hs stretch x      Quad set       SLR x      Bridge x      Bridge w/ march       Melvin stretch x      Quad stretch              SEATED                                                 STANDING       Wedge gastroc stretch  1' 1' 1'   Heel raises 1/2 roll  10x3 10x3 10x3   Toe raises 1/2 roll  10x3 10x3 10x3   HS stretch  1' 1' 1' B   Anterior step ups  -- -- --   Anterior step downs  -- -- --   Lateral step ups  -- -- --   Resisted side steps  10'x2 lap, GTB -- 10'x2 laps, GTB   Monster walks  10'x1 lap GTB -- --   Knee extension machine  10x2, 40# X30, 40# X20, 45#   HS curl machine  10x2, 60# X30, 50# X30, 55#   COREY Abduction  x20 B, 65# x20 B, 75# x20 B, 80#   COREY Extension  x20 B, 65# x20 B, 74# x20 B, 80#   COREY Flexion  x20 B, 65# x20 B, 75# x20 B, 80#   Leg Press DL  X15, 85# X15, 100# X20, 100#   Leg Press Eccentric  x15 B, 65# X15, 70# x15 B, 70#   Retrowalk CC  X10, 55# X15, 45# X15, 55#   BOSU balance  1' 1' 1'   BOSU squats  x15 10x2 10x2   BOSU marches  x30 -- --   BOSU step ups  x15 B x15 B x15 B   Stool push SL   20'x3 20'x4   Stool pull DL   20\"x4 20'x4   Ladder  x15 x15 x15   Steamboats   x20 ea, BTB                            Manual (93989): Therapeutic Exercise and NMR EXR  [x] (53827) Provided verbal/tactile cueing for activities related to strengthening, flexibility, endurance, ROM for improvements in LE, proximal hip, and core control with self care, mobility, lifting, ambulation. [x] (10088) Provided verbal/tactile cueing for activities related to improving balance, coordination, kinesthetic sense, posture, motor skill, proprioception to assist with LE, proximal hip, and core control in self-care, mobility, lifting, ambulation and eccentric single leg control.      NMR and Therapeutic Activities:    [x] (35627 or 29592) Provided verbal/tactile cueing for activities related to improving balance, coordination, kinesthetic sense, posture, motor skill, proprioception and motor activation to allow for proper function of core, proximal hip and LE with self-care and ADLs and functional mobility.   [] (81401) Gait Re-education- Provided training and instruction to the patient for proper LE, core and proximal hip recruitment and positioning and eccentric body weight control with ambulation re-education including up and down stairs     Home Exercise Program:    [x] (95122) Reviewed/Progressed HEP activities related to strengthening, flexibility, endurance, ROM of core, proximal hip and LE for functional self-care, mobility, lifting and ambulation/stair navigation   [] (19510) Reviewed/Progressed HEP activities related to improving balance, coordination, kinesthetic sense, posture, motor skill, proprioception of core, proximal hip and LE for self-care, mobility, lifting, and ambulation/stair navigation      Manual Treatments:  PROM / STM / Oscillations-Mobs:  G-I, II, III, IV (PA's, Inf., Post.)  [x] (89732) Provided manual therapy to mobilize LE, proximal hip and/or LS spine soft tissue/joints for the purpose of modulating pain, promoting relaxation, increasing ROM, reducing/eliminating soft tissue swelling/inflammation/restriction, improving soft tissue extensibility and allowing for proper ROM for normal function with self-care, mobility, lifting and ambulation. Modalities:     [x] GAME READY (VASO)- for significant edema, swelling, pain control.   Right LE, no adverse skin reaction      Charges:  Timed Code Treatment Minutes: 55   Total Treatment Minutes:  55   BWC:  TE TIME:  NMR TIME:  MANUAL TIME:   TA  UNTIMED MINUTES:  Medicare Total:   35  10    10          [] EVAL (LOW) 78709 (typically 20 minutes face-to-face)  [] EVAL (MOD) 04479 (typically 30 minutes face-to-face)  [] EVAL (HIGH) 47404 (typically 45 minutes face-to-face)  [] RE-EVAL     [x] OJ(14769) 2     [] IONTO  [x] NMR (88440) 1     [x] VASO  [] Manual (75250) x     [] Dry Needle:  [x] TA 1      [] [facility] (35370)  [] ES(attended) (13675)      [] ES (un) (76763):        GOALS:     Patient stated goal: Improve mobility, return to work      Therapist goals for Patient:   Short Term Goals: To be achieved in: 2 weeks  1. Independent in HEP and progression per patient tolerance, in order to prevent re-injury. []? Progressing: [x]? Met: []? Not Met: []? Adjusted      2. Patient will have a decrease in pain of NRPS to </=3/10 facilitate improvement in movement, function, and ADLs as indicated by Functional Deficits. [x]? Progressing: [x]? Met: []? Not Met: []? Adjusted      Long Term Goals: To be achieved in: 8 weeks  1. Disability index score of 30% or less for the LEFS to assist with reaching prior level of function. [x]? Progressing: []? Met: []? Not Met: []? Adjusted      2. Patient will demonstrate increased AROM to 0-130 to bilateral knees to allow for proper joint functioning as indicated by patients Functional Deficits. []? Progressing: [x]? Met: []? Not Met: []? Adjusted      3. Patient will demonstrate an increase in Strength to 5/5 to knee flex/ext allow for proper functional mobility as indicated by patients Functional Deficits. []? Progressing: [x]? Met: []? Not Met: []? Adjusted      4. Patient will return to functional walking activities with NRPS of </= 1/10. [x]? Progressing: []? Met: []? Not Met: []? Adjusted      5. Pt will be able to ascend/descend ladder with deviation to allow for return to work activities.  (patient specific functional goal)    []? Progressing: [x]? Met: []? Not Met: []? Adjusted                ASSESSMENT:  Temit[place] Person session well. Continues to move better. Patient received education on their current pathology and how their condition effects them with their functional activities. Patient understood discussion and questions were answered.  Patient understands their activity limitations and understands rational for treatment progression. Pt educated on plan of care and HEP, if worsening symptoms to d/c that exercise. PLAN: See eval  [x] Continue per plan of care [] Alter current plan (see comments above)  [] Plan of care initiated [] Hold pending MD visit [] Discharge      Electronically signed by:  Dimple Hernandez, PT    Note: If patient does not return for scheduled/ recommended follow up visits, this note will serve as a discharge from care along with most recent update on progress.

## 2022-04-08 ENCOUNTER — TELEPHONE (OUTPATIENT)
Dept: ORTHOPEDIC SURGERY | Age: 37
End: 2022-04-08

## 2022-04-08 NOTE — TELEPHONE ENCOUNTER
MRO request:    Imported medical records Felisa Angelo) for 11/5/2021 to present into MRO for [facility]

## 2022-04-11 ENCOUNTER — HOSPITAL ENCOUNTER (OUTPATIENT)
Dept: PHYSICAL THERAPY | Age: 37
Setting detail: THERAPIES SERIES
Discharge: HOME | End: 2022-04-11
Payer: MEDICAID

## 2022-04-11 PROCEDURE — 97140 MANUAL THERAPY 1/> REGIONS: CPT

## 2022-04-11 PROCEDURE — 97110 THERAPEUTIC EXERCISES: CPT

## 2022-04-11 PROCEDURE — 97530 THERAPEUTIC ACTIVITIES: CPT

## 2022-04-11 NOTE — FLOWSHEET NOTE
[facility], [address] [place] [address] [address]  Phone: [phone]   Fax:     [phone]      Physical Therapy Treatment Note/ Progress Report:           Date:  2022    Patient Name:  Elsy Abdul    :  1985  MRN: [id number]  Restrictions/Precautions:    Medical/Treatment Diagnosis Information:  · Diagnosis: M22.41, M22.42 (ICD-10-CM) - Chondromalacia of both patellae; S83.282D (ICD-10-CM) - Acute lateral meniscus tear of left knee, subsequent encounter  · Treatment Diagnosis: Bilateral knee pain, decreased ROM, flexibility, strength  Insurance/Certification information:  PT Insurance Information: Janiya Brar  Physician Information:    Has the plan of care been signed (Y/N):        []  Yes  []  No     Date of Patient follow up with Physician:     Assessment Summary: Santos Shannon is a 39 y.o. male reporting to OP PT with c/c of bilateral knee pain and knee scopes bilaterally, left on 21 and right on 21. Pt is noted to have reduced ROM, flexibility and strength. Overall reduced functional mobility. Will progress as tolerated. Is this a Progress Report:     []  Yes  [x]  No        If Yes:  Date Range for reporting period:  Beginning   22  Ending 22    Progress report will be due (10 Rx or 30 days whichever is less): 21       Recertification will be due (POC Duration  / 90 days whichever is less): 3/4/22          Visit # Insurance Allowable Auth Required   In Person  30 []  Yes     []  No    Tele Health   []  Yes     []  No    Total 24             Functional Scale: LEFS 60%, 49% ,    Date assessed:        Latex Allergy:  [x]NO      []YES  Preferred Language for Healthcare:   [x]English       []other:      Pain level:  210     SUBJECTIVE:  Pt states knees continue to get better.          OBJECTIVE:         RESTRICTIONS/PRECAUTIONS: None     Exercises/Interventions: HEP code: [address]  Therapeutic Ex (93719) HEP 3/21/22 4/4//22 4/11/22   Warm-up       Rec bike  7', Lv 5 7', Lv 6 7', Lv 6          TABLE       Heel slides x      Strap gastroc stretch x      Hs stretch x      Quad set       SLR x      Bridge x      Bridge w/ march       Melvin stretch x      Quad stretch              SEATED                                                 STANDING       Wedge gastroc stretch  1' 1' 1'   Heel raises 1/2 roll  10x3 10x3 10x3   Toe raises 1/2 roll  10x3 10x3 10x3   HS stretch  1' 1' 1' B   Anterior step ups  -- -- --   Anterior step downs  -- -- --   Lateral step ups  -- -- --   Resisted side steps  10'x2 lap, GTB -- 10'x2 laps, GTB   Monster walks  10'x1 lap GTB -- --   Knee extension machine  10x2, 40# X30, 40# X20, 45#   HS curl machine  10x2, 60# X30, 50# X30, 55#   COREY Abduction  x20 B, 65# x20 B, 75# x20 B, 80#   COREY Extension  x20 B, 65# x20 B, 74# x20 B, 80#   COREY Flexion  x20 B, 65# x20 B, 75# x20 B, 80#   Leg Press DL  X15, 85# X15, 100# X20, 100#   Leg Press Eccentric  x15 B, 65# X15, 70# x15 B, 70#   Retrowalk CC  X10, 55# X15, 45# X15, 55#   BOSU balance  1' 1' 1'   BOSU squats  x15 10x2 10x2   BOSU marches  x30 -- --   BOSU step ups  x15 B x15 B x15 B   Stool push SL   20'x3 20'x4   Stool pull DL   20\"x4 20'x4   Ladder  x15 x15 x15   Steamboats   x20 ea, BTB                            Manual (99637): Therapeutic Exercise and NMR EXR  [x] (09111) Provided verbal/tactile cueing for activities related to strengthening, flexibility, endurance, ROM for improvements in LE, proximal hip, and core control with self care, mobility, lifting, ambulation. [x] (82817) Provided verbal/tactile cueing for activities related to improving balance, coordination, kinesthetic sense, posture, motor skill, proprioception to assist with LE, proximal hip, and core control in self-care, mobility, lifting, ambulation and eccentric single leg control.      NMR and Therapeutic Activities:    [x] (45984 or 11345) Provided verbal/tactile cueing for activities related to improving balance, coordination, kinesthetic sense, posture, motor skill, proprioception and motor activation to allow for proper function of core, proximal hip and LE with self-care and ADLs and functional mobility.   [] (52575) Gait Re-education- Provided training and instruction to the patient for proper LE, core and proximal hip recruitment and positioning and eccentric body weight control with ambulation re-education including up and down stairs     Home Exercise Program:    [x] (17465) Reviewed/Progressed HEP activities related to strengthening, flexibility, endurance, ROM of core, proximal hip and LE for functional self-care, mobility, lifting and ambulation/stair navigation   [] (27824) Reviewed/Progressed HEP activities related to improving balance, coordination, kinesthetic sense, posture, motor skill, proprioception of core, proximal hip and LE for self-care, mobility, lifting, and ambulation/stair navigation      Manual Treatments:  PROM / STM / Oscillations-Mobs:  G-I, II, III, IV (PA's, Inf., Post.)  [x] (62448) Provided manual therapy to mobilize LE, proximal hip and/or LS spine soft tissue/joints for the purpose of modulating pain, promoting relaxation, increasing ROM, reducing/eliminating soft tissue swelling/inflammation/restriction, improving soft tissue extensibility and allowing for proper ROM for normal function with self-care, mobility, lifting and ambulation. Modalities:     [x] GAME READY (VASO)- for significant edema, swelling, pain control.   Right LE, no adverse skin reaction      Charges:  Timed Code Treatment Minutes: 55   Total Treatment Minutes:  55   BWC:  TE TIME:  NMR TIME:  MANUAL TIME:   TA  UNTIMED MINUTES:  Medicare Total:   35  10    10          [] EVAL (LOW) 91886 (typically 20 minutes face-to-face)  [] EVAL (MOD) 22075 (typically 30 minutes face-to-face)  [] EVAL (HIGH) 31500 (typically 45 minutes face-to-face)  [] RE-EVAL [x] GP(65913) 2     [] IONTO  [x] NMR (91863) 1     [] VASO  [] Manual (18557) x     [] Dry Needle:  [x] TA 1      [] Mech Traction (58280)  [] ES(attended) (31918)      [] ES (un) (57240):        GOALS:     Patient stated goal: Improve mobility, return to work      Therapist goals for Patient:   Short Term Goals: To be achieved in: 2 weeks  1. Independent in HEP and progression per patient tolerance, in order to prevent re-injury. []? Progressing: [x]? Met: []? Not Met: []? Adjusted      2. Patient will have a decrease in pain of NRPS to </=3/10 facilitate improvement in movement, function, and ADLs as indicated by Functional Deficits. [x]? Progressing: [x]? Met: []? Not Met: []? Adjusted      Long Term Goals: To be achieved in: 8 weeks  1. Disability index score of 30% or less for the LEFS to assist with reaching prior level of function. [x]? Progressing: []? Met: []? Not Met: []? Adjusted      2. Patient will demonstrate increased AROM to 0-130 to bilateral knees to allow for proper joint functioning as indicated by patients Functional Deficits. []? Progressing: [x]? Met: []? Not Met: []? Adjusted      3. Patient will demonstrate an increase in Strength to 5/5 to knee flex/ext allow for proper functional mobility as indicated by patients Functional Deficits. []? Progressing: [x]? Met: []? Not Met: []? Adjusted      4. Patient will return to functional walking activities with NRPS of </= 1/10. [x]? Progressing: []? Met: []? Not Met: []? Adjusted      5. Pt will be able to ascend/descend ladder with deviation to allow for return to work activities.  (patient specific functional goal)    []? Progressing: [x]? Met: []? Not Met: []? Adjusted                ASSESSMENT:  Aurelia Sanders session well. Continues to move better. Patient received education on their current pathology and how their condition effects them with their functional activities.  Patient understood discussion and questions were answered. Patient understands their activity limitations and understands rational for treatment progression. Pt educated on plan of care and HEP, if worsening symptoms to d/c that exercise. PLAN: See eval  [x] Continue per plan of care [] Alter current plan (see comments above)  [] Plan of care initiated [] Hold pending MD visit [] Discharge      Electronically signed by:  Cora Marinelli, PT    Note: If patient does not return for scheduled/ recommended follow up visits, this note will serve as a discharge from care along with most recent update on progress.

## 2022-04-21 ENCOUNTER — HOSPITAL ENCOUNTER (OUTPATIENT)
Dept: PHYSICAL THERAPY | Age: 37
Setting detail: THERAPIES SERIES
Discharge: HOME | End: 2022-04-21
Payer: MEDICAID

## 2022-04-21 PROCEDURE — 97530 THERAPEUTIC ACTIVITIES: CPT

## 2022-04-21 PROCEDURE — 97112 NEUROMUSCULAR REEDUCATION: CPT

## 2022-04-21 PROCEDURE — 97110 THERAPEUTIC EXERCISES: CPT

## 2022-04-21 NOTE — FLOWSHEET NOTE
[facility], [address] [place] [address] [address]  Phone: [phone]   Fax:     [phone]      Physical Therapy Treatment Note/ Progress Report:           Date:  2022    Patient Name:  Renee Pisano    :  1985  MRN: [id number]  Restrictions/Precautions:    Medical/Treatment Diagnosis Information:  · Diagnosis: M22.41, M22.42 (ICD-10-CM) - Chondromalacia of both patellae; S83.282D (ICD-10-CM) - Acute lateral meniscus tear of left knee, subsequent encounter  · Treatment Diagnosis: Bilateral knee pain, decreased ROM, flexibility, strength  Insurance/Certification information:  PT Insurance Information: Salomón Burkett  Physician Information:    Has the plan of care been signed (Y/N):        []  Yes  []  No     Date of Patient follow up with Physician:     Assessment Summary: Hussein Bernardo is a 39 y.o. male reporting to OP PT with c/c of bilateral knee pain and knee scopes bilaterally, left on 21 and right on 21. Pt is noted to have reduced ROM, flexibility and strength. Overall reduced functional mobility. Will progress as tolerated.        Is this a Progress Report:     []  Yes  [x]  No        If Yes:  Date Range for reporting period:  Beginning   22  Ending 22    Progress report will be due (10 Rx or 30 days whichever is less): 77       Recertification will be due (POC Duration  / 90 days whichever is less): 3/4/22          Visit # Insurance Allowable Auth Required   In Person  30 []  Yes     []  No    Tele Health   []  Yes     []  No    Total 25             Functional Scale: LEFS 60%, 49% , 30%   Date assessed:        Latex Allergy:  [x]NO      []YES  Preferred Language for Healthcare:   [x]English       []other:      Pain level:  1/10     SUBJECTIVE:  Pt states knees continue to feel better        OBJECTIVE:         RESTRICTIONS/PRECAUTIONS: None     Exercises/Interventions: HEP code: [address]  Therapeutic Ex (24060) HEP 4/4//22 4/21/22    Warm-up       Rec bike  7', Lv 6 7', Lv 6           TABLE       Heel slides x      Strap gastroc stretch x      Hs stretch x      Quad set       SLR x      Bridge x      Bridge w/ march       Melvin stretch x      Quad stretch              SEATED                                                 STANDING       Wedge gastroc stretch  1' 1'    Heel raises 1/2 roll  10x3 10x3    Toe raises 1/2 roll  10x3 10x3    HS stretch  1' 1' B    Anterior step ups  -- --    Anterior step downs  -- --    Lateral step ups  -- --    Resisted side steps  -- 10'x2 laps, GTB    Monster walks  -- --    Knee extension machine  X30, 40# X20, 45#    HS curl machine  X30, 50# X30, 55#    COREY Abduction  x20 B, 75# x20 B, 80#    COREY Extension  x20 B, 74# x20 B, 80#    COREY Flexion  x20 B, 75# x20 B, 80#    Leg Press DL  X15, 100# X20, 100#    Leg Press Eccentric  X15, 70# x15 B, 70#    Retrowalk CC  X15, 45# X15, 55#    BOSU balance  1' 1'    BOSU squats  10x2 10x2    BOSU marches  -- --    BOSU step ups  x15 B x15 B    Stool push SL  20'x3 20'x4    Stool pull DL  20\"x4 20'x4    Ladder  x15 x15    Steamboats  x20 ea, BTB x20 ea BlkTB                            Manual (73883): Therapeutic Exercise and NMR EXR  [x] (97007) Provided verbal/tactile cueing for activities related to strengthening, flexibility, endurance, ROM for improvements in LE, proximal hip, and core control with self care, mobility, lifting, ambulation. [x] (63481) Provided verbal/tactile cueing for activities related to improving balance, coordination, kinesthetic sense, posture, motor skill, proprioception to assist with LE, proximal hip, and core control in self-care, mobility, lifting, ambulation and eccentric single leg control.      NMR and Therapeutic Activities:    [x] (84406 or 02452) Provided verbal/tactile cueing for activities related to improving balance, coordination, kinesthetic sense, posture, motor skill, proprioception and motor activation to allow for proper function of core, proximal hip and LE with self-care and ADLs and functional mobility.   [] (75229) Gait Re-education- Provided training and instruction to the patient for proper LE, core and proximal hip recruitment and positioning and eccentric body weight control with ambulation re-education including up and down stairs     Home Exercise Program:    [x] (61262) Reviewed/Progressed HEP activities related to strengthening, flexibility, endurance, ROM of core, proximal hip and LE for functional self-care, mobility, lifting and ambulation/stair navigation   [] (56953) Reviewed/Progressed HEP activities related to improving balance, coordination, kinesthetic sense, posture, motor skill, proprioception of core, proximal hip and LE for self-care, mobility, lifting, and ambulation/stair navigation      Manual Treatments:  PROM / STM / Oscillations-Mobs:  G-I, II, III, IV (PA's, Inf., Post.)  [x] (86649) Provided manual therapy to mobilize LE, proximal hip and/or LS spine soft tissue/joints for the purpose of modulating pain, promoting relaxation, increasing ROM, reducing/eliminating soft tissue swelling/inflammation/restriction, improving soft tissue extensibility and allowing for proper ROM for normal function with self-care, mobility, lifting and ambulation. Modalities:     [x] GAME READY (VASO)- for significant edema, swelling, pain control.   Right LE, no adverse skin reaction      Charges:  Timed Code Treatment Minutes: 55   Total Treatment Minutes:  55   BWC:  TE TIME:  NMR TIME:  MANUAL TIME:   TA  UNTIMED MINUTES:  Medicare Total:   35  10    10          [] EVAL (LOW) 61838 (typically 20 minutes face-to-face)  [] EVAL (MOD) 77852 (typically 30 minutes face-to-face)  [] EVAL (HIGH) 70442 (typically 45 minutes face-to-face)  [] RE-EVAL     [x] QK(48917) 2     [] IONTO  [x] NMR (79816) 1     [] VASO  [] Manual (08433) x     [] Dry Needle:  [x] TA 1      [] [facility] Traction (61649)  [] ES(attended) (54846)      [] ES (un) (19894):        GOALS:     Patient stated goal: Improve mobility, return to work      Therapist goals for Patient:   Short Term Goals: To be achieved in: 2 weeks  1. Independent in HEP and progression per patient tolerance, in order to prevent re-injury. []? Progressing: [x]? Met: []? Not Met: []? Adjusted      2. Patient will have a decrease in pain of NRPS to </=3/10 facilitate improvement in movement, function, and ADLs as indicated by Functional Deficits. [x]? Progressing: [x]? Met: []? Not Met: []? Adjusted      Long Term Goals: To be achieved in: 8 weeks  1. Disability index score of 30% or less for the LEFS to assist with reaching prior level of function. []? Progressing: [x]? Met: []? Not Met: []? Adjusted      2. Patient will demonstrate increased AROM to 0-130 to bilateral knees to allow for proper joint functioning as indicated by patients Functional Deficits. []? Progressing: [x]? Met: []? Not Met: []? Adjusted      3. Patient will demonstrate an increase in Strength to 5/5 to knee flex/ext allow for proper functional mobility as indicated by patients Functional Deficits. []? Progressing: [x]? Met: []? Not Met: []? Adjusted      4. Patient will return to functional walking activities with NRPS of </= 1/10. [x]? Progressing: []? Met: []? Not Met: []? Adjusted      5. Pt will be able to ascend/descend ladder with deviation to allow for return to work activities.  (patient specific functional goal)    []? Progressing: [x]? Met: []? Not Met: []? Adjusted                ASSESSMENT:  [facility] has been seen in PT for 25 visits for bilateral knee pain. Pt has responded fair overall to PT session which have been addressing progressive functional strengthening, balance and flexibility. Pt is reporting pain levels are improving some. He is progressing with weight and coming closer to what I would expect as normal resistence. There is some self limiting behavior I feel. Will continue trying to work through progressive loading and more functional loading to help facilitate return to work. Patient received education on their current pathology and how their condition effects them with their functional activities. Patient understood discussion and questions were answered. Patient understands their activity limitations and understands rational for treatment progression. Pt educated on plan of care and HEP, if worsening symptoms to d/c that exercise. PLAN: See eval  [x] Continue per plan of care [] Alter current plan (see comments above)  [] Plan of care initiated [] Hold pending MD visit [] Discharge      Electronically signed by:  Gurjit Nelson PT    Note: If patient does not return for scheduled/ recommended follow up visits, this note will serve as a discharge from care along with most recent update on progress.

## 2022-04-21 NOTE — PROGRESS NOTES
[address] [facility], [address] [place] [address] [address]  Phone: [phone]   Fax: [phone]    Date: 2022          Patient Name; :  Marcela Frost; 1985   Dx: Diagnosis: M22.41, M22.42 (ICD-10-CM) - Chondromalacia of both patellae; S83.282D (ICD-10-CM) - Acute lateral meniscus tear of left knee, subsequent encounter      Physician: Referring Practitioner: Nathan Gonzalez MD        Total PT Visits: 25     Measures Previous Current   Pain (0-10) 5 1   Disability % 49% 30%         Assessment:  Michael Hidalgo has been seen in PT for 25 visits for bilateral knee pain. Pt has responded fair overall to PT session which have been addressing progressive functional strengthening, balance and flexibility. Pt is reporting pain levels are improving some. He is progressing with weight and coming closer to what I would expect as normal resistence. There is some self limiting behavior I feel. Will continue trying to work through progressive loading and more functional loading to help facilitate return to work. Prognosis for POC: [x] Good [] Fair  [] Poor      Patient requires continued skilled intervention: [x] Yes  [] No        Plan & Recommendations:  [x] Continue rehabilitation due to objective improvement and continued functional deficits with frequency and duration:   [] Progress toward  []GAP, []Work Conditioning, []Independent HEP   [] Discharge due to   [] All goals achieved, [] Maximized \"medical necessity\" [] No subjective or objective improvements      Electronically signed by:  Adam Cruz, PT  Therapy Plan of Care Re-Certification  This patient has been re-evaluated for physical therapy services and for therapy to continue, Medicare, Medicaid and other insurances require periodic physician review of the treatment plan.  Please review the above re-evaluation and verify that you agree with plan of care as established above by signing the attached document and return it to our office or note changes to established plan below  [] Follow treatment plan as above [] Discontinue physical therapy  [] Change plan to:                                 __________________________________________________    Physician Signature:____________________________________ Date:____________  By signing above, therapists plan is approved by physician    If you have any questions or concerns, please don't hesitate to call.   Thank you for your referral.

## 2022-04-26 ENCOUNTER — HOSPITAL ENCOUNTER (OUTPATIENT)
Dept: PHYSICAL THERAPY | Age: 37
Setting detail: THERAPIES SERIES
Discharge: HOME | End: 2022-04-26
Payer: MEDICAID

## 2022-04-26 PROCEDURE — 97530 THERAPEUTIC ACTIVITIES: CPT

## 2022-04-26 PROCEDURE — 97112 NEUROMUSCULAR REEDUCATION: CPT

## 2022-04-26 PROCEDURE — 97110 THERAPEUTIC EXERCISES: CPT

## 2022-04-26 NOTE — FLOWSHEET NOTE
277 [facility], [address] [place] [address] [address]  Phone: [phone]   Fax:     [phone]      Physical Therapy Treatment Note/ Progress Report:           Date:  2022    Patient Name:  Polina Wheeler    :  1985  MRN: [id number]  Restrictions/Precautions:    Medical/Treatment Diagnosis Information:  · Diagnosis: M22.41, M22.42 (ICD-10-CM) - Chondromalacia of both patellae; S83.282D (ICD-10-CM) - Acute lateral meniscus tear of left knee, subsequent encounter  · Treatment Diagnosis: Bilateral knee pain, decreased ROM, flexibility, strength  Insurance/Certification information:  PT Insurance Information: Chandana Lan  Physician Information:    Has the plan of care been signed (Y/N):        []  Yes  []  No     Date of Patient follow up with Physician:     Assessment Summary: Roman Dugan is a 39 y.o. male reporting to OP PT with c/c of bilateral knee pain and knee scopes bilaterally, left on 21 and right on 21. Pt is noted to have reduced ROM, flexibility and strength. Overall reduced functional mobility. Will progress as tolerated. Is this a Progress Report:     []  Yes  [x]  No        If Yes:  Date Range for reporting period:  Beginning   22  Ending 22    Progress report will be due (10 Rx or 30 days whichever is less): 28       Recertification will be due (POC Duration  / 90 days whichever is less): 3/4/22          Visit # Insurance Allowable Auth Required   In Person  30 []  Yes     []  No    Tele Health   []  Yes     []  No    Total 26             Functional Scale: LEFS 60%, 49% , 30%   Date assessed:        Latex Allergy:  [x]NO      []YES  Preferred Language for Healthcare:   [x]English       []other:      Pain level:  2/10     SUBJECTIVE:  Pt states knees a re a little sore today. Had to do  A lot over weekend.          OBJECTIVE:         RESTRICTIONS/PRECAUTIONS: None     Exercises/Interventions: HEP code: [address]  Therapeutic Ex (73642) HEP 4/4//22 4/26/22 4/26/22   Warm-up       Rec bike  7', Lv 6 7', Lv 6           TABLE       Heel slides x      Strap gastroc stretch x      Hs stretch x      Quad set       SLR x      Bridge x      Bridge w/ march       Melvin stretch x      Quad stretch              SEATED                                                 STANDING       Wedge gastroc stretch  1' 1'    Heel raises 1/2 roll  10x3 10x3    Toe raises 1/2 roll  10x3 10x3    HS stretch  1' 1' B    Anterior step ups  -- --    Anterior step downs  -- --    Lateral step ups  -- --    Resisted side steps  -- 10'x2 laps, GTB    Monster walks  -- --    Knee extension machine  X30, 40# X20, 45#    HS curl machine  X30, 50# X30, 55#    COREY Abduction  x20 B, 75# x20 B, 80#    COREY Extension  x20 B, 74# x20 B, 80#    COREY Flexion  x20 B, 75# x20 B, 80#    Leg Press DL  X15, 100# X20, 100#    Leg Press Eccentric  X15, 70# x15 B, 70#    Retrowalk CC  X15, 45# X15, 55#    BOSU balance  1' 1'    BOSU squats  10x2 10x2    BOSU marches  -- --    BOSU step ups  x15 B x15 B    Stool push SL  20'x3 20'x4    Stool pull DL  20\"x4 20'x4    Ladder  x15 x15    Steamboats  x20 ea, BTB x20 ea BlkTB                            Manual (99763): Therapeutic Exercise and NMR EXR  [x] (73752) Provided verbal/tactile cueing for activities related to strengthening, flexibility, endurance, ROM for improvements in LE, proximal hip, and core control with self care, mobility, lifting, ambulation. [x] (52131) Provided verbal/tactile cueing for activities related to improving balance, coordination, kinesthetic sense, posture, motor skill, proprioception to assist with LE, proximal hip, and core control in self-care, mobility, lifting, ambulation and eccentric single leg control.      NMR and Therapeutic Activities:    [x] (92107 or 83855) Provided verbal/tactile cueing for activities related to improving balance, coordination, kinesthetic sense, posture, TA 1      [] [facility] Traction (33840)  [] ES(attended) (71494)      [] ES (un) (39567):        GOALS:     Patient stated goal: Improve mobility, return to work      Therapist goals for Patient:   Short Term Goals: To be achieved in: 2 weeks  1. Independent in HEP and progression per patient tolerance, in order to prevent re-injury. []? Progressing: [x]? Met: []? Not Met: []? Adjusted      2. Patient will have a decrease in pain of NRPS to </=3/10 facilitate improvement in movement, function, and ADLs as indicated by Functional Deficits. [x]? Progressing: [x]? Met: []? Not Met: []? Adjusted      Long Term Goals: To be achieved in: 8 weeks  1. Disability index score of 30% or less for the LEFS to assist with reaching prior level of function. []? Progressing: [x]? Met: []? Not Met: []? Adjusted      2. Patient will demonstrate increased AROM to 0-130 to bilateral knees to allow for proper joint functioning as indicated by patients Functional Deficits. []? Progressing: [x]? Met: []? Not Met: []? Adjusted      3. Patient will demonstrate an increase in Strength to 5/5 to knee flex/ext allow for proper functional mobility as indicated by patients Functional Deficits. []? Progressing: [x]? Met: []? Not Met: []? Adjusted      4. Patient will return to functional walking activities with NRPS of </= 1/10. [x]? Progressing: []? Met: []? Not Met: []? Adjusted      5. Pt will be able to ascend/descend ladder with deviation to allow for return to work activities.  (patient specific functional goal)    []? Progressing: [x]? Met: []? Not Met: []? Adjusted                ASSESSMENT:  Seamus Spain session well. Patient received education on their current pathology and how their condition effects them with their functional activities. Patient understood discussion and questions were answered. Patient understands their activity limitations and understands rational for treatment progression.   Pt educated on plan of care and HEP, if worsening symptoms to d/c that exercise. PLAN: See eval  [x] Continue per plan of care [] Alter current plan (see comments above)  [] Plan of care initiated [] Hold pending MD visit [] Discharge      Electronically signed by:  Adam Carmen PT    Note: If patient does not return for scheduled/ recommended follow up visits, this note will serve as a discharge from care along with most recent update on progress.

## 2022-05-02 ENCOUNTER — OFFICE VISIT (OUTPATIENT)
Dept: ORTHOPEDIC SURGERY | Age: 37
End: 2022-05-02
Payer: MEDICAID

## 2022-05-02 VITALS — WEIGHT: 233 LBS | BODY MASS INDEX: 33.36 KG/M2 | HEIGHT: 70 IN

## 2022-05-02 DIAGNOSIS — M22.42 CHONDROMALACIA OF BOTH PATELLAE: Primary | ICD-10-CM

## 2022-05-02 DIAGNOSIS — M22.41 CHONDROMALACIA OF BOTH PATELLAE: Primary | ICD-10-CM

## 2022-05-02 PROCEDURE — G8427 DOCREV CUR MEDS BY ELIG CLIN: HCPCS | Performed by: ORTHOPAEDIC SURGERY

## 2022-05-02 PROCEDURE — 4004F PT TOBACCO SCREEN RCVD TLK: CPT | Performed by: ORTHOPAEDIC SURGERY

## 2022-05-02 PROCEDURE — 99212 OFFICE O/P EST SF 10 MIN: CPT | Performed by: ORTHOPAEDIC SURGERY

## 2022-05-02 PROCEDURE — G8417 CALC BMI ABV UP PARAM F/U: HCPCS | Performed by: ORTHOPAEDIC SURGERY

## 2022-05-02 NOTE — PROGRESS NOTES
He returns today for evaluation. He continues to improve and feels about 70% improved from prior visits. At this time I recommend that he continue with formal physical therapy to maximum improvement and return here in 4 weeks. He should remain off work 6 weeks. His knee exam is slowly improving with minimal crepitus in both knees.

## 2022-05-03 ENCOUNTER — HOSPITAL ENCOUNTER (OUTPATIENT)
Dept: PHYSICAL THERAPY | Age: 37
Setting detail: THERAPIES SERIES
Discharge: HOME | End: 2022-05-03
Payer: MEDICAID

## 2022-05-03 PROCEDURE — 97112 NEUROMUSCULAR REEDUCATION: CPT

## 2022-05-03 PROCEDURE — 97110 THERAPEUTIC EXERCISES: CPT

## 2022-05-03 PROCEDURE — 97530 THERAPEUTIC ACTIVITIES: CPT

## 2022-05-03 NOTE — FLOWSHEET NOTE
545 [facility], [address] [place] [address] [address]  Phone: [phone]   Fax:     [phone]      Physical Therapy Treatment Note/ Progress Report:           Date:  5/3/2022    Patient Name:  Wilian Sharma    :  1985  MRN: [id number]  Restrictions/Precautions:    Medical/Treatment Diagnosis Information:  · Diagnosis: M22.41, M22.42 (ICD-10-CM) - Chondromalacia of both patellae; S83.282D (ICD-10-CM) - Acute lateral meniscus tear of left knee, subsequent encounter  · Treatment Diagnosis: Bilateral knee pain, decreased ROM, flexibility, strength  Insurance/Certification information:  PT Insurance Information: [facility]  Physician Information:    Has the plan of care been signed (Y/N):        []  Yes  []  No     Date of Patient follow up with Physician:     Assessment Summary: Babak Tirado is a 39 y.o. male reporting to OP PT with c/c of bilateral knee pain and knee scopes bilaterally, left on 21 and right on 21. Pt is noted to have reduced ROM, flexibility and strength. Overall reduced functional mobility. Will progress as tolerated. Is this a Progress Report:     []  Yes  [x]  No        If Yes:  Date Range for reporting period:  Beginning   22  Ending 22    Progress report will be due (10 Rx or 30 days whichever is less): 3/5/57       Recertification will be due (POC Duration  / 90 days whichever is less): 3/4/22          Visit # Insurance Allowable Auth Required   In Person  30 []  Yes     []  No    Tele Health   []  Yes     []  No    Total 27             Functional Scale: LEFS 60%, 49% , 30%   Date assessed:        Latex Allergy:  [x]NO      []YES  Preferred Language for Healthcare:   [x]English       []other:      Pain level:  1/10     SUBJECTIVE:  Pt states knee feel pretty good.          OBJECTIVE:         RESTRICTIONS/PRECAUTIONS: None     Exercises/Interventions: [address]: [address]  Therapeutic Ex (94885) HEP 4/4//22 5/3/22    Warm-up       Rec bike  7', Lv 6 7', Lv 6           TABLE       Heel slides x      Strap gastroc stretch x      Hs stretch x      Quad set       SLR x      Bridge x      Bridge w/ march       Melvin stretch x      Quad stretch              SEATED                                                 STANDING       Wedge gastroc stretch  1' 1'    Heel raises 1/2 roll  10x3 10x3    Toe raises 1/2 roll  10x3 10x3    HS stretch  1' 1' B    Anterior step ups  -- --    Anterior step downs  -- --    Lateral step ups  -- --    Resisted side steps  -- 10'x2 laps, GTB    Monster walks  -- --    Knee extension machine  X30, 40# X20, 45#    HS curl machine  X30, 50# X30, 55#    COREY Abduction  x20 B, 75# x20 B, 80#    COREY Extension  x20 B, 74# x20 B, 80#    COREY Flexion  x20 B, 75# x20 B, 80#    Leg Press DL  X15, 100# X20, 110#    Leg Press Eccentric  X15, 70# x15 B, 80#    Retrowalk CC  X15, 45# X15, 55#    BOSU balance  1' 1'    BOSU squats  10x2 10x2    BOSU marches  -- --    BOSU step ups  x15 B x15 B    Stool push SL  20'x3 20'x4    Stool pull DL  20\"x4 20'x4    Ladder  x15 x15    Steamboats  x20 ea, BTB x20 ea BlkTB                            Manual (96423): Therapeutic Exercise and NMR EXR  [x] (32898) Provided verbal/tactile cueing for activities related to strengthening, flexibility, endurance, ROM for improvements in LE, proximal hip, and core control with self care, mobility, lifting, ambulation. [x] (72065) Provided verbal/tactile cueing for activities related to improving balance, coordination, kinesthetic sense, posture, motor skill, proprioception to assist with LE, proximal hip, and core control in self-care, mobility, lifting, ambulation and eccentric single leg control.      NMR and Therapeutic Activities:    [x] (16470 or 40929) Provided verbal/tactile cueing for activities related to improving balance, coordination, kinesthetic sense, posture, motor skill, proprioception and motor activation to allow for proper function of core, proximal hip and LE with self-care and ADLs and functional mobility.   [] (50408) Gait Re-education- Provided training and instruction to the patient for proper LE, core and proximal hip recruitment and positioning and eccentric body weight control with ambulation re-education including up and down stairs     Home Exercise Program:    [x] (95624) Reviewed/Progressed HEP activities related to strengthening, flexibility, endurance, ROM of core, proximal hip and LE for functional self-care, mobility, lifting and ambulation/stair navigation   [] (92115) Reviewed/Progressed HEP activities related to improving balance, coordination, kinesthetic sense, posture, motor skill, proprioception of core, proximal hip and LE for self-care, mobility, lifting, and ambulation/stair navigation      Manual Treatments:  PROM / STM / Oscillations-Mobs:  G-I, II, III, IV (PA's, Inf., Post.)  [x] (70635) Provided manual therapy to mobilize LE, proximal hip and/or LS spine soft tissue/joints for the purpose of modulating pain, promoting relaxation, increasing ROM, reducing/eliminating soft tissue swelling/inflammation/restriction, improving soft tissue extensibility and allowing for proper ROM for normal function with self-care, mobility, lifting and ambulation. Modalities:     [x] GAME READY (VASO)- for significant edema, swelling, pain control.   Right LE, no adverse skin reaction      Charges:  Timed Code Treatment Minutes: 55   Total Treatment Minutes:  55   BWC:  TE TIME:  NMR TIME:  MANUAL TIME:   TA  UNTIMED MINUTES:  Medicare Total:   35  10    10          [] EVAL (LOW) 98432 (typically 20 minutes face-to-face)  [] EVAL (MOD) 39056 (typically 30 minutes face-to-face)  [] EVAL (HIGH) 37868 (typically 45 minutes face-to-face)  [] RE-EVAL     [x] NL(81980) 2     [] IONTO  [x] NMR (95649) 1     [] VASO  [] Manual (00992) x     [] Dry Needle:  [x] TA 1      [] [facility] Traction (01104)  [] ES(attended) (50497)      [] ES (un) (85556):        GOALS:     Patient stated goal: Improve mobility, return to work      Therapist goals for Patient:   Short Term Goals: To be achieved in: 2 weeks  1. Independent in HEP and progression per patient tolerance, in order to prevent re-injury. []? Progressing: [x]? Met: []? Not Met: []? Adjusted      2. Patient will have a decrease in pain of NRPS to </=3/10 facilitate improvement in movement, function, and ADLs as indicated by Functional Deficits. [x]? Progressing: [x]? Met: []? Not Met: []? Adjusted      Long Term Goals: To be achieved in: 8 weeks  1. Disability index score of 30% or less for the LEFS to assist with reaching prior level of function. []? Progressing: [x]? Met: []? Not Met: []? Adjusted      2. Patient will demonstrate increased AROM to 0-130 to bilateral knees to allow for proper joint functioning as indicated by patients Functional Deficits. []? Progressing: [x]? Met: []? Not Met: []? Adjusted      3. Patient will demonstrate an increase in Strength to 5/5 to knee flex/ext allow for proper functional mobility as indicated by patients Functional Deficits. []? Progressing: [x]? Met: []? Not Met: []? Adjusted      4. Patient will return to functional walking activities with NRPS of </= 1/10. [x]? Progressing: []? Met: []? Not Met: []? Adjusted      5. Pt will be able to ascend/descend ladder with deviation to allow for return to work activities.  (patient specific functional goal)    []? Progressing: [x]? Met: []? Not Met: []? Adjusted                ASSESSMENT:  Seamus Spain session well. Continues to make some more progressions with weight. Patient received education on their current pathology and how their condition effects them with their functional activities. Patient understood discussion and questions were answered. Patient understands their activity limitations and understands rational for treatment progression.   Pt educated on plan of care and HEP, if worsening symptoms to d/c that exercise. PLAN: See eval  [x] Continue per plan of care [] Alter current plan (see comments above)  [] Plan of care initiated [] Hold pending MD visit [] Discharge      Electronically signed by:  Chanell Woods PT    Note: If patient does not return for scheduled/ recommended follow up visits, this note will serve as a discharge from care along with most recent update on progress.

## 2022-05-09 ENCOUNTER — HOSPITAL ENCOUNTER (OUTPATIENT)
Dept: PHYSICAL THERAPY | Age: 37
Setting detail: THERAPIES SERIES
Discharge: HOME | End: 2022-05-09
Payer: MEDICAID

## 2022-05-09 PROCEDURE — 97016 VASOPNEUMATIC DEVICE THERAPY: CPT

## 2022-05-09 PROCEDURE — 97112 NEUROMUSCULAR REEDUCATION: CPT

## 2022-05-09 PROCEDURE — 97110 THERAPEUTIC EXERCISES: CPT

## 2022-05-23 ENCOUNTER — HOSPITAL ENCOUNTER (OUTPATIENT)
Dept: PHYSICAL THERAPY | Age: 37
Setting detail: THERAPIES SERIES
Discharge: HOME | End: 2022-05-23
Payer: MEDICAID

## 2022-05-23 PROCEDURE — 97112 NEUROMUSCULAR REEDUCATION: CPT

## 2022-05-23 PROCEDURE — 97110 THERAPEUTIC EXERCISES: CPT

## 2022-05-23 PROCEDURE — 97530 THERAPEUTIC ACTIVITIES: CPT

## 2022-05-23 NOTE — FLOWSHEET NOTE
203 [facility], [address] [place] [address] [address]  Phone: [phone]   Fax:     [phone]      Physical Therapy Treatment Note/ Progress Report:           Date:  2022    Patient Name:  Kay Sherman    :  1985  MRN: [id number]  Restrictions/Precautions:    Medical/Treatment Diagnosis Information:  · Diagnosis: M22.41, M22.42 (ICD-10-CM) - Chondromalacia of both patellae; S83.282D (ICD-10-CM) - Acute lateral meniscus tear of left knee, subsequent encounter  · Treatment Diagnosis: Bilateral knee pain, decreased ROM, flexibility, strength  Insurance/Certification information:  PT Insurance Information: Silvina Saha  Physician Information:    Has the plan of care been signed (Y/N):        []  Yes  []  No     Date of Patient follow up with Physician:     Assessment Summary: Eduardo Johnson is a 39 y.o. male reporting to OP PT with c/c of bilateral knee pain and knee scopes bilaterally, left on 21 and right on 21. Pt is noted to have reduced ROM, flexibility and strength. Overall reduced functional mobility. Will progress as tolerated. Is this a Progress Report:     []  Yes  [x]  No        If Yes:  Date Range for reporting period:  Beginning   22  Ending 22    Progress report will be due (10 Rx or 30 days whichever is less): 26       Recertification will be due (POC Duration  / 90 days whichever is less): 3/4/22          Visit # Insurance Allowable Auth Required   In Person  30 []  Yes     []  No    Tele Health   []  Yes     []  No    Total 29             Functional Scale: LEFS 60%, 49% , 30%   Date assessed:        Latex Allergy:  [x]NO      []YES  Preferred Language for Healthcare:   [x]English       []other:      Pain level:  3/10     SUBJECTIVE:  Pt states knees are a little sore today.          OBJECTIVE:         RESTRICTIONS/PRECAUTIONS: None     Exercises/Interventions: [address]: [address]  Therapeutic Ex (80984) HEP 4/4//22 5/3/22 5/23/22   Warm-up       Rec bike  7', Lv 6 7', Lv 6           TABLE       Heel slides x      Strap gastroc stretch x      Hs stretch x      Quad set       SLR x      Bridge x      Bridge w/ march       Melvin stretch x      Quad stretch                     STANDING       Wedge gastroc stretch  1' 1' 1'   Heel raises 1/2 roll  10x3 10x3 10x3   Toe raises 1/2 roll  10x3 10x3 10x3   HS stretch  1' 1' B 1' B   Anterior step ups  -- --    Anterior step downs  -- --    Lateral step ups  -- -- 10x2 B, 6\"   Resisted side steps  -- 10'x2 laps, GTB    Monster walks  -- --    Knee extension machine  X30, 40# X20, 45# X20, 60#   HS curl machine  X30, 50# X30, 55# X30, 60#   COREY Abduction  x20 B, 75# x20 B, 80# x20 B, 80#   COREY Extension  x20 B, 74# x20 B, 80# x20 B, 80#   COREY Flexion  x20 B, 75# x20 B, 80# x20 B, 80#   Leg Press DL  X15, 100# X20, 110# X20, 110#   Leg Press Eccentric  X15, 70# x15 B, 80# x15 B, 80#   Retrowalk CC  X15, 45# X15, 55# X15, 55#   BOSU balance  1' 1' 1'   BOSU squats  10x2 10x2 10x2   BOSU marches  -- --    BOSU step ups  x15 B x15 B x15 B   Stool push SL  20'x3 20'x4 20'x4   Stool pull DL  20\"x4 20'x4 20'x4   Ladder  x15 x15 x15   Steamboats  x20 ea, BTB x20 ea BlkTB x20 ea B BLkTB                           Manual (43884): Therapeutic Exercise and NMR EXR  [x] (89592) Provided verbal/tactile cueing for activities related to strengthening, flexibility, endurance, ROM for improvements in LE, proximal hip, and core control with self care, mobility, lifting, ambulation. [x] (72400) Provided verbal/tactile cueing for activities related to improving balance, coordination, kinesthetic sense, posture, motor skill, proprioception to assist with LE, proximal hip, and core control in self-care, mobility, lifting, ambulation and eccentric single leg control.      NMR and Therapeutic Activities:    [x] (13967 or 68583) Provided verbal/tactile cueing for activities related to improving balance, coordination, kinesthetic sense, posture, motor skill, proprioception and motor activation to allow for proper function of core, proximal hip and LE with self-care and ADLs and functional mobility.   [] (39348) Gait Re-education- Provided training and instruction to the patient for proper LE, core and proximal hip recruitment and positioning and eccentric body weight control with ambulation re-education including up and down stairs     Home Exercise Program:    [x] (46215) Reviewed/Progressed HEP activities related to strengthening, flexibility, endurance, ROM of core, proximal hip and LE for functional self-care, mobility, lifting and ambulation/stair navigation   [] (96478) Reviewed/Progressed HEP activities related to improving balance, coordination, kinesthetic sense, posture, motor skill, proprioception of core, proximal hip and LE for self-care, mobility, lifting, and ambulation/stair navigation      Manual Treatments:  PROM / STM / Oscillations-Mobs:  G-I, II, III, IV (PA's, Inf., Post.)  [x] (49290) Provided manual therapy to mobilize LE, proximal hip and/or LS spine soft tissue/joints for the purpose of modulating pain, promoting relaxation, increasing ROM, reducing/eliminating soft tissue swelling/inflammation/restriction, improving soft tissue extensibility and allowing for proper ROM for normal function with self-care, mobility, lifting and ambulation. Modalities:     [x] GAME READY (VASO)- for significant edema, swelling, pain control.   Right LE, no adverse skin reaction      Charges:  Timed Code Treatment Minutes: 53   Total Treatment Minutes:  53   BWC:  TE TIME:  NMR TIME:  MANUAL TIME:   TA  UNTIMED MINUTES:  Medicare Total:   35  10    8          [] EVAL (LOW) 31286 (typically 20 minutes face-to-face)  [] EVAL (MOD) 15853 (typically 30 minutes face-to-face)  [] EVAL (HIGH) 89906 (typically 45 minutes face-to-face)  [] RE-EVAL     [x] OD(41011) 2     [] IONTO  [x] NMR (18708) 1     [] VASO  [] Manual (79568) x     [] Dry Needle:  [x] TA 1      [] [employer] Traction (89392)  [] ES(attended) (64500)      [] ES (un) (79387):        GOALS:     Patient stated goal: Improve mobility, return to work      Therapist goals for Patient:   Short Term Goals: To be achieved in: 2 weeks   1. Independent in HEP and progression per patient tolerance, in order to prevent re-injury. []? Progressing: [x]? Met: []? Not Met: []? Adjusted      2. Patient will have a decrease in pain of NRPS to </=3/10 facilitate improvement in movement, function, and ADLs as indicated by Functional Deficits. [x]? Progressing: [x]? Met: []? Not Met: []? Adjusted      Long Term Goals: To be achieved in: 8 weeks  1. Disability index score of 30% or less for the LEFS to assist with reaching prior level of function. []? Progressing: [x]? Met: []? Not Met: []? Adjusted      2. Patient will demonstrate increased AROM to 0-130 to bilateral knees to allow for proper joint functioning as indicated by patients Functional Deficits. []? Progressing: [x]? Met: []? Not Met: []? Adjusted      3. Patient will demonstrate an increase in Strength to 5/5 to knee flex/ext allow for proper functional mobility as indicated by patients Functional Deficits. []? Progressing: [x]? Met: []? Not Met: []? Adjusted      4. Patient will return to functional walking activities with NRPS of </= 1/10. [x]? Progressing: []? Met: []? Not Met: []? Adjusted      5. Pt will be able to ascend/descend ladder with deviation to allow for return to work activities.  (patient specific functional goal)    []? Progressing: [x]? Met: []? Not Met: []? Adjusted                ASSESSMENT:  Savanna Garcia has been seen in PT for 29 visits for bilateral knee pain. Pt has responded well overall to PT session which have been addressing progressive functional strengthening and ROM. Overall mobility has increased over past few months significantly.  ROM and strength are at expected ranges. Patient received education on their current pathology and how their condition effects them with their functional activities. Patient understood discussion and questions were answered. Patient understands their activity limitations and understands rational for treatment progression. Pt educated on plan of care and HEP, if worsening symptoms to d/c that exercise. PLAN: See eval  [x] Continue per plan of care [] Alter current plan (see comments above)  [] Plan of care initiated [] Hold pending MD visit [] Discharge      Electronically signed by:  Shanika Moore PT    Note: If patient does not return for scheduled/ recommended follow up visits, this note will serve as a discharge from care along with most recent update on progress.

## 2022-05-23 NOTE — PROGRESS NOTES
[address] [place] [address] [facility], [address] [place] [address] [address]  Phone: [phone]   Fax: [phone]    Date: 2022          Patient Name; :  Wilian Staggers; 1985   Dx: Diagnosis: M22.41, M22.42 (ICD-10-CM) - Chondromalacia of both patellae; I12.296N (ICD-10-CM) - Acute lateral meniscus tear of left knee, subsequent encounter      Physician: Referring Practitioner: Coleman Hernandez MD        Total PT Visits: 29     Measures Previous Current   Pain (0-10) 5 1   Disability % 49% 30%     KNEE  AROM  Flexion 130  Extension 0    MMT  Flexion  5/5  Extension 5/5        Assessment:  Babak Tirado has been seen in PT for 29 visits for bilateral knee pain. Pt has responded well overall to PT session which have been addressing progressive functional strengthening and ROM. Overall mobility has increased over past few months significantly. ROM and strength are at expected ranges. Prognosis for POC: [x] Good [] Fair  [] Poor      Patient requires continued skilled intervention: [] Yes  [x] No    Patient stated goal: Improve mobility, return to [facility] [place]     Therapist goals for Patient:   Short Term Goals: To be achieved in: 2 weeks   1. Independent in HEP and progression per patient tolerance, in order to prevent re-injury. []?? Progressing: [x]? ? Met: []?? Not Met: []?? Adjusted      2. Patient will have a decrease in pain of NRPS to </=3/10 facilitate improvement in movement, function, and ADLs as indicated by Functional Deficits. [x]? ? Progressing: [x]? ? Met: []?? Not Met: []?? Adjusted      Long Term Goals: To be achieved in: 8 weeks  1. Disability index score of 30% or less for the LEFS to assist with reaching prior level of function.   []?? Progressing: [x]? ? Met: []?? Not Met: []?? Adjusted      2.  Patient will demonstrate increased AROM to 0-130 to bilateral knees to allow for proper joint functioning as indicated by patients Functional Deficits.   []? ? Progressing: [x]? ? Met: []?? Not Met: []?? Adjusted      3. Patient will demonstrate an increase in Strength to 5/5 to knee flex/ext allow for proper functional mobility as indicated by patients Functional Deficits. []?? Progressing: [x]? ? Met: []?? Not Met: []?? Adjusted      4. Patient will return to functional walking activities with NRPS of </= 1/10.    [x]? ? Progressing: []?? Met: []?? Not Met: []?? Adjusted      5. Pt will be able to ascend/descend ladder with deviation to allow for return to work activities.  (patient specific functional goal)    []? ? Progressing: [x]? ? Met: []?? Not Met: []?? Adjusted            Plan & Recommendations:  [] Continue rehabilitation due to objective improvement and continued functional deficits with frequency and duration:   [] Progress toward  []GAP, []Work Conditioning, []Independent HEP   [x] Discharge due to   [] All goals achieved, [x] Maximized \"medical necessity\" [] No subjective or objective improvements      Electronically signed by:  Jerica Llanes, PT  Therapy Plan of Care Re-Certification  This patient has been re-evaluated for physical therapy services and for therapy to continue, Medicare, Medicaid and other insurances require periodic physician review of the treatment plan. Please review the above re-evaluation and verify that you agree with plan of care as established above by signing the attached document and return it to our office or note changes to established plan below  [] Follow treatment plan as above [] Discontinue physical therapy  [] Change plan to:                                 __________________________________________________    Physician Signature:____________________________________ Date:____________  By signing above, therapists plan is approved by physician    If you have any questions or concerns, please don't hesitate to call.   Thank you for your referral.

## 2022-05-31 ENCOUNTER — OFFICE VISIT (OUTPATIENT)
Dept: ORTHOPEDIC SURGERY | Age: 37
End: 2022-05-31
Payer: MEDICAID

## 2022-05-31 DIAGNOSIS — M22.42 CHONDROMALACIA OF BOTH PATELLAE: Primary | ICD-10-CM

## 2022-05-31 DIAGNOSIS — M22.41 CHONDROMALACIA OF BOTH PATELLAE: Primary | ICD-10-CM

## 2022-05-31 PROCEDURE — 99212 OFFICE O/P EST SF 10 MIN: CPT | Performed by: ORTHOPAEDIC SURGERY

## 2022-05-31 PROCEDURE — G8417 CALC BMI ABV UP PARAM F/U: HCPCS | Performed by: ORTHOPAEDIC SURGERY

## 2022-05-31 PROCEDURE — G8428 CUR MEDS NOT DOCUMENT: HCPCS | Performed by: ORTHOPAEDIC SURGERY

## 2022-05-31 PROCEDURE — 4004F PT TOBACCO SCREEN RCVD TLK: CPT | Performed by: ORTHOPAEDIC SURGERY

## 2022-05-31 NOTE — PROGRESS NOTES
He returns today for evaluation. He he has ended physical therapy although they were hoping for more. At this time we will request more physical therapy. In the interim I encouraged him to become more aggressive with doing job-related type activities around the house. He says he has some home issues that he needs to take care of that require ladders which will be a good test and recommend he continue with the exercise program that they have discussed and shown him how to do on his own in the interim. At this time I feel he should be ready to return to work in 6 weeks. He should return short of that for final check. On examination of his knees today he has good range of motion no effusion minimal crepitus some pain in the parapatellar region but nothing like it was in the past.  I feel he has improved substantially and is nearing the time of which she will be MMI.   I would anticipate that would be in 6 weeks

## 2022-07-06 ENCOUNTER — OFFICE VISIT (OUTPATIENT)
Dept: ORTHOPEDIC SURGERY | Age: 37
End: 2022-07-06
Payer: MEDICAID

## 2022-07-06 DIAGNOSIS — M65.9 SYNOVITIS OF BOTH KNEE JOINTS: ICD-10-CM

## 2022-07-06 DIAGNOSIS — M22.42 CHONDROMALACIA OF BOTH PATELLAE: Primary | ICD-10-CM

## 2022-07-06 DIAGNOSIS — M22.41 CHONDROMALACIA OF BOTH PATELLAE: Primary | ICD-10-CM

## 2022-07-06 PROCEDURE — 4004F PT TOBACCO SCREEN RCVD TLK: CPT | Performed by: ORTHOPAEDIC SURGERY

## 2022-07-06 PROCEDURE — 99212 OFFICE O/P EST SF 10 MIN: CPT | Performed by: ORTHOPAEDIC SURGERY

## 2022-07-06 PROCEDURE — G8427 DOCREV CUR MEDS BY ELIG CLIN: HCPCS | Performed by: ORTHOPAEDIC SURGERY

## 2022-07-06 PROCEDURE — G8417 CALC BMI ABV UP PARAM F/U: HCPCS | Performed by: ORTHOPAEDIC SURGERY

## 2022-07-06 NOTE — PROGRESS NOTES
Returns today for evaluation. He actually is nearly maximized with his improvement. He is continue to do home exercise program which he is progressing with him at this time I feel he should be ready to return to work on 1 August 2022, without restriction.   He should return periodically

## 2022-07-06 NOTE — LETTER
[address] [place] [address]  Phone: [phone]  Fax: [phone]    Sharon Friedman MD        July 6, 2022     Patient: Gloria Sandhu   YOB: 1985   Date of Visit: 7/6/2022       To Whom It May Concern: It is my medical opinion that Cassidy Overton may return to work on 08/01/2022. If you have any questions or concerns, please don't hesitate to call.     Sincerely,        Sharon Friedman MD

## 2022-12-22 ENCOUNTER — HOSPITAL ENCOUNTER (OUTPATIENT)
Age: 37
Discharge: HOME | End: 2022-12-22

## 2022-12-23 ENCOUNTER — HOSPITAL ENCOUNTER (OUTPATIENT)
Age: 37
Discharge: HOME | End: 2022-12-23
Payer: COMMERCIAL

## 2022-12-23 LAB — C DIFF TOXIN/ANTIGEN: NORMAL

## 2022-12-23 PROCEDURE — 83993 ASSAY FOR CALPROTECTIN FECAL: CPT

## 2022-12-23 PROCEDURE — 82705 FATS/LIPIDS FECES QUAL: CPT

## 2022-12-23 PROCEDURE — 87506 IADNA-DNA/RNA PROBE TQ 6-11: CPT

## 2022-12-23 PROCEDURE — 87336 ENTAMOEB HIST DISPR AG IA: CPT

## 2022-12-23 PROCEDURE — 87328 CRYPTOSPORIDIUM AG IA: CPT

## 2022-12-23 PROCEDURE — 87324 CLOSTRIDIUM AG IA: CPT

## 2022-12-23 PROCEDURE — 87449 NOS EACH ORGANISM AG IA: CPT

## 2022-12-24 LAB
CRYPTOSPORIDIUM ANTIGEN STOOL: NORMAL
E HISTOLYTICA ANTIGEN STOOL: NORMAL
FECAL NEUTRAL FAT: NORMAL
FECAL SPLIT FATS: NORMAL
GI BACTERIAL PATHOGENS BY PCR: NORMAL
GIARDIA ANTIGEN STOOL: NORMAL

## 2022-12-29 ENCOUNTER — HOSPITAL ENCOUNTER (EMERGENCY)
Age: 37
Discharge: HOME | End: 2022-12-29
Attending: EMERGENCY MEDICINE
Payer: COMMERCIAL

## 2022-12-29 VITALS
BODY MASS INDEX: 32.06 KG/M2 | RESPIRATION RATE: 16 BRPM | WEIGHT: 229 LBS | DIASTOLIC BLOOD PRESSURE: 78 MMHG | SYSTOLIC BLOOD PRESSURE: 118 MMHG | HEART RATE: 89 BPM | HEIGHT: 71 IN | TEMPERATURE: 98.9 F | OXYGEN SATURATION: 100 %

## 2022-12-29 DIAGNOSIS — R19.7 DIARRHEA, UNSPECIFIED TYPE: Primary | ICD-10-CM

## 2022-12-29 LAB
A/G RATIO: 1.9 (ref 1.1–2.2)
ALBUMIN SERPL-MCNC: 4.6 G/DL (ref 3.4–5)
ALP BLD-CCNC: 104 U/L (ref 40–129)
ALT SERPL-CCNC: 24 U/L (ref 10–40)
ANION GAP SERPL CALCULATED.3IONS-SCNC: 11 MMOL/L (ref 3–16)
AST SERPL-CCNC: 22 U/L (ref 15–37)
BASOPHILS ABSOLUTE: 0.1 K/UL (ref 0–0.2)
BASOPHILS RELATIVE PERCENT: 0.5 %
BILIRUB SERPL-MCNC: 0.5 MG/DL (ref 0–1)
BILIRUBIN URINE: NEGATIVE
BLOOD, URINE: NEGATIVE
BUN BLDV-MCNC: 14 MG/DL (ref 7–20)
CALCIUM SERPL-MCNC: 9.8 MG/DL (ref 8.3–10.6)
CHLORIDE BLD-SCNC: 100 MMOL/L (ref 99–110)
CLARITY: CLEAR
CO2: 25 MMOL/L (ref 21–32)
COLOR: YELLOW
CREAT SERPL-MCNC: 1.2 MG/DL (ref 0.9–1.3)
EOSINOPHILS ABSOLUTE: 0 K/UL (ref 0–0.6)
EOSINOPHILS RELATIVE PERCENT: 0.3 %
GFR SERPL CREATININE-BSD FRML MDRD: >60 ML/MIN/{1.73_M2}
GLUCOSE BLD-MCNC: 97 MG/DL (ref 70–99)
GLUCOSE URINE: NEGATIVE MG/DL
HCT VFR BLD CALC: 47.1 % (ref 40.5–52.5)
HEMOGLOBIN: 16.5 G/DL (ref 13.5–17.5)
KETONES, URINE: NEGATIVE MG/DL
LACTIC ACID, SEPSIS: 1.3 MMOL/L (ref 0.4–1.9)
LEUKOCYTE ESTERASE, URINE: NEGATIVE
LIPASE: 29 U/L (ref 13–60)
LYMPHOCYTES ABSOLUTE: 0.7 K/UL (ref 1–5.1)
LYMPHOCYTES RELATIVE PERCENT: 6.1 %
MCH RBC QN AUTO: 30.6 PG (ref 26–34)
MCHC RBC AUTO-ENTMCNC: 35.1 G/DL (ref 31–36)
MCV RBC AUTO: 87.1 FL (ref 80–100)
MICROSCOPIC EXAMINATION: NORMAL
MONOCYTES ABSOLUTE: 0.7 K/UL (ref 0–1.3)
MONOCYTES RELATIVE PERCENT: 5.5 %
NEUTROPHILS ABSOLUTE: 10.7 K/UL (ref 1.7–7.7)
NEUTROPHILS RELATIVE PERCENT: 87.6 %
NITRITE, URINE: NEGATIVE
PDW BLD-RTO: 13.3 % (ref 12.4–15.4)
PH UA: 5.5 (ref 5–8)
PLATELET # BLD: 243 K/UL (ref 135–450)
PMV BLD AUTO: 8.2 FL (ref 5–10.5)
POTASSIUM REFLEX MAGNESIUM: 4.7 MMOL/L (ref 3.5–5.1)
PROTEIN UA: NEGATIVE MG/DL
RAPID INFLUENZA  B AGN: NEGATIVE
RAPID INFLUENZA A AGN: NEGATIVE
RBC # BLD: 5.41 M/UL (ref 4.2–5.9)
SARS-COV-2, NAAT: NOT DETECTED
SODIUM BLD-SCNC: 136 MMOL/L (ref 136–145)
SPECIFIC GRAVITY UA: 1.01 (ref 1–1.03)
TOTAL PROTEIN: 7 G/DL (ref 6.4–8.2)
URINE REFLEX TO CULTURE: NORMAL
URINE TYPE: NORMAL
UROBILINOGEN, URINE: 0.2 E.U./DL
WBC # BLD: 12.2 K/UL (ref 4–11)

## 2022-12-29 PROCEDURE — 99284 EMERGENCY DEPT VISIT MOD MDM: CPT

## 2022-12-29 PROCEDURE — 96361 HYDRATE IV INFUSION ADD-ON: CPT

## 2022-12-29 PROCEDURE — 36415 COLL VENOUS BLD VENIPUNCTURE: CPT

## 2022-12-29 PROCEDURE — 87635 SARS-COV-2 COVID-19 AMP PRB: CPT

## 2022-12-29 PROCEDURE — 87804 INFLUENZA ASSAY W/OPTIC: CPT

## 2022-12-29 PROCEDURE — 80053 COMPREHEN METABOLIC PANEL: CPT

## 2022-12-29 PROCEDURE — 83690 ASSAY OF LIPASE: CPT

## 2022-12-29 PROCEDURE — 83605 ASSAY OF LACTIC ACID: CPT

## 2022-12-29 PROCEDURE — 85025 COMPLETE CBC W/AUTO DIFF WBC: CPT

## 2022-12-29 PROCEDURE — 6360000002 HC RX W HCPCS: Performed by: EMERGENCY MEDICINE

## 2022-12-29 PROCEDURE — 2580000003 HC RX 258: Performed by: EMERGENCY MEDICINE

## 2022-12-29 PROCEDURE — 96374 THER/PROPH/DIAG INJ IV PUSH: CPT

## 2022-12-29 PROCEDURE — 81003 URINALYSIS AUTO W/O SCOPE: CPT

## 2022-12-29 RX ORDER — 0.9 % SODIUM CHLORIDE 0.9 %
1000 INTRAVENOUS SOLUTION INTRAVENOUS ONCE
Status: COMPLETED | OUTPATIENT
Start: 2022-12-29 | End: 2022-12-29

## 2022-12-29 RX ORDER — ONDANSETRON 2 MG/ML
4 INJECTION INTRAMUSCULAR; INTRAVENOUS ONCE
Status: COMPLETED | OUTPATIENT
Start: 2022-12-29 | End: 2022-12-29

## 2022-12-29 RX ADMIN — ONDANSETRON HYDROCHLORIDE 4 MG: 2 INJECTION, SOLUTION INTRAMUSCULAR; INTRAVENOUS at 18:44

## 2022-12-29 RX ADMIN — SODIUM CHLORIDE 1000 ML: 9 INJECTION, SOLUTION INTRAVENOUS at 18:43

## 2022-12-29 ASSESSMENT — PAIN DESCRIPTION - ORIENTATION: ORIENTATION: UPPER

## 2022-12-29 ASSESSMENT — PAIN - FUNCTIONAL ASSESSMENT: PAIN_FUNCTIONAL_ASSESSMENT: 0-10

## 2022-12-29 ASSESSMENT — PAIN DESCRIPTION - PAIN TYPE: TYPE: ACUTE PAIN

## 2022-12-29 ASSESSMENT — PAIN SCALES - GENERAL: PAINLEVEL_OUTOF10: 4

## 2022-12-29 ASSESSMENT — PAIN DESCRIPTION - FREQUENCY: FREQUENCY: CONTINUOUS

## 2022-12-29 ASSESSMENT — PAIN DESCRIPTION - LOCATION: LOCATION: ABDOMEN

## 2022-12-29 ASSESSMENT — PAIN DESCRIPTION - DESCRIPTORS: DESCRIPTORS: DULL

## 2022-12-29 NOTE — LETTER
[address] [facility] Emergency Department  [facility] [address]  Phone: [phone]             December 29, 2022    Patient: Deonna Powell   YOB: 1985   Date of Visit: 12/29/2022       To Whom It May Concern:    Jeannie Altagracia was seen and treated in our emergency department on 12/29/2022.    Sincerely,             Signature:__________________________________

## 2022-12-29 NOTE — ED NOTES
Release for CT results from [facility] signed by patient and faxed to [facility], RN  12/29/22 7489

## 2022-12-30 NOTE — ED NOTES
Discharge instructions and medications reviewed with patient. Patient verbalized understanding of medications and follow up. All questions answered at this time. IV removed, dressing applied, and bleeding controlled. Skin warm, pink, and dry. Patient alert and oriented x4. Pt ambulated to lobby with a stable gait. Patient discharged home with 0 prescriptions.        Dolly Mandujano RN  12/29/22 2133

## 2022-12-30 NOTE — ED NOTES
CT results faced from [facility] and given to Dr. Oriana Ramsey, RN  12/29/22 9000

## 2022-12-30 NOTE — DISCHARGE INSTRUCTIONS
Increase your fluid intake at home. Your lab work was reassuring today. We were able to review your CAT scan from last month which did show some mild colitis. Keep your gastroenterology follow-up appointment and your scheduled colonoscopy next week. If you develop fevers or feel you are worsening return to the ER immediately.

## 2022-12-30 NOTE — ED PROVIDER NOTES
[address] [facility]. [facility] Emergency Department      CHIEF COMPLAINT  Abdominal Pain (Pt reports upper abd pain with diarrhea and vomiting. Pt also c/o headache, body aches, hot/cold sweats and increase in fatigue.  )      HISTORY OF PRESENT ILLNESS  Deonna Powell is a 44 y.o. male with a history of anxiety and GERD presents with upper abdominal pain with associated vomiting and diarrhea. He also has had some chills and sweats and body aches at home. He states this has been a recurrent issue for him and he was actually seen recently at [facility] ER and had a CT scan that showed some inflammation in his colon. He is scheduled to see a gastroenterologist for colonoscopy next week. He will be seeing Dr. Fidencio Villegas. He states he the same episodes he has been having but it seems like the diarrhea will improve for several days and then will flareup again. There is not been any blood in his diarrhea or vomit. No documented fevers. .   No other complaints, modifying factors or associated symptoms. I have reviewed the following from the nursing documentation.     Past Medical History:   Diagnosis Date    Anxiety     GERD (gastroesophageal reflux disease)     Hx of blood clots     post left knee surgery     Past Surgical History:   Procedure Laterality Date    CARDIAC CATHETERIZATION      KNEE ARTHROSCOPY Left 8/16/2021    LEFT KNEE VIDEO ARTHROSCOPY, CHONDROPLASTY OF PATELLA, SYNOVECTOMY performed by Meenu Cedeño MD at [address] [place] [address] ARTHROSCOPY Right 12/27/2021    RIGHT KNEE VIDEO ARTHROSCOPY CHONDROPLASTY OF PATELLA performed by Meenu Cedeño MD at [address]    OTHER SURGICAL HISTORY  08/16/2021    LEFT KNEE VIDEO ARTHROSCOPY, CHONDROPLASTY OF PATELLA, SYNOVECTOMY     OTHER SURGICAL HISTORY      LEFT KNEE VIDEO ARTHROSCOPY, CHONDROPLASTY OF PATELLA, SYNOVECTOMY    OTHER SURGICAL HISTORY  12/27/2021    RIGHT KNEE VIDEO ARTHROSCOPY CHONDROPLASTY OF PATELLA (Right Knee SHOULDER SURGERY       History reviewed. No pertinent family history. Social History     Socioeconomic History    Marital status:      Spouse name: Not on file    Number of children: Not on file    Years of education: Not on file    Highest education level: Not on file   Occupational History    Not on file   Tobacco Use    Smoking status: Every Day     Packs/day: 1.50     Types: Cigarettes    Smokeless tobacco: Never   Vaping Use    Vaping Use: Never used   Substance and Sexual Activity    Alcohol use: Yes     Comment: rarely    Drug use: No    Sexual activity: Yes     Partners: Female   Other Topics Concern    Not on file   Social History Narrative    Not on file     Social Determinants of Health     Financial Resource Strain: Not on file   Food Insecurity: Not on file   Transportation Needs: Not on file   Physical Activity: Not on file   Stress: Not on file   Social Connections: Not on file   Intimate Partner Violence: Not on file   Housing Stability: Not on file     No current facility-administered medications for this encounter.      Current Outpatient Medications   Medication Sig Dispense Refill    FLUoxetine (PROZAC) 20 MG capsule       pantoprazole (PROTONIX) 40 MG tablet Take 40 mg by mouth every morning (before breakfast)       No Known Allergies    REVIEW OF SYSTEMS      General:  No fevers  Eyes:  No recent vison changes  ENT:  No sore throat, no nasal congestion  Cardiovascular:  no palpitations  Respiratory:   no cough, no wheezing  Gastrointestinal: Epigastric abdominal pain, diarrhea and vomiting-nonbloody  Musculoskeletal:  No muscle pain, no joint pain  Skin:  No rash   Neurologic:  No speech problems, no headache, no extremity numbness, no extremity weakness  Genitourinary:  No dysuria  Extremities:  no edema, no pain      Unless otherwise stated in this report, this patient's positive and negative responses for review of systems (constitutional, eyes, ENT, cardiovascular, respiratory, gastrointestinal, neurological, genitourinary, musculoskeletal, integument systems and systems related to the presenting problem) are either stated in the preceding paragraph, were not pertinent or were negative for the symptoms and/or complaints related to the medical problem. PHYSICAL EXAM  /78   Pulse 89   Temp 98.9 °F (37.2 °C) (Oral)   Resp 16   Ht 5' 11\" (1.803 m)   Wt 229 lb (103.9 kg)   SpO2 100%   BMI 31.94 kg/m²   GENERAL APPEARANCE: Awake and alert. Cooperative. No acute distress. HEAD: Normocephalic. Atraumatic. EYES: PERRL. EOM's grossly intact. ENT: Mucous membranes are moist.   NECK: Supple, trachea midline. HEART: RRR. LUNGS: Respirations unlabored. CTAB. Good air exchange. No wheezes, rales, or rhonchi. Speaking comfortably in full sentences. ABDOMEN: Soft. Non-distended. Mild epigastric tenderness. No guarding or rebound. EXTREMITIES: No peripheral edema. MAEE. No acute deformities. SKIN: Warm, dry and intact. No acute rashes. NEUROLOGICAL: Alert and oriented X 3. CN II-XII grossly intact. PSYCHIATRIC: Normal mood and affect. LABS  I have reviewed all labs for this visit.    Results for orders placed or performed during the hospital encounter of 12/29/22   Rapid influenza A/B antigens    Specimen: Nasopharyngeal   Result Value Ref Range    Rapid Influenza A Ag Negative Negative    Rapid Influenza B Ag Negative Negative   COVID-19, Rapid    Specimen: Nasopharyngeal Swab   Result Value Ref Range    SARS-CoV-2, NAAT Not Detected Not Detected   Urinalysis with Reflex to Culture    Specimen: Urine, clean catch   Result Value Ref Range    Color, UA Yellow Straw/Yellow    Clarity, UA Clear Clear    Glucose, Ur Negative Negative mg/dL    Bilirubin Urine Negative Negative    Ketones, Urine Negative Negative mg/dL    Specific Gravity, UA 1.015 1.005 - 1.030    Blood, Urine Negative Negative    pH, UA 5.5 5.0 - 8.0    Protein, UA Negative Negative mg/dL    Urobilinogen, Urine 0.2 <2.0 E.U./dL    Nitrite, Urine Negative Negative    Leukocyte Esterase, Urine Negative Negative    Microscopic Examination Not Indicated     Urine Type NotGiven     Urine Reflex to Culture Not Indicated    Lipase   Result Value Ref Range    Lipase 29.0 13.0 - 60.0 U/L   Lactate, Sepsis   Result Value Ref Range    Lactic Acid, Sepsis 1.3 0.4 - 1.9 mmol/L   CBC with Auto Differential   Result Value Ref Range    WBC 12.2 (H) 4.0 - 11.0 K/uL    RBC 5.41 4.20 - 5.90 M/uL    Hemoglobin 16.5 13.5 - 17.5 g/dL    Hematocrit 47.1 40.5 - 52.5 %    MCV 87.1 80.0 - 100.0 fL    MCH 30.6 26.0 - 34.0 pg    MCHC 35.1 31.0 - 36.0 g/dL    RDW 13.3 12.4 - 15.4 %    Platelets 986 614 - 795 K/uL    MPV 8.2 5.0 - 10.5 fL    Neutrophils % 87.6 %    Lymphocytes % 6.1 %    Monocytes % 5.5 %    Eosinophils % 0.3 %    Basophils % 0.5 %    Neutrophils Absolute 10.7 (H) 1.7 - 7.7 K/uL    Lymphocytes Absolute 0.7 (L) 1.0 - 5.1 K/uL    Monocytes Absolute 0.7 0.0 - 1.3 K/uL    Eosinophils Absolute 0.0 0.0 - 0.6 K/uL    Basophils Absolute 0.1 0.0 - 0.2 K/uL   Comprehensive Metabolic Panel w/ Reflex to MG   Result Value Ref Range    Sodium 136 136 - 145 mmol/L    Potassium reflex Magnesium 4.7 3.5 - 5.1 mmol/L    Chloride 100 99 - 110 mmol/L    CO2 25 21 - 32 mmol/L    Anion Gap 11 3 - 16    Glucose 97 70 - 99 mg/dL    BUN 14 7 - 20 mg/dL    Creatinine 1.2 0.9 - 1.3 mg/dL    Est, Glom Filt Rate >60 >60    Calcium 9.8 8.3 - 10.6 mg/dL    Total Protein 7.0 6.4 - 8.2 g/dL    Albumin 4.6 3.4 - 5.0 g/dL    Albumin/Globulin Ratio 1.9 1.1 - 2.2    Total Bilirubin 0.5 0.0 - 1.0 mg/dL    Alkaline Phosphatase 104 40 - 129 U/L    ALT 24 10 - 40 U/L    AST 22 15 - 37 U/L               RADIOLOGY  X-RAYS: ALL IMAGES INCLUDING PLAIN FILMS, CT, ULTRASOUND AND MRI HAVE BEEN READ BY THE RADIOLOGIST. I have personally reviewed plain film images and have reviewed the radiology reports.   No orders to display              Rechecks: Physical assessment performed. Patient is feeling better after IV fluids and Zofran IV here. He was sleeping on reassessment. Sepsis:  Is this patient to be included in the SEP-1 Core Measure due to severe sepsis or septic shock? No   Exclusion criteria - the patient is NOT to be included for SEP-1 Core Measure due to: Infection is not suspected         ED COURSE/MDM  Patient seen and evaluated. Here the patient is afebrile with normal vitals signs. Old records reviewed, including CT results from [facility] ER from November 21, 2022. He had evidence of mild fatty infiltration of the wall of the right colon and sigmoid colon consistent with mild chronic colitis. No diverticulitis or appendicitis. .  Today his lab work is reassuring only with very minimal leukocytosis. Lactic acid is normal.  He is clear that this is the same pain he has been having the same episodes of diarrhea he has been having. He is already arranged to see GI and have a colonoscopy in the next week. He feels better after IV fluids and Zofran here. I do not think he needs additional imaging today. I do think he is appropriate to follow-up with GI as scheduled. The patient is comfortable with this plan. Labs and imaging reviewed and results discussed with patient. Patient was reassessed as noted above . Plan of care discussed with patient. Patient in agreement with plan. Strict return precautions have been given. Patient was given scripts for the following medications. I counseled patient how to take these medications. Discharge Medication List as of 12/29/2022  7:35 PM          No prescriptions given. CLINICAL IMPRESSION  1. Diarrhea, unspecified type        Blood pressure 118/78, pulse 89, temperature 98.9 °F (37.2 °C), temperature source Oral, resp. rate 16, height 5' 11\" (1.803 m), weight 229 lb (103.9 kg), SpO2 100 %. DISPOSITION  Kash Tadeo was discharged to home in stable condition.     Lisette Brady MD am the primary clinician of record.     (Please note this note was completed with a voice recognition program.  Efforts were made to edit the dictations but occasionally words are mis-transcribed.)        Radhika Santiago MD  01/01/23 [id number]

## 2023-03-14 ENCOUNTER — HOSPITAL ENCOUNTER (OUTPATIENT)
Age: 38
Discharge: HOME | End: 2023-03-14
Payer: COMMERCIAL

## 2023-03-14 ENCOUNTER — HOSPITAL ENCOUNTER (OUTPATIENT)
Dept: GENERAL RADIOLOGY | Age: 38
Discharge: HOME | End: 2023-03-14
Payer: COMMERCIAL

## 2023-03-14 DIAGNOSIS — M25.522 LEFT ELBOW PAIN: ICD-10-CM

## 2023-03-14 DIAGNOSIS — M25.512 PAIN, JOINT, SHOULDER, LEFT: ICD-10-CM

## 2023-03-14 PROCEDURE — 73080 X-RAY EXAM OF ELBOW: CPT

## 2023-03-14 PROCEDURE — 73030 X-RAY EXAM OF SHOULDER: CPT

## 2023-03-15 SDOH — HEALTH STABILITY: PHYSICAL HEALTH: ON AVERAGE, HOW MANY DAYS PER WEEK DO YOU ENGAGE IN MODERATE TO STRENUOUS EXERCISE (LIKE A BRISK WALK)?: 2 DAYS

## 2023-03-15 SDOH — HEALTH STABILITY: PHYSICAL HEALTH: ON AVERAGE, HOW MANY MINUTES DO YOU ENGAGE IN EXERCISE AT THIS LEVEL?: 20 MIN

## 2023-03-16 ENCOUNTER — OFFICE VISIT (OUTPATIENT)
Dept: ORTHOPEDIC SURGERY | Age: 38
End: 2023-03-16

## 2023-03-16 VITALS — WEIGHT: 229 LBS | HEIGHT: 71 IN | BODY MASS INDEX: 32.06 KG/M2

## 2023-03-16 DIAGNOSIS — G25.89 SCAPULAR DYSKINESIS: ICD-10-CM

## 2023-03-16 DIAGNOSIS — M25.512 LEFT SHOULDER PAIN, UNSPECIFIED CHRONICITY: ICD-10-CM

## 2023-03-16 DIAGNOSIS — M75.82 TENDINITIS OF LEFT ROTATOR CUFF: Primary | ICD-10-CM

## 2023-03-16 DIAGNOSIS — M25.512 TRIGGER POINT OF LEFT SHOULDER REGION: ICD-10-CM

## 2023-03-16 RX ORDER — PREDNISONE 10 MG/1
TABLET ORAL
COMMUNITY
Start: 2023-03-14

## 2023-03-16 RX ORDER — MELOXICAM 15 MG/1
15 TABLET ORAL DAILY PRN
Qty: 30 TABLET | Refills: 0 | Status: SHIPPED | OUTPATIENT
Start: 2023-03-16

## 2023-03-16 RX ORDER — METHOCARBAMOL 500 MG/1
TABLET, FILM COATED ORAL
COMMUNITY
Start: 2023-03-14

## 2023-03-16 NOTE — PROGRESS NOTES
ORTHOPAEDIC SURGERY H&P / CONSULTATION NOTE    Chief complaint:   Chief Complaint   Patient presents with    Shoulder Pain     L Shoulder Pn      History of present illness: The patient is a 44 y.o. male right hand dominant with subjective symptoms of right shoulder pain. The chief complaint is located at lateral aspect of the right shoulder also posterior based along the periscapular region. Duration of symptoms has been for 2 to 3 weeks. The severity of symptoms is rated at 5-6/10 pain on intake form. Patient states his overall shoulder pain has been for the last 2 to 3 weeks. He states that he fell off a plastic bucket in early January 2023. He landed on his left side. He went to chiropractor to work on his hips he states and he felt that he been doing better for roughly the next 6 to 8 weeks however he has noticed an increase in left shoulder pain. He states discomfort with ADLs, this can be a dull throbbing aching pain but more so sharp pain lateral base as well as posterior periscapular region. Denies gross instability. He states previous left shoulder surgery in 2018 at [facility]. He is here for an additional opinion and evaluation and treatment    The patient has tried the below listed items prior to today's consultation for above listed chief complaint. - -   Over-the-counter anti-inflammatories/prescription medication anti-inflammatory.      -   Physical therapy / guided home exercise program -     -   Previous corticosteroid injections    Past medical history:    Past Medical History:   Diagnosis Date    Anxiety     GERD (gastroesophageal reflux disease)     Hx of blood clots     post left knee surgery        Past surgical history:    Past Surgical History:   Procedure Laterality Date    CARDIAC CATHETERIZATION      KNEE ARTHROSCOPY Left 8/16/2021    LEFT KNEE VIDEO ARTHROSCOPY, CHONDROPLASTY OF PATELLA, SYNOVECTOMY performed by Jose Alfaro MD at [address] [place] [address],[address] Right 12/27/2021    RIGHT KNEE VIDEO ARTHROSCOPY CHONDROPLASTY OF PATELLA performed by Julián Smart MD at [address]    OTHER SURGICAL HISTORY  08/16/2021    LEFT KNEE VIDEO ARTHROSCOPY, CHONDROPLASTY OF PATELLA, SYNOVECTOMY     OTHER SURGICAL HISTORY      LEFT KNEE VIDEO ARTHROSCOPY, CHONDROPLASTY OF PATELLA, SYNOVECTOMY    OTHER SURGICAL HISTORY  12/27/2021    RIGHT KNEE VIDEO ARTHROSCOPY CHONDROPLASTY OF PATELLA (Right Knee    SHOULDER SURGERY          Allergies:  No Known Allergies      Medications:   Current Outpatient Medications:     predniSONE (DELTASONE) 10 MG tablet, , Disp: , Rfl:     methocarbamol (ROBAXIN) 500 MG tablet, , Disp: , Rfl:     FLUoxetine (PROZAC) 20 MG capsule, , Disp: , Rfl:     pantoprazole (PROTONIX) 40 MG tablet, Take 40 mg by mouth every morning (before breakfast), Disp: , Rfl:      Social history: Denies IV drug use. Social History     Socioeconomic History    Marital status:      Spouse name: Not on file    Number of children: Not on file    Years of education: Not on file    Highest education level: Not on file   Occupational History    Not on file   Tobacco Use    Smoking status: Every Day     Packs/day: 1.50     Types: Cigarettes    Smokeless tobacco: Never   Vaping Use    Vaping Use: Never used   Substance and Sexual Activity    Alcohol use: Yes     Comment: rarely    Drug use: No    Sexual activity: Yes     Partners: Female   Other Topics Concern    Not on file   Social History Narrative    Not on file     Social Determinants of Health     Financial Resource Strain: Not on file   Food Insecurity: Not on file   Transportation Needs: Not on file   Physical Activity: Insufficiently Active    Days of Exercise per Week: 2 days    Minutes of Exercise per Session: 20 min   Stress: Not on file   Social Connections: Not on file   Intimate Partner Violence: Not At Risk    Fear of Current or Ex-Partner: No    Emotionally Abused: No    Physically Abused:  No Sexually Abused: No   Housing Stability: Not on file     Tobacco use. Social History     Tobacco Use   Smoking Status Every Day    Packs/day: 1.50    Types: Cigarettes   Smokeless Tobacco Never     Employment: Noncontributory    Workers compensation claim: Noncontributory    Review of systems: Patient denies any fevers chills chest pain shortness of breath nausea vomiting significant weight loss any change in voiding or bowel movements. Patient denies any significant numbness or tingling at baseline as it relates to this presenting symptom/chief complaint. The patient denies any significant problems with skin or any significant allergies. Physical examination:  Body mass index is 31.94 kg/m². AAOx3, NCAT  EOMI  MMM  RR  Unlabored breathing, no wheezing  Skin intact BUE and BLE, warm and moist  Bilateral upper extremity examination specific to subjective symptoms  Negative Spurling  Exam Left Shoulder  Active Range of Motion (FF/Abd/ER/IR)        160/160/40/L2 limited secondary to discomfort             Passive Range of Motion (FF/Abd/ER/IR)     170/170  Positive   Neer,    positive Tomas,      5/5 albeit with discomfort   empty Can,          5/5 albeit with discomfort ER arm at the side,       5/5   belly Press,      5/5 bear Hug,       equivocal O'Briens,      none TTP at Biceps Tendon Sheath,     negative Speed, negative Yergeson, none   TTP AC Joint, negative cross arm adduction,        positive trigger points periscapular upper trapezius with associated scapular dyskinesis left greater than right   with poor shoulder posture and protraction                  Skin intact throughout  5/5 D B T G IO EPL  SILT Ax, R, U, M  +2 radial pulse    Diagnostic imaging:  MY READ:  2 view left shoulder 3/14/2023 and 2 view left shoulder 3/16/2023: Negative fracture. No gross arthrosis glenohumeral joint, suspect potential previous distal clavicle resection.   Centered and aligned glenohumeral joint    Pertinent lab work: None     Diagnosis Orders   1. Tendinitis of left rotator cuff  Ambulatory referral to Physical Therapy    Ambulatory referral to Physical Therapy      2. Trigger point of left shoulder region  Ambulatory referral to Physical Therapy    Ambulatory referral to Physical Therapy      3. Scapular dyskinesis  Ambulatory referral to Physical Therapy    Ambulatory referral to Physical Therapy      4. Left shoulder pain, unspecified chronicity  XR SHOULDER LEFT (MIN 2 VIEWS)    Ambulatory referral to Physical Therapy          Assessment and plan: 44 y.o. male with current subjective symptoms and physical exam findings with diagnostic imaging correlating to left shoulder rotator cuff tendinitis, trigger points and scapular dyskinesis. -Time of 23 minutes was spent coordinating and discussing the clinical findings, reviewing diagnostic imaging as indicated, coordinating care with prior notes review and current clinical encounter documentation as it pertains to the patient's presenting subjective symptoms and diagnoses. -I reviewed with the patient the imaging findings as well as clinical exam and  how it correlates to subjective symptoms.  -Additional time was taken to review previous radiographs obtained from outside evaluation in addition to today's radiographs and reviewed with the patient his previous medical history with prior surgery in 2018  -I asked for him to gain his operative report from his treating surgeon and bring it to his next follow-up appointment.  -I reviewed with him consideration for Mobic 15 mg p.o. daily as needed pain and OTC Tylenol per bottle as needed discomfort  -Activity modifications recommended to include low impact  -Formal physical therapy was prescribed and the patient will follow-up for 3-5 visits to obtain a good home exercise program however in the short-term he was provided a physician directed physical therapy program to include Thera-Band's.   Formal therapy will also work on scapular reconditioning and posture as he has scapular dyskinesis and poor shoulder posture with protraction  -All questions answered to the patient's satisfaction and the patient expressed understanding and agreement with the above listed treatment plan  -Follow up in 4 to 6 weeks time  -Thank you for the clinical consultation and allowing me to participate in the patient's care. Electronically signed by Shola Tinoco MD on 3/16/23 at 9:36 AM EDT         Shola Tinoco MD       Orthopaedic Surgery-Sports Medicine        Disclaimer: This note was dictated with voice recognition software. Though review and correction are routinely performed, please contact the office/medical records for any errors requiring correction.

## 2023-05-17 ENCOUNTER — TELEPHONE (OUTPATIENT)
Dept: ORTHOPEDIC SURGERY | Age: 38
End: 2023-05-17

## 2023-07-21 ENCOUNTER — TELEPHONE (OUTPATIENT)
Dept: ORTHOPEDIC SURGERY | Age: 38
End: 2023-07-21

## 2023-07-21 ENCOUNTER — HOSPITAL ENCOUNTER (OUTPATIENT)
Dept: GENERAL RADIOLOGY | Age: 38
Discharge: HOME | End: 2023-07-21
Payer: MEDICAID

## 2023-07-21 ENCOUNTER — HOSPITAL ENCOUNTER (OUTPATIENT)
Age: 38
Discharge: HOME | End: 2023-07-21
Payer: MEDICAID

## 2023-07-21 DIAGNOSIS — M25.532 LEFT WRIST PAIN: ICD-10-CM

## 2023-07-21 PROCEDURE — 73110 X-RAY EXAM OF WRIST: CPT

## 2024-05-23 PROBLEM — M47.812 CERVICAL SPONDYLOSIS WITHOUT MYELOPATHY: Status: ACTIVE | Noted: 2024-05-23

## 2024-05-23 PROBLEM — M54.12 CERVICAL RADICULOPATHY: Status: ACTIVE | Noted: 2024-05-23

## 2024-06-23 ENCOUNTER — HOSPITAL ENCOUNTER (EMERGENCY)
Age: 39
Discharge: HOME | End: 2024-06-23
Payer: COMMERCIAL

## 2024-06-23 ENCOUNTER — APPOINTMENT (OUTPATIENT)
Dept: GENERAL RADIOLOGY | Age: 39
End: 2024-06-23
Payer: COMMERCIAL

## 2024-06-23 VITALS
OXYGEN SATURATION: 98 % | DIASTOLIC BLOOD PRESSURE: 80 MMHG | WEIGHT: 249.2 LBS | BODY MASS INDEX: 34.89 KG/M2 | HEART RATE: 64 BPM | TEMPERATURE: 98.1 F | RESPIRATION RATE: 16 BRPM | SYSTOLIC BLOOD PRESSURE: 105 MMHG | HEIGHT: 71 IN

## 2024-06-23 DIAGNOSIS — R74.8 ELEVATED LIPASE: ICD-10-CM

## 2024-06-23 DIAGNOSIS — R53.81 MALAISE AND FATIGUE: ICD-10-CM

## 2024-06-23 DIAGNOSIS — R53.83 MALAISE AND FATIGUE: ICD-10-CM

## 2024-06-23 DIAGNOSIS — E86.0 DEHYDRATION: ICD-10-CM

## 2024-06-23 DIAGNOSIS — R05.1 ACUTE COUGH: ICD-10-CM

## 2024-06-23 DIAGNOSIS — U07.1 COVID-19 VIRUS INFECTION: Primary | ICD-10-CM

## 2024-06-23 LAB
ALBUMIN SERPL-MCNC: 3.7 G/DL (ref 3.4–5)
ALBUMIN/GLOB SERPL: 1.5 {RATIO} (ref 1.1–2.2)
ALP SERPL-CCNC: 86 U/L (ref 40–129)
ALT SERPL-CCNC: 16 U/L (ref 10–40)
ANION GAP SERPL CALCULATED.3IONS-SCNC: 11 MMOL/L (ref 3–16)
AST SERPL-CCNC: 17 U/L (ref 15–37)
BASOPHILS # BLD: 0 K/UL (ref 0–0.2)
BASOPHILS NFR BLD: 0.4 %
BILIRUB SERPL-MCNC: <0.2 MG/DL (ref 0–1)
BILIRUB UR QL STRIP.AUTO: NEGATIVE
BUN SERPL-MCNC: 23 MG/DL (ref 7–20)
CALCIUM SERPL-MCNC: 8.2 MG/DL (ref 8.3–10.6)
CHLORIDE SERPL-SCNC: 102 MMOL/L (ref 99–110)
CK SERPL-CCNC: 123 U/L (ref 39–308)
CLARITY UR: CLEAR
CO2 SERPL-SCNC: 24 MMOL/L (ref 21–32)
COLOR UR: YELLOW
CREAT SERPL-MCNC: 1.1 MG/DL (ref 0.9–1.3)
DEPRECATED RDW RBC AUTO: 13.7 % (ref 12.4–15.4)
EOSINOPHIL # BLD: 0.1 K/UL (ref 0–0.6)
EOSINOPHIL NFR BLD: 0.7 %
FLUAV RNA UPPER RESP QL NAA+PROBE: NEGATIVE
FLUBV AG NPH QL: NEGATIVE
GFR SERPLBLD CREATININE-BSD FMLA CKD-EPI: 87 ML/MIN/{1.73_M2}
GLUCOSE SERPL-MCNC: 147 MG/DL (ref 70–99)
GLUCOSE UR STRIP.AUTO-MCNC: NEGATIVE MG/DL
HCT VFR BLD AUTO: 42.7 % (ref 40.5–52.5)
HGB BLD-MCNC: 14.3 G/DL (ref 13.5–17.5)
HGB UR QL STRIP.AUTO: NEGATIVE
KETONES UR STRIP.AUTO-MCNC: NEGATIVE MG/DL
LEUKOCYTE ESTERASE UR QL STRIP.AUTO: NEGATIVE
LIPASE SERPL-CCNC: 101 U/L (ref 13–60)
LYMPHOCYTES # BLD: 2.5 K/UL (ref 1–5.1)
LYMPHOCYTES NFR BLD: 19.1 %
MCH RBC QN AUTO: 29.3 PG (ref 26–34)
MCHC RBC AUTO-ENTMCNC: 33.5 G/DL (ref 31–36)
MCV RBC AUTO: 87.3 FL (ref 80–100)
MONOCYTES # BLD: 1 K/UL (ref 0–1.3)
MONOCYTES NFR BLD: 7.5 %
NEUTROPHILS # BLD: 9.3 K/UL (ref 1.7–7.7)
NEUTROPHILS NFR BLD: 72.3 %
NITRITE UR QL STRIP.AUTO: NEGATIVE
PH UR STRIP.AUTO: 7.5 [PH] (ref 5–8)
PLATELET # BLD AUTO: 334 K/UL (ref 135–450)
PMV BLD AUTO: 7.3 FL (ref 5–10.5)
POTASSIUM SERPL-SCNC: 3.6 MMOL/L (ref 3.5–5.1)
PROT SERPL-MCNC: 6.2 G/DL (ref 6.4–8.2)
PROT UR STRIP.AUTO-MCNC: NEGATIVE MG/DL
RBC # BLD AUTO: 4.89 M/UL (ref 4.2–5.9)
SARS-COV-2 RDRP RESP QL NAA+PROBE: DETECTED
SODIUM SERPL-SCNC: 137 MMOL/L (ref 136–145)
SP GR UR STRIP.AUTO: 1.01 (ref 1–1.03)
TROPONIN, HIGH SENSITIVITY: <6 NG/L (ref 0–22)
UA COMPLETE W REFLEX CULTURE PNL UR: NORMAL
UA DIPSTICK W REFLEX MICRO PNL UR: NORMAL
URN SPEC COLLECT METH UR: NORMAL
UROBILINOGEN UR STRIP-ACNC: 0.2 E.U./DL
WBC # BLD AUTO: 12.9 K/UL (ref 4–11)

## 2024-06-23 PROCEDURE — 87804 INFLUENZA ASSAY W/OPTIC: CPT

## 2024-06-23 PROCEDURE — 80053 COMPREHEN METABOLIC PANEL: CPT

## 2024-06-23 PROCEDURE — 81003 URINALYSIS AUTO W/O SCOPE: CPT

## 2024-06-23 PROCEDURE — 84484 ASSAY OF TROPONIN QUANT: CPT

## 2024-06-23 PROCEDURE — 99285 EMERGENCY DEPT VISIT HI MDM: CPT

## 2024-06-23 PROCEDURE — 83690 ASSAY OF LIPASE: CPT

## 2024-06-23 PROCEDURE — 96360 HYDRATION IV INFUSION INIT: CPT

## 2024-06-23 PROCEDURE — 96361 HYDRATE IV INFUSION ADD-ON: CPT

## 2024-06-23 PROCEDURE — 85025 COMPLETE CBC W/AUTO DIFF WBC: CPT

## 2024-06-23 PROCEDURE — 87635 SARS-COV-2 COVID-19 AMP PRB: CPT

## 2024-06-23 PROCEDURE — 71045 X-RAY EXAM CHEST 1 VIEW: CPT

## 2024-06-23 PROCEDURE — 36415 COLL VENOUS BLD VENIPUNCTURE: CPT

## 2024-06-23 PROCEDURE — 93005 ELECTROCARDIOGRAM TRACING: CPT | Performed by: PHYSICIAN ASSISTANT

## 2024-06-23 PROCEDURE — 82550 ASSAY OF CK (CPK): CPT

## 2024-06-23 PROCEDURE — 2580000003 HC RX 258: Performed by: PHYSICIAN ASSISTANT

## 2024-06-23 RX ORDER — 0.9 % SODIUM CHLORIDE 0.9 %
1000 INTRAVENOUS SOLUTION INTRAVENOUS ONCE
Status: COMPLETED | OUTPATIENT
Start: 2024-06-23 | End: 2024-06-23

## 2024-06-23 RX ORDER — BENZONATATE 100 MG/1
100 CAPSULE ORAL EVERY 8 HOURS PRN
Qty: 10 CAPSULE | Refills: 0 | Status: SHIPPED | OUTPATIENT
Start: 2024-06-23 | End: 2024-07-03

## 2024-06-23 RX ORDER — ONDANSETRON 4 MG/1
4 TABLET, ORALLY DISINTEGRATING ORAL EVERY 8 HOURS PRN
Qty: 10 TABLET | Refills: 0 | Status: SHIPPED | OUTPATIENT
Start: 2024-06-23

## 2024-06-23 RX ADMIN — SODIUM CHLORIDE 1000 ML: 9 INJECTION, SOLUTION INTRAVENOUS at 14:11

## 2024-06-23 ASSESSMENT — LIFESTYLE VARIABLES
HOW MANY STANDARD DRINKS CONTAINING ALCOHOL DO YOU HAVE ON A TYPICAL DAY: PATIENT DOES NOT DRINK
HOW OFTEN DO YOU HAVE A DRINK CONTAINING ALCOHOL: NEVER

## 2024-06-23 NOTE — DISCHARGE INSTRUCTIONS
Rest and drink plenty of fluids. Tylenol and Ibuprofen as needed for pain or fever. Arrange follow up appointment with your doctor. Return to the ER for any worsening symptoms specifically for difficulty breathing or if vomiting and unable to keep down fluids.

## 2024-06-23 NOTE — ED PROVIDER NOTES
[facility] EMERGENCY DEPARTMENT  EMERGENCY DEPARTMENT ENCOUNTER        Pt Name: Luc Gar  MRN: [id number]  Birthdate 1985  Date of evaluation: 6/23/2024  Provider: Karina Gilmore PA-C  PCP: Елена Long APRN - MAYTE  Note Started: 1:56 PM EDT 6/23/24      GINA. I have evaluated this patient.        CHIEF COMPLAINT       Chief Complaint   Patient presents with    Heat Exposure       HISTORY OF PRESENT ILLNESS: 1 or more Elements     History From: Patient  Limitations to history : None    Luc Gar is a 39 y.o. male who presents to the emergency department for evaluation of general fatigue and weakness states he has been sick for couple weeks with cough and cold symptoms saw his doctor left Friday before last he was placed on antibiotics steroids and inhaler states that he worked in the heat all week last week and he was trying to keep up drinking fluids drinking water and Gatorade and electrolytes but towards the end of the week he felt even worse very fatigued and generally weak and also had nausea and vomiting at times has had chest pain with his cough and has been using his inhaler.  States when driving feels like he is having trouble concentrating his eyes and like he is in a fog.  No vision change or loss of vision.  Having dizziness, lightheadedness the past week.  No syncopal episodes.  Has chronic neck pain with degenerative disc disease and numbness and weakness into his arms is unchanged.  No new focal neurodeficits.  Alert and oriented GCS 15.  States he had 1 alcoholic drink last night.    Nursing Notes were all reviewed and agreed with or any disagreements were addressed in the HPI.    REVIEW OF SYSTEMS :      Review of Systems    Positives and Pertinent negatives as per HPI.     SURGICAL HISTORY     Past Surgical History:   Procedure Laterality Date    CARDIAC CATHETERIZATION      KNEE ARTHROSCOPY Left 8/16/2021    LEFT KNEE VIDEO ARTHROSCOPY, CHONDROPLASTY OF 
The Ekg interpreted by me shows  sinus bradycardia, rate=54    Axis is   Normal  QTc is  normal  Intervals and Durations are unremarkable.      ST Segments: normal  No prior ekgs          Ponce Szymanski MD  06/23/24 0342    
Private Vehicle

## 2024-06-23 NOTE — ED TRIAGE NOTES
Concerned for heat exposure while working this week c/o fatigue, dizziness, vision changes. Reports he recently finished abx for cough/cold with productive cough continuing.

## 2024-06-24 LAB
EKG ATRIAL RATE: 54 BPM
EKG DIAGNOSIS: NORMAL
EKG P AXIS: 12 DEGREES
EKG P-R INTERVAL: 156 MS
EKG Q-T INTERVAL: 428 MS
EKG QRS DURATION: 82 MS
EKG QTC CALCULATION (BAZETT): 405 MS
EKG R AXIS: 4 DEGREES
EKG T AXIS: 18 DEGREES
EKG VENTRICULAR RATE: 54 BPM

## 2024-06-24 PROCEDURE — 93010 ELECTROCARDIOGRAM REPORT: CPT | Performed by: INTERNAL MEDICINE

## 2024-08-22 ENCOUNTER — OFFICE VISIT (OUTPATIENT)
Dept: ORTHOPEDIC SURGERY | Age: 39
End: 2024-08-22
Payer: COMMERCIAL

## 2024-08-22 VITALS — BODY MASS INDEX: 35 KG/M2 | HEIGHT: 71 IN | WEIGHT: 250 LBS

## 2024-08-22 DIAGNOSIS — M54.2 PAIN OF CERVICAL SPINE: Primary | ICD-10-CM

## 2024-08-22 DIAGNOSIS — M47.22 CERVICAL SPONDYLOSIS WITH RADICULOPATHY: ICD-10-CM

## 2024-08-22 PROCEDURE — G8427 DOCREV CUR MEDS BY ELIG CLIN: HCPCS | Performed by: ORTHOPAEDIC SURGERY

## 2024-08-22 PROCEDURE — G8417 CALC BMI ABV UP PARAM F/U: HCPCS | Performed by: ORTHOPAEDIC SURGERY

## 2024-08-22 PROCEDURE — 99214 OFFICE O/P EST MOD 30 MIN: CPT | Performed by: ORTHOPAEDIC SURGERY

## 2024-08-22 PROCEDURE — 4004F PT TOBACCO SCREEN RCVD TLK: CPT | Performed by: ORTHOPAEDIC SURGERY

## 2024-08-22 SDOH — HEALTH STABILITY: PHYSICAL HEALTH
ON AVERAGE, HOW MANY DAYS PER WEEK DO YOU ENGAGE IN MODERATE TO STRENUOUS EXERCISE (LIKE A BRISK WALK)?: PATIENT DECLINED

## 2024-08-22 NOTE — PROGRESS NOTES
New Patient: CERVICAL SPINE    Referring Provider:  tSan Renee MD    CHIEF COMPLAINT:    Chief Complaint   Patient presents with    New Patient     Np cervical spine   Ref Víctor  No recent imaging  Mri 2023       HISTORY OF PRESENT ILLNESS:   Mr. Luc Gar is a pleasant 39 y.o. old male presents with a 1-1/2-year history of neck and left arm pain.  He notes tingling in both hands and weakness of his arms and legs.. He denies loss of fine motor control, lower extremity symptoms, gait abnormality and bowel or bladder dysfunction.  He sees Dr. Renee    Current/Past Treatment:   Physical Therapy: No  Chiropractic: Yes  Injection: Yes  Medications: Norco, gabapentin, Robaxin and Mobic    Past Medical History:   Past Medical History:   Diagnosis Date    Anxiety     GERD (gastroesophageal reflux disease)     Hx of blood clots     post left knee surgery      Past Surgical History:     Past Surgical History:   Procedure Laterality Date    CARDIAC CATHETERIZATION      KNEE ARTHROSCOPY Left 8/16/2021    LEFT KNEE VIDEO ARTHROSCOPY, CHONDROPLASTY OF PATELLA, SYNOVECTOMY performed by Fan Molina MD at [facility]    KNEE ARTHROSCOPY Right 12/27/2021    RIGHT KNEE VIDEO ARTHROSCOPY CHONDROPLASTY OF PATELLA performed by Fan Molina MD at [facility] OR    KNEE SURGERY      LT    OTHER SURGICAL HISTORY  08/16/2021    LEFT KNEE VIDEO ARTHROSCOPY, CHONDROPLASTY OF PATELLA, SYNOVECTOMY     OTHER SURGICAL HISTORY      LEFT KNEE VIDEO ARTHROSCOPY, CHONDROPLASTY OF PATELLA, SYNOVECTOMY    OTHER SURGICAL HISTORY  12/27/2021    RIGHT KNEE VIDEO ARTHROSCOPY CHONDROPLASTY OF PATELLA (Right Knee    SHOULDER SURGERY       Current Medications:     Current Outpatient Medications:     HYDROcodone-acetaminophen (NORCO) 5-325 MG per tablet, Take 1 tablet by mouth 3 times daily as needed for Pain for up to 30 days. Max Daily Amount: 3 tablets, Disp: 90 tablet, Rfl: 0    gabapentin (NEURONTIN) 600 MG tablet, Take 1 tablet by mouth

## 2024-08-28 ENCOUNTER — HOSPITAL ENCOUNTER (OUTPATIENT)
Dept: PHYSICAL THERAPY | Age: 39
Setting detail: THERAPIES SERIES
Discharge: HOME | End: 2024-08-28
Payer: COMMERCIAL

## 2024-08-28 DIAGNOSIS — M54.2 NECK PAIN: Primary | ICD-10-CM

## 2024-08-28 PROCEDURE — 97140 MANUAL THERAPY 1/> REGIONS: CPT

## 2024-08-28 PROCEDURE — 97161 PT EVAL LOW COMPLEX 20 MIN: CPT

## 2024-08-28 PROCEDURE — 97112 NEUROMUSCULAR REEDUCATION: CPT

## 2024-08-28 PROCEDURE — 97110 THERAPEUTIC EXERCISES: CPT

## 2024-08-28 NOTE — PLAN OF CARE
[facility]- Outpatient Rehabilitation and Therapy [address] [place] [address] office: [phone] fax: [phone]     Physical Therapy Initial Evaluation Certification      Dear Ariel Marinelli MD,    We had the pleasure of evaluating the following patient for physical therapy services at [facility] Out[facility].  A summary of our findings can be found in the initial assessment below.  This includes our plan of care.  If you have any questions or concerns regarding these findings, please do not hesitate to contact me at the office phone number listed above.  Thank you for the referral.     Physician Signature:_______________________________Date:__________________  By signing above (or electronic signature), therapist’s plan is approved by physician       Physical Therapy: TREATMENT/PROGRESS NOTE   Patient: Luc Gar (39 y.o. male)   Examination Date: 2024   :  1985 MRN: [id number]   Visit #: 1   Insurance Allowable Auth Needed   [place] []Yes    [x]No    Insurance: Payor: BCBS / Plan: BCBS - [place] PPO / Product Type: *No Product type* /   Insurance ID: [id number] - ([facility])  Secondary Insurance (if applicable): [email or web address] O*   Treatment Diagnosis:     ICD-10-CM    1. Neck pain  M54.2          Medical Diagnosis:  Cervical spondylosis with radiculopathy [M47.22]   Referring Physician: Ariel Marinelli MD  PCP: Елена Long, APRN - CNP     Plan of care signed (Y/N):     Date of Patient follow up with Physician:      Plan of Care Report: EVAL today  POC update due: (10 visits /OR AUTH LIMITS, whichever is less)  2024                                             Medical History:  Comorbidities:  None  Relevant Medical History: h/o L shoulder surgery , bilateral knee surgeries                                          Precautions/ Contra-indications:           Latex allergy:  NO  Pacemaker:    NO  Contraindications for  requires continued skilled intervention: [x] Yes  [] No      CHARGE CAPTURE     PT CHARGE GRID   CPT Code (TIMED) minutes # CPT Code (UNTIMED) #     Therex (36012)  15 1  EVAL:LOW (18226 - Typically 20 minutes face-to-face) 1    Neuromusc. Re-ed (14022) 10 1  Re-Eval (84856)     Manual (33909) 15 1  Estim Unattended (67063)     Ther. Act (37999)    Mech. Traction (71711)     Gait (65754)    Dry Needle 1-2 muscle (20560)     Aquatic Therex (89416)    Dry Needle 3+ muscle (20561)     Iontophoresis (56920)    VASO (39307)     Ultrasound (18901)    Group Therapy (80230)     Estim Attended (36859)    Canalith Repositioning (55446)     Physical Performance Test (57023)         Other:    Other:    Total Timed Code Tx Minutes 40 3  1     Total Treatment Minutes 60        Charge Justification:  (83402) THERAPEUTIC EXERCISE - Provided verbal/tactile cueing for activities related to strengthening, flexibility, endurance, ROM performed to prevent loss of range of motion, maintain or improve muscular strength or increase flexibility, following either an injury or surgery.   (11391) HOME EXERCISE PROGRAM - Reviewed/Progressed HEP activities related to strengthening, flexibility, endurance, ROM performed to prevent loss of range of motion, maintain or improve muscular strength or increase flexibility, following either an injury or surgery.  (43904) NEUROMUSCULAR RE-EDUCATION - Therapeutic procedure, 1 or more areas, each 15 minutes; neuromuscular reeducation of movement, balance, coordination, kinesthetic sense, posture, and/or proprioception for sitting and/or standing activities  (17873) HOME EXERCISE PROGRAM - Reviewed/Progressed HEP activities related to neuromuscular reeducation of movement, balance, coordination, kinesthetic sense, posture, and/or proprioception for sitting and/or standing activities    (28729) MANUAL THERAPY -  Manual therapy techniques, 1 or more regions, each 15 minutes (Mobilization/manipulation, manual

## 2024-09-09 ENCOUNTER — HOSPITAL ENCOUNTER (OUTPATIENT)
Dept: PHYSICAL THERAPY | Age: 39
Setting detail: THERAPIES SERIES
Discharge: HOME | End: 2024-09-09
Payer: COMMERCIAL

## 2024-09-09 PROCEDURE — 97110 THERAPEUTIC EXERCISES: CPT | Performed by: PHYSICAL THERAPY ASSISTANT

## 2024-09-09 PROCEDURE — 97140 MANUAL THERAPY 1/> REGIONS: CPT | Performed by: PHYSICAL THERAPY ASSISTANT

## 2024-09-09 PROCEDURE — 97112 NEUROMUSCULAR REEDUCATION: CPT | Performed by: PHYSICAL THERAPY ASSISTANT

## 2024-09-11 ENCOUNTER — HOSPITAL ENCOUNTER (OUTPATIENT)
Dept: PHYSICAL THERAPY | Age: 39
Setting detail: THERAPIES SERIES
Discharge: HOME | End: 2024-09-11
Payer: COMMERCIAL

## 2024-09-11 PROCEDURE — 97110 THERAPEUTIC EXERCISES: CPT | Performed by: PHYSICAL THERAPY ASSISTANT

## 2024-09-11 PROCEDURE — 97112 NEUROMUSCULAR REEDUCATION: CPT | Performed by: PHYSICAL THERAPY ASSISTANT

## 2024-09-11 PROCEDURE — 97140 MANUAL THERAPY 1/> REGIONS: CPT | Performed by: PHYSICAL THERAPY ASSISTANT

## 2024-09-16 ENCOUNTER — HOSPITAL ENCOUNTER (OUTPATIENT)
Dept: PHYSICAL THERAPY | Age: 39
Setting detail: THERAPIES SERIES
Discharge: HOME | End: 2024-09-16
Payer: COMMERCIAL

## 2024-09-16 PROCEDURE — 97112 NEUROMUSCULAR REEDUCATION: CPT | Performed by: PHYSICAL THERAPY ASSISTANT

## 2024-09-16 PROCEDURE — 97140 MANUAL THERAPY 1/> REGIONS: CPT | Performed by: PHYSICAL THERAPY ASSISTANT

## 2024-09-16 PROCEDURE — 97110 THERAPEUTIC EXERCISES: CPT | Performed by: PHYSICAL THERAPY ASSISTANT

## 2024-09-18 ENCOUNTER — APPOINTMENT (OUTPATIENT)
Dept: PHYSICAL THERAPY | Age: 39
End: 2024-09-18
Payer: COMMERCIAL

## 2024-09-20 ENCOUNTER — HOSPITAL ENCOUNTER (OUTPATIENT)
Dept: PHYSICAL THERAPY | Age: 39
Setting detail: THERAPIES SERIES
Discharge: HOME | End: 2024-09-20
Payer: COMMERCIAL

## 2024-09-20 PROCEDURE — 97140 MANUAL THERAPY 1/> REGIONS: CPT | Performed by: PHYSICAL THERAPY ASSISTANT

## 2024-09-20 PROCEDURE — 97112 NEUROMUSCULAR REEDUCATION: CPT | Performed by: PHYSICAL THERAPY ASSISTANT

## 2024-09-20 PROCEDURE — 97110 THERAPEUTIC EXERCISES: CPT | Performed by: PHYSICAL THERAPY ASSISTANT

## 2024-09-23 ENCOUNTER — TELEPHONE (OUTPATIENT)
Dept: ORTHOPEDIC SURGERY | Age: 39
End: 2024-09-23

## 2024-09-23 ENCOUNTER — HOSPITAL ENCOUNTER (OUTPATIENT)
Dept: PHYSICAL THERAPY | Age: 39
Setting detail: THERAPIES SERIES
Discharge: HOME | End: 2024-09-23
Payer: COMMERCIAL

## 2024-09-23 PROCEDURE — 97112 NEUROMUSCULAR REEDUCATION: CPT | Performed by: PHYSICAL THERAPY ASSISTANT

## 2024-09-23 PROCEDURE — 97140 MANUAL THERAPY 1/> REGIONS: CPT | Performed by: PHYSICAL THERAPY ASSISTANT

## 2024-09-23 PROCEDURE — 97110 THERAPEUTIC EXERCISES: CPT | Performed by: PHYSICAL THERAPY ASSISTANT

## 2024-09-25 ENCOUNTER — HOSPITAL ENCOUNTER (OUTPATIENT)
Dept: PHYSICAL THERAPY | Age: 39
Setting detail: THERAPIES SERIES
Discharge: HOME | End: 2024-09-25
Payer: COMMERCIAL

## 2024-09-25 PROCEDURE — 97110 THERAPEUTIC EXERCISES: CPT | Performed by: PHYSICAL THERAPY ASSISTANT

## 2024-09-25 PROCEDURE — 97140 MANUAL THERAPY 1/> REGIONS: CPT | Performed by: PHYSICAL THERAPY ASSISTANT

## 2024-09-25 PROCEDURE — 97112 NEUROMUSCULAR REEDUCATION: CPT | Performed by: PHYSICAL THERAPY ASSISTANT

## 2024-10-03 ENCOUNTER — OFFICE VISIT (OUTPATIENT)
Dept: ORTHOPEDIC SURGERY | Age: 39
End: 2024-10-03
Payer: COMMERCIAL

## 2024-10-03 ENCOUNTER — TELEPHONE (OUTPATIENT)
Dept: ORTHOPEDIC SURGERY | Age: 39
End: 2024-10-03

## 2024-10-03 VITALS — WEIGHT: 250 LBS | BODY MASS INDEX: 35 KG/M2 | HEIGHT: 71 IN

## 2024-10-03 DIAGNOSIS — M47.22 CERVICAL SPONDYLOSIS WITH RADICULOPATHY: Primary | ICD-10-CM

## 2024-10-03 PROCEDURE — 4004F PT TOBACCO SCREEN RCVD TLK: CPT | Performed by: ORTHOPAEDIC SURGERY

## 2024-10-03 PROCEDURE — 99214 OFFICE O/P EST MOD 30 MIN: CPT | Performed by: ORTHOPAEDIC SURGERY

## 2024-10-03 PROCEDURE — G8417 CALC BMI ABV UP PARAM F/U: HCPCS | Performed by: ORTHOPAEDIC SURGERY

## 2024-10-03 PROCEDURE — G8484 FLU IMMUNIZE NO ADMIN: HCPCS | Performed by: ORTHOPAEDIC SURGERY

## 2024-10-03 PROCEDURE — G8427 DOCREV CUR MEDS BY ELIG CLIN: HCPCS | Performed by: ORTHOPAEDIC SURGERY

## 2024-10-03 NOTE — PROGRESS NOTES
New Patient: CERVICAL SPINE    Referring Provider:  No ref. provider found    CHIEF COMPLAINT:    Chief Complaint   Patient presents with    Follow-up     Ck Cervical       HISTORY OF PRESENT ILLNESS:   Mr. Luc Gar is a pleasant 39 y.o. old male presents with a 1-1/2-year history of neck and left arm pain.  He notes tingling in both hands and weakness of his arms and legs.. He denies loss of fine motor control, lower extremity symptoms, gait abnormality and bowel or bladder dysfunction.  He sees Dr. Renee    Current/Past Treatment:   Physical Therapy: No  Chiropractic: Yes  Injection: Yes  Medications: Norco, gabapentin, Robaxin and Mobic    Past Medical History:   Past Medical History:   Diagnosis Date    Anxiety     GERD (gastroesophageal reflux disease)     Hx of blood clots     post left knee surgery      Past Surgical History:     Past Surgical History:   Procedure Laterality Date    CARDIAC CATHETERIZATION      KNEE ARTHROSCOPY Left 8/16/2021    LEFT KNEE VIDEO ARTHROSCOPY, CHONDROPLASTY OF PATELLA, SYNOVECTOMY performed by Fan Molina MD at [facility] OR    KNEE ARTHROSCOPY Right 12/27/2021    RIGHT KNEE VIDEO ARTHROSCOPY CHONDROPLASTY OF PATELLA performed by Fan Molina MD at [facility] OR    KNEE SURGERY      LT    OTHER SURGICAL HISTORY  08/16/2021    LEFT KNEE VIDEO ARTHROSCOPY, CHONDROPLASTY OF PATELLA, SYNOVECTOMY     OTHER SURGICAL HISTORY      LEFT KNEE VIDEO ARTHROSCOPY, CHONDROPLASTY OF PATELLA, SYNOVECTOMY    OTHER SURGICAL HISTORY  12/27/2021    RIGHT KNEE VIDEO ARTHROSCOPY CHONDROPLASTY OF PATELLA (Right Knee    SHOULDER SURGERY       Current Medications:     Current Outpatient Medications:     HYDROcodone-acetaminophen (NORCO) 5-325 MG per tablet, Take 1 tablet by mouth 3 times daily as needed for Pain for up to 30 days. Max Daily Amount: 3 tablets, Disp: 90 tablet, Rfl: 0    [START ON 10/22/2024] HYDROcodone-acetaminophen (NORCO) 5-325 MG per tablet, Take 1 tablet by mouth 3 times

## 2024-10-10 ENCOUNTER — PREP FOR PROCEDURE (OUTPATIENT)
Dept: ORTHOPEDIC SURGERY | Age: 39
End: 2024-10-10

## 2024-10-10 DIAGNOSIS — M47.22 CERVICAL SPONDYLOSIS WITH RADICULOPATHY: Primary | ICD-10-CM

## 2024-10-11 RX ORDER — SODIUM CHLORIDE 0.9 % (FLUSH) 0.9 %
5-40 SYRINGE (ML) INJECTION PRN
Status: CANCELLED | OUTPATIENT
Start: 2024-10-11

## 2024-10-11 RX ORDER — SODIUM CHLORIDE 9 MG/ML
INJECTION, SOLUTION INTRAVENOUS PRN
Status: CANCELLED | OUTPATIENT
Start: 2024-10-11

## 2024-10-11 RX ORDER — SODIUM CHLORIDE 0.9 % (FLUSH) 0.9 %
5-40 SYRINGE (ML) INJECTION EVERY 12 HOURS SCHEDULED
Status: CANCELLED | OUTPATIENT
Start: 2024-10-11

## 2024-11-13 NOTE — FLOWSHEET NOTE
[address] [place] [address] and [address], [address] [place] [address] [address]  Phone: [phone]   Fax:     [phone]      Physical Therapy Treatment Note/ Progress Report:           Date:  2022    Patient Name:  Ronnie Person    :  1985  MRN: [id number]  Restrictions/Precautions:    Medical/Treatment Diagnosis Information:  · Diagnosis: M22.41, M22.42 (ICD-10-CM) - Chondromalacia of both patellae; S83.282D (ICD-10-CM) - Acute lateral meniscus tear of left knee, subsequent encounter  · Treatment Diagnosis: Bilateral knee pain, decreased ROM, flexibility, strength  Insurance/Certification information:  PT Insurance Information: [facility]  Physician Information:    Has the plan of care been signed (Y/N):        []  Yes  []  No     Date of Patient follow up with Physician:     Assessment Summary: Sravanthi Chambers is a 39 y.o. male reporting to OP PT with c/c of bilateral knee pain and knee scopes bilaterally, left on 21 and right on 21. Pt is noted to have reduced ROM, flexibility and strength. Overall reduced functional mobility. Will progress as tolerated. Is this a Progress Report:     []  Yes  [x]  No        If Yes:  Date Range for reporting period:  Beginning   22  Ending 22    Progress report will be due (10 Rx or 30 days whichever is less): 89       Recertification will be due (POC Duration  / 90 days whichever is less): 3/4/22          Visit # Insurance Allowable Auth Required   In Person  30 []  Yes     []  No    Tele Health   []  Yes     []  No    Total 28             Functional Scale: LEFS 60%, 49% , 30%   Date assessed:        Latex Allergy:  [x]NO      []YES  Preferred Language for Healthcare:   [x]English       []other:      Pain level:  4/10     SUBJECTIVE:  Pt states knees mre sore today from over working this weekend.          OBJECTIVE:         RESTRICTIONS/PRECAUTIONS: None     Exercises/Interventions: HEP code: [id number]  Therapeutic Ex (17963) HEP 4/4//22 5/3/22 5/9/22   Warm-up       Rec bike  7', Lv 6 7', Lv 6           TABLE       Heel slides x      Strap gastroc stretch x      Hs stretch x      Quad set       SLR x      Bridge x      Bridge w/ march       Melvin stretch x      Quad stretch              SEATED                                                 STANDING       Wedge gastroc stretch  1' 1' 1'   Heel raises 1/2 roll  10x3 10x3 10x3   Toe raises 1/2 roll  10x3 10x3 10x3   HS stretch  1' 1' B 1' B   Anterior step ups  -- --    Anterior step downs  -- --    Lateral step ups  -- -- 10x2 B, 6\"   Resisted side steps  -- 10'x2 laps, GTB    Monster walks  -- --    Knee extension machine  X30, 40# X20, 45#    HS curl machine  X30, 50# X30, 55#    COREY Abduction  x20 B, 75# x20 B, 80# x20 B, 80#   COREY Extension  x20 B, 74# x20 B, 80# x20 B, 80#   COREY Flexion  x20 B, 75# x20 B, 80# x20 B, 80#   Leg Press DL  X15, 100# X20, 110# X20, 110#   Leg Press Eccentric  X15, 70# x15 B, 80# x15 B, 80#   Retrowalk CC  X15, 45# X15, 55# X15, 55#   BOSU balance  1' 1' 1'   BOSU squats  10x2 10x2 10x2   BOSU marches  -- --    BOSU step ups  x15 B x15 B x15 B   Stool push SL  20'x3 20'x4 20'x4   Stool pull DL  20\"x4 20'x4 20'x4   Ladder  x15 x15    Steamboats  x20 ea, BTB x20 ea BlkTB x20 ea B BLkTB                           Manual (99654): Therapeutic Exercise and NMR EXR  [x] (54918) Provided verbal/tactile cueing for activities related to strengthening, flexibility, endurance, ROM for improvements in LE, proximal hip, and core control with self care, mobility, lifting, ambulation. [x] (04737) Provided verbal/tactile cueing for activities related to improving balance, coordination, kinesthetic sense, posture, motor skill, proprioception to assist with LE, proximal hip, and core control in self-care, mobility, lifting, ambulation and eccentric single leg control.      NMR and Therapeutic Activities:    [x] (84409 or 56540) Provided verbal/tactile cueing for activities related to improving balance, coordination, kinesthetic sense, posture, motor skill, proprioception and motor activation to allow for proper function of core, proximal hip and LE with self-care and ADLs and functional mobility.   [] (45278) Gait Re-education- Provided training and instruction to the patient for proper LE, core and proximal hip recruitment and positioning and eccentric body weight control with ambulation re-education including up and down stairs     Home Exercise Program:    [x] (42626) Reviewed/Progressed HEP activities related to strengthening, flexibility, endurance, ROM of core, proximal hip and LE for functional self-care, mobility, lifting and ambulation/stair navigation   [] (00344) Reviewed/Progressed HEP activities related to improving balance, coordination, kinesthetic sense, posture, motor skill, proprioception of core, proximal hip and LE for self-care, mobility, lifting, and ambulation/stair navigation      Manual Treatments:  PROM / STM / Oscillations-Mobs:  G-I, II, III, IV (PA's, Inf., Post.)  [x] (86313) Provided manual therapy to mobilize LE, proximal hip and/or LS spine soft tissue/joints for the purpose of modulating pain, promoting relaxation, increasing ROM, reducing/eliminating soft tissue swelling/inflammation/restriction, improving soft tissue extensibility and allowing for proper ROM for normal function with self-care, mobility, lifting and ambulation. Modalities:     [x] GAME READY (VASO)- for significant edema, swelling, pain control.   Right LE, no adverse skin reaction      Charges:  Timed Code Treatment Minutes: 45   Total Treatment Minutes:  55   BWC:  TE TIME:  NMR TIME:  MANUAL TIME:   TA  UNTIMED MINUTES:  Medicare Total:   35  10      10        [] EVAL (LOW) 99087 (typically 20 minutes face-to-face)  [] EVAL (MOD) 52853 (typically 30 minutes face-to-face)  [] EVAL (HIGH) 54844 (typically 45 minutes face-to-face)  [] RE-EVAL [x] SL(61667) 2     [] IONTO  [x] NMR (70589) 1     [] VASO  [] Manual (10051) x     [] Dry Needle:  [x] TA 1      [] Mech Traction (85953)  [] ES(attended) (87049)      [] ES (un) (75503):        GOALS:     Patient stated goal: Improve mobility, return to work      Therapist goals for Patient:   Short Term Goals: To be achieved in: 2 weeks  1. Independent in HEP and progression per patient tolerance, in order to prevent re-injury. []? Progressing: [x]? Met: []? Not Met: []? Adjusted      2. Patient will have a decrease in pain of NRPS to </=3/10 facilitate improvement in movement, function, and ADLs as indicated by Functional Deficits. [x]? Progressing: [x]? Met: []? Not Met: []? Adjusted      Long Term Goals: To be achieved in: 8 weeks  1. Disability index score of 30% or less for the LEFS to assist with reaching prior level of function. []? Progressing: [x]? Met: []? Not Met: []? Adjusted      2. Patient will demonstrate increased AROM to 0-130 to bilateral knees to allow for proper joint functioning as indicated by patients Functional Deficits. []? Progressing: [x]? Met: []? Not Met: []? Adjusted      3. Patient will demonstrate an increase in Strength to 5/5 to knee flex/ext allow for proper functional mobility as indicated by patients Functional Deficits. []? Progressing: [x]? Met: []? Not Met: []? Adjusted      4. Patient will return to functional walking activities with NRPS of </= 1/10. [x]? Progressing: []? Met: []? Not Met: []? Adjusted      5. Pt will be able to ascend/descend ladder with deviation to allow for return to work activities.  (patient specific functional goal)    []? Progressing: [x]? Met: []? Not Met: []? Adjusted                ASSESSMENT:  Crissie Ice session well. Addition of vaso since more pain today. Decreased overall volume today. Patient received education on their current pathology and how their condition effects them with their functional activities.  Patient understood discussion and questions were answered. Patient understands their activity limitations and understands rational for treatment progression. Pt educated on plan of care and HEP, if worsening symptoms to d/c that exercise. PLAN: See eval  [x] Continue per plan of care [] Alter current plan (see comments above)  [] Plan of care initiated [] Hold pending MD visit [] Discharge      Electronically signed by:  Barbie Tucker, PT    Note: If patient does not return for scheduled/ recommended follow up visits, this note will serve as a discharge from care along with most recent update on progress. unable to assess

## 2025-02-18 ENCOUNTER — HOSPITAL ENCOUNTER (OUTPATIENT)
Dept: PHYSICAL THERAPY | Age: 40
Setting detail: THERAPIES SERIES
Discharge: HOME | End: 2025-02-18
Payer: COMMERCIAL

## 2025-02-18 DIAGNOSIS — M54.2 NECK PAIN: ICD-10-CM

## 2025-02-18 DIAGNOSIS — M43.6 STIFFNESS OF CERVICAL SPINE: Primary | ICD-10-CM

## 2025-02-18 PROCEDURE — 97161 PT EVAL LOW COMPLEX 20 MIN: CPT

## 2025-02-18 PROCEDURE — 97110 THERAPEUTIC EXERCISES: CPT

## 2025-02-18 PROCEDURE — 97140 MANUAL THERAPY 1/> REGIONS: CPT

## 2025-02-18 NOTE — PLAN OF CARE
[facility] - Outpatient Rehabilitation and Therapy: [address]. [place] [address] office: [phone] fax: [phone]     Physical Therapy Initial Evaluation Certification      Dear Lalo Sanabria MD ,    We had the pleasure of evaluating the following patient for physical therapy services at [facility].  A summary of our findings can be found in the initial assessment below.  This includes our plan of care.  If you have any questions or concerns regarding these findings, please do not hesitate to contact me at the office phone number listed above.  Thank you for the referral.     Physician Signature:_______________________________Date:__________________  By signing above (or electronic signature), therapist’s plan is approved by physician       Physical Therapy: TREATMENT/PROGRESS NOTE   Patient: Luc Gar (39 y.o. male)   Examination Date: 2025   :  1985 MRN: [id number]   Visit #: 1   Insurance Allowable Auth Needed   Med Nec []Yes    [x]No    [place] / Product Type: *No Product type* /   Insurance [facility]: [id number] [facility]  Secondary Insurance (if applicable): [employer] O*   Treatment Diagnosis:     ICD-10-CM    1. Stiffness of cervical spine  M43.6       2. Neck pain  M54.2          Medical Diagnosis: Cervical DDD s/p Anterior Cervical Discectomy with  Mobi-C Arthroplasty of C5-C6; C6-C7 24      Referring Physician: Lalo Sanabria MD  PCP: Елена Long, APRN - CNP     Plan of care signed (Y/N):     Date of Patient follow up with Physician:      Plan of Care Report: EVAL today  POC update due: (10 visits /OR AUTH LIMITS, whichever is less)  3/20/2025                                             Medical History:  Comorbidities:  Other: see below  Relevant Medical History:   Past Medical History:   Diagnosis Date    Anxiety     GERD (gastroesophageal reflux disease)

## 2025-02-25 ENCOUNTER — HOSPITAL ENCOUNTER (OUTPATIENT)
Dept: PHYSICAL THERAPY | Age: 40
Setting detail: THERAPIES SERIES
Discharge: HOME | End: 2025-02-25
Payer: COMMERCIAL

## 2025-02-25 PROCEDURE — 97140 MANUAL THERAPY 1/> REGIONS: CPT

## 2025-02-25 PROCEDURE — 97112 NEUROMUSCULAR REEDUCATION: CPT

## 2025-02-25 PROCEDURE — 97110 THERAPEUTIC EXERCISES: CPT

## 2025-02-25 NOTE — FLOWSHEET NOTE
[email or web address]  Access Code: [email or web address]  URL: [email or web address]  Date: 02/18/2025  Prepared by: Hanh Guerin    Exercises  - Supine Scapular Retraction  - 1 x daily - 7 x weekly - 3 sets - 10 reps  - Supine Chin Tuck  - 1 x daily - 7 x weekly - 1 sets - 10 reps - 5\" hold  - Seated Scapular Retraction  - 1 x daily - 7 x weekly - 2 sets - 10 reps  - Seated Shoulder Shrug Circles AROM Backward  - 1 x daily - 7 x weekly - 2 sets - 10 reps  - Seated Cervical Rotation AROM  - 1 x daily - 7 x weekly - 1 sets - 10 reps  - Seated Cervical Flexion AROM  - 1 x daily - 7 x weekly - 1 sets - 10 reps  - Seated Cervical Extension AROM  - 1 x daily - 7 x weekly - 1 sets - 10 reps  - Seated Neck Sidebending Stretch  - 1 x daily - 7 x weekly - 1 sets - 3 reps - 30\" hold  - Gentle Levator Scapulae Stretch  - 1 x daily - 7 x weekly - 1 sets - 3 reps - 30\" hold    ASSESSMENT     Today's Assessment: During therapy this date, patient required visual cueing and verbal cueing for improving proper muscle recruitment and activation/motor control patterns, modulating pain, promoting relaxation, reduce/eliminate soft tissue swelling/inflammation/restriction, improving soft tissue extensibility, and allowing for proper ROM.Patient will continue to benefit from ongoing evaluation and advanced clinical decision from a Physical Therapist to address and improve pain control, ROM, muscle strength, neuromuscular control, endurance, functional mobility, ADL status, safety awareness, and fine motor control to safely return to PLOF without symptoms or restrictions.    Medical Necessity Documentation:  I certify that this patient meets the below criteria necessary for medical necessity for care and/or justification of therapy services:  The patient has a musculoskeletal condition(s) with a corresponding ICD-10 code that is of complexity and severity that require skilled therapeutic intervention. This has a direct and significant impact on the need  medication pump(s) used

## 2025-02-27 ENCOUNTER — HOSPITAL ENCOUNTER (OUTPATIENT)
Dept: PHYSICAL THERAPY | Age: 40
Setting detail: THERAPIES SERIES
Discharge: HOME | End: 2025-02-27
Payer: COMMERCIAL

## 2025-02-27 PROCEDURE — 97112 NEUROMUSCULAR REEDUCATION: CPT | Performed by: PHYSICAL THERAPY ASSISTANT

## 2025-02-27 PROCEDURE — 97110 THERAPEUTIC EXERCISES: CPT | Performed by: PHYSICAL THERAPY ASSISTANT

## 2025-02-27 PROCEDURE — 97140 MANUAL THERAPY 1/> REGIONS: CPT | Performed by: PHYSICAL THERAPY ASSISTANT

## 2025-02-27 NOTE — FLOWSHEET NOTE
[facility] - Outpatient Rehabilitation and Therapy: [address], [place] [address] office: [phone] fax: [phone]      Physical Therapy: TREATMENT/PROGRESS NOTE   Patient: Luc Gar (39 y.o. male)   Examination Date: 2025   :  1985 MRN: [id number]   Visit #: 3   Insurance Allowable Auth Needed   Med Nec []Yes    [x]No    [place] / Product Type: *No Product type* /   Insurance ID: [id number] - ([place] BCBS)  Secondary Insurance (if applicable): [employer] O*   Treatment Diagnosis:     ICD-10-CM    1. Stiffness of cervical spine  M43.6       2. Neck pain  M54.2          Medical Diagnosis: Cervical DDD s/p Anterior Cervical Discectomy with  Mobi-C Arthroplasty of C5-C6; C6-C7 24      Referring Physician: Lalo Sanabria MD  PCP: Елена Long APRN - CNP     Plan of care signed (Y/N):     Date of Patient follow up with Physician:      Plan of Care Report: NO  POC update due: (10 visits /OR AUTH LIMITS, whichever is less)  3/20/2025                                             Medical History:  Comorbidities:  Other: see below  Relevant Medical History:   Past Medical History:   Diagnosis Date    Anxiety     GERD (gastroesophageal reflux disease)     Hx of blood clots     post left knee surgery                                              Precautions/ Contra-indications:           Latex allergy:  NO  Pacemaker:    NO  Contraindications for Manipulation: recent surgical history (relative)  Date of Surgery: 2024  Other:    Red Flags:  None    Suicide Screening:   The patient did not verbalize a primary behavioral concern, suicidal ideation, suicidal intent, or demonstrate suicidal behaviors.    Preferred Language for Healthcare:   [x] English       [] other:    SUBJECTIVE EXAMINATION     Patient stated complaint: Feeling a little more pain up high in the neck and into the upper traps slightly.  He states

## 2025-03-04 ENCOUNTER — HOSPITAL ENCOUNTER (OUTPATIENT)
Dept: PHYSICAL THERAPY | Age: 40
Setting detail: THERAPIES SERIES
Discharge: HOME | End: 2025-03-04
Payer: COMMERCIAL

## 2025-03-04 PROCEDURE — 97140 MANUAL THERAPY 1/> REGIONS: CPT

## 2025-03-04 PROCEDURE — 97110 THERAPEUTIC EXERCISES: CPT

## 2025-03-04 PROCEDURE — 97112 NEUROMUSCULAR REEDUCATION: CPT

## 2025-03-04 NOTE — FLOWSHEET NOTE
30-year-old female with a history of complicated external and internal hemorrhoids, serrated adenomas of the colon due surveillance (2024), presenting for evaluation of rectal pain/itching.   She was last seen 8/24/2022 with reports of several month history of postprandial upper abdominal pain, excess gas/bloating/reflux type symptoms, and atypical chest pain.  Considered possibly on the basis of nonulcer dyspepsia further exacerbated by suboptimally managed anxiety.  She reported her symptoms have been unchanged with pantoprazole famotidine.  EGD with pH testing was planned to evaluate for pathology reflux and consideration for initiation of nonnarcotic neuromodulators but would hold off for now given recent reinitiation of Zoloft.  It appears that she canceled the EGD with [facility].    Today she presents with complaints of this happened after pain/itching of rectum.  This was suspected to be hemorrhoidal in origin.  She reports that she has dealt with this for many years.  She was trialing over-the-counter progression H.  A friend gave her Anusol suppositories which worked for her an episode of constipation.  She reports that she recently started semaglutide and that has seemed to worsen it. She states she will not be on the GLP-1 much longer.  She does use MiraLAX as needed that has helped with her constipation. Her weight has been stable. She denies reflux, regurgitation, nausea, vomiting, or hematemesis. She denies bloating. There is no jaundice or icterus.
[email or web address]  URL: [email or web address]  Date: 02/18/2025  Prepared by: Hanh Guerin    Exercises  - Supine Scapular Retraction  - 1 x daily - 7 x weekly - 3 sets - 10 reps  - Supine Chin Tuck  - 1 x daily - 7 x weekly - 1 sets - 10 reps - 5\" hold  - Seated Scapular Retraction  - 1 x daily - 7 x weekly - 2 sets - 10 reps  - Seated Shoulder Shrug Circles AROM Backward  - 1 x daily - 7 x weekly - 2 sets - 10 reps  - Seated Cervical Rotation AROM  - 1 x daily - 7 x weekly - 1 sets - 10 reps  - Seated Cervical Flexion AROM  - 1 x daily - 7 x weekly - 1 sets - 10 reps  - Seated Cervical Extension AROM  - 1 x daily - 7 x weekly - 1 sets - 10 reps  - Seated Neck Sidebending Stretch  - 1 x daily - 7 x weekly - 1 sets - 3 reps - 30\" hold  - Gentle Levator Scapulae Stretch  - 1 x daily - 7 x weekly - 1 sets - 3 reps - 30\" hold    ASSESSMENT     Today's Assessment: Patient had fair  tolerance to today's session, reporting improved ROM, muscular fatigue, increased soreness, and challenge with program completed. Able to progress  exercise difficulty on Supine UE AROM. Continues to display deficits in stiffness and weakness which required ongoing skilled physical therapy and decision making.     Medical Necessity Documentation:  I certify that this patient meets the below criteria necessary for medical necessity for care and/or justification of therapy services:  The patient has a musculoskeletal condition(s) with a corresponding ICD-10 code that is of complexity and severity that require skilled therapeutic intervention. This has a direct and significant impact on the need for therapy and significantly impacts the rate of recovery.     Return to Play: NA    Prognosis for POC: [x] Good [] Fair  [] Poor    Patient requires continued skilled intervention: [x] Yes  [] No      CHARGE CAPTURE     PT CHARGE GRID   CPT Code (TIMED) minutes # CPT Code (UNTIMED) #     Therex (06227)  25' 2  EVAL:LOW (26853 - Typically 20 minutes

## 2025-03-06 ENCOUNTER — HOSPITAL ENCOUNTER (OUTPATIENT)
Dept: PHYSICAL THERAPY | Age: 40
Setting detail: THERAPIES SERIES
Discharge: HOME | End: 2025-03-06
Payer: COMMERCIAL

## 2025-03-06 PROCEDURE — 97140 MANUAL THERAPY 1/> REGIONS: CPT

## 2025-03-06 PROCEDURE — 97112 NEUROMUSCULAR REEDUCATION: CPT

## 2025-03-06 PROCEDURE — 97110 THERAPEUTIC EXERCISES: CPT

## 2025-03-06 NOTE — FLOWSHEET NOTE
[employer] access code: Access Access Code: [email or web address]  Access Code: [email or web address]  URL: [email or web address]  Date: 02/18/2025  Prepared by: Hanh Guerin    Exercises  - Supine Scapular Retraction  - 1 x daily - 7 x weekly - 3 sets - 10 reps  - Supine Chin Tuck  - 1 x daily - 7 x weekly - 1 sets - 10 reps - 5\" hold  - Seated Scapular Retraction  - 1 x daily - 7 x weekly - 2 sets - 10 reps  - Seated Shoulder Shrug Circles AROM Backward  - 1 x daily - 7 x weekly - 2 sets - 10 reps  - Seated Cervical Rotation AROM  - 1 x daily - 7 x weekly - 1 sets - 10 reps  - Seated Cervical Flexion AROM  - 1 x daily - 7 x weekly - 1 sets - 10 reps  - Seated Cervical Extension AROM  - 1 x daily - 7 x weekly - 1 sets - 10 reps  - Seated Neck Sidebending Stretch  - 1 x daily - 7 x weekly - 1 sets - 3 reps - 30\" hold  - Gentle Levator Scapulae Stretch  - 1 x daily - 7 x weekly - 1 sets - 3 reps - 30\" hold    ASSESSMENT     Today's Assessment: Patient had fair  tolerance to today's session, reporting improved ROM, muscular fatigue, increased soreness, and challenge with program completed. Able to progress  exercise difficulty on Supine UE AROM. Continues to display deficits in stiffness and weakness which required ongoing skilled physical therapy and decision making.     Medical Necessity Documentation:  I certify that this patient meets the below criteria necessary for medical necessity for care and/or justification of therapy services:  The patient has a musculoskeletal condition(s) with a corresponding ICD-10 code that is of complexity and severity that require skilled therapeutic intervention. This has a direct and significant impact on the need for therapy and significantly impacts the rate of recovery.     Return to Play: NA    Prognosis for POC: [x] Good [] Fair  [] Poor    Patient requires continued skilled intervention: [x] Yes  [] No      CHARGE CAPTURE     PT CHARGE GRID   CPT Code (TIMED) minutes # CPT Code (UNTIMED) #

## 2025-03-11 ENCOUNTER — HOSPITAL ENCOUNTER (OUTPATIENT)
Dept: PHYSICAL THERAPY | Age: 40
Setting detail: THERAPIES SERIES
Discharge: HOME | End: 2025-03-11
Payer: COMMERCIAL

## 2025-03-11 PROCEDURE — 97110 THERAPEUTIC EXERCISES: CPT

## 2025-03-11 PROCEDURE — 97140 MANUAL THERAPY 1/> REGIONS: CPT

## 2025-03-11 PROCEDURE — 97112 NEUROMUSCULAR REEDUCATION: CPT

## 2025-03-11 NOTE — FLOWSHEET NOTE
[facility] - Outpatient Rehabilitation and Therapy: [address], [place] [address] office: [phone] fax: [phone]      Physical Therapy: TREATMENT/PROGRESS NOTE   Patient: Luc Gar (39 y.o. male)   Examination Date: 2025   :  1985 MRN: [id number]   Visit #: 6   Insurance Allowable Auth Needed   Med Nec []Yes    [x]No    [place] / Product Type: *No Product type* /   Insurance ID: [id number] - ([place] BCBS)  Secondary Insurance (if applicable): [employer] O*   Treatment Diagnosis:     ICD-10-CM    1. Stiffness of cervical spine  M43.6       2. Neck pain  M54.2          Medical Diagnosis: Cervical DDD s/p Anterior Cervical Discectomy with  Mobi-C Arthroplasty of C5-C6; C6-C7 24      Referring Physician: Lalo Sanabria MD  PCP: Елена Long APRN - CNP     Plan of care signed (Y/N):     Date of Patient follow up with Physician:      Plan of Care Report: NO  POC update due: (10 visits /OR AUTH LIMITS, whichever is less)  3/20/2025                                             Medical History:  Comorbidities:  Other: see below  Relevant Medical History:   Past Medical History:   Diagnosis Date    Anxiety     GERD (gastroesophageal reflux disease)     Hx of blood clots     post left knee surgery                                              Precautions/ Contra-indications:           Latex allergy:  NO  Pacemaker:    NO  Contraindications for Manipulation: recent surgical history (relative)  Date of Surgery: 2024  Other:    Red Flags:  None    Suicide Screening:   The patient did not verbalize a primary behavioral concern, suicidal ideation, suicidal intent, or demonstrate suicidal behaviors.    Preferred Language for Healthcare:   [x] English       [] other:    SUBJECTIVE EXAMINATION     Patient stated complaint: Pt states he talked to the surgeon this week and they recommend he wean off his gabapentin and

## 2025-03-13 ENCOUNTER — HOSPITAL ENCOUNTER (OUTPATIENT)
Dept: PHYSICAL THERAPY | Age: 40
Setting detail: THERAPIES SERIES
Discharge: HOME | End: 2025-03-13
Payer: COMMERCIAL

## 2025-03-13 PROCEDURE — 97140 MANUAL THERAPY 1/> REGIONS: CPT

## 2025-03-13 PROCEDURE — 97112 NEUROMUSCULAR REEDUCATION: CPT

## 2025-03-13 PROCEDURE — 97110 THERAPEUTIC EXERCISES: CPT

## 2025-03-13 NOTE — FLOWSHEET NOTE
[facility] - Outpatient Rehabilitation and Therapy: [address], [place] [address] office: [phone] fax: [phone]      Physical Therapy: TREATMENT/PROGRESS NOTE   Patient: Luc Gar (39 y.o. male)   Examination Date: 2025   :  1985 MRN: [id number]   Visit #: 7   Insurance Allowable Auth Needed   Med Nec []Yes    [x]No    [place] / Product Type: *No Product type* /   Insurance ID: [id number] - ([place] BCBS)  Secondary Insurance (if applicable): [employer] O*   Treatment Diagnosis:     ICD-10-CM    1. Stiffness of cervical spine  M43.6       2. Neck pain  M54.2          Medical Diagnosis: Cervical DDD s/p Anterior Cervical Discectomy with  Mobi-C Arthroplasty of C5-C6; C6-C7 24      Referring Physician: Lalo Sanabria MD  PCP: Елена Long APRN - CNP     Plan of care signed (Y/N):     Date of Patient follow up with Physician:      Plan of Care Report: NO  POC update due: (10 visits /OR AUTH LIMITS, whichever is less)  3/20/2025                                             Medical History:  Comorbidities:  Other: see below  Relevant Medical History:   Past Medical History:   Diagnosis Date    Anxiety     GERD (gastroesophageal reflux disease)     Hx of blood clots     post left knee surgery                                              Precautions/ Contra-indications:           Latex allergy:  NO  Pacemaker:    NO  Contraindications for Manipulation: recent surgical history (relative)  Date of Surgery: 2024  Other:    Red Flags:  None    Suicide Screening:   The patient did not verbalize a primary behavioral concern, suicidal ideation, suicidal intent, or demonstrate suicidal behaviors.    Preferred Language for Healthcare:   [x] English       [] other:    SUBJECTIVE EXAMINATION     Patient stated complaint:  Pt states he has been pretty sore since helping out at his son's baseball practice/tried to

## 2025-03-18 ENCOUNTER — HOSPITAL ENCOUNTER (OUTPATIENT)
Dept: PHYSICAL THERAPY | Age: 40
Setting detail: THERAPIES SERIES
Discharge: HOME | End: 2025-03-18
Payer: COMMERCIAL

## 2025-03-18 PROCEDURE — 97112 NEUROMUSCULAR REEDUCATION: CPT

## 2025-03-18 PROCEDURE — 97140 MANUAL THERAPY 1/> REGIONS: CPT

## 2025-03-18 PROCEDURE — 97110 THERAPEUTIC EXERCISES: CPT

## 2025-03-20 ENCOUNTER — HOSPITAL ENCOUNTER (OUTPATIENT)
Dept: PHYSICAL THERAPY | Age: 40
Setting detail: THERAPIES SERIES
Discharge: HOME | End: 2025-03-20
Payer: COMMERCIAL

## 2025-03-20 PROCEDURE — 97110 THERAPEUTIC EXERCISES: CPT

## 2025-03-20 PROCEDURE — 97112 NEUROMUSCULAR REEDUCATION: CPT

## 2025-03-20 PROCEDURE — 97140 MANUAL THERAPY 1/> REGIONS: CPT

## 2025-03-20 NOTE — PLAN OF CARE
PLAN     Frequency/Duration: 1-2x/week for  12  weeks for the following treatment interventions:    Interventions:  Therapeutic Exercise (78153) including: strength training, ROM, and functional mobility  Therapeutic Activities (19542) including: functional mobility training and education.  Neuromuscular Re-education (24552) activation and proprioception, including postural re-education.    Gait Training (29888) for normalization of ambulation patterns and AD training.   Manual Therapy (27456) as indicated to include: Passive Range of Motion, Gr I-IV mobilizations, Soft Tissue Mobilization, Trigger Point Release, and Myofascial Release  Modalities as needed that may include: Cryotherapy, Electrical Stimulation, Thermal Agents, and Vasoneumatic Compression  Patient education on joint protection, postural re-education, activity modification, and progression of HEP    Plan: Cont POC- Continue emphasis/focus on exercise progression, improving proper muscle recruitment and activation/motor control patterns, modulating pain, increasing ROM, improving soft tissue extensibility, and allowing for proper ROM. Next visit plan to progress reps, add new exercises, and continue current phase     Electronically Signed by LENY GRANGER, PTA  Date: 03/20/2025     Note: Portions of this note have been templated and/or copied from initial evaluation, reassessments and prior notes for documentation efficiency.    Note: If patient does not return for scheduled/recommended follow up visits, this note will serve as a discharge from care along with the most recent update on progress.    Ortho Evaluation

## 2025-03-25 ENCOUNTER — HOSPITAL ENCOUNTER (OUTPATIENT)
Dept: PHYSICAL THERAPY | Age: 40
Setting detail: THERAPIES SERIES
Discharge: HOME | End: 2025-03-25
Payer: COMMERCIAL

## 2025-03-25 PROCEDURE — 97112 NEUROMUSCULAR REEDUCATION: CPT

## 2025-03-25 PROCEDURE — 97140 MANUAL THERAPY 1/> REGIONS: CPT

## 2025-03-25 PROCEDURE — 97110 THERAPEUTIC EXERCISES: CPT

## 2025-03-27 ENCOUNTER — HOSPITAL ENCOUNTER (OUTPATIENT)
Dept: PHYSICAL THERAPY | Age: 40
Setting detail: THERAPIES SERIES
Discharge: HOME | End: 2025-03-27
Payer: COMMERCIAL

## 2025-03-27 PROCEDURE — 97140 MANUAL THERAPY 1/> REGIONS: CPT

## 2025-03-27 PROCEDURE — 97112 NEUROMUSCULAR REEDUCATION: CPT

## 2025-03-27 PROCEDURE — 97110 THERAPEUTIC EXERCISES: CPT

## 2025-03-27 NOTE — FLOWSHEET NOTE
[facility] - Outpatient Rehabilitation and Therapy: [address], [place] [address] office: [phone] fax: [phone]    Physical Therapy: TREATMENT/PROGRESS NOTE   Patient: Luc Gar (39 y.o. male)   Examination Date: 2025   :  1985 MRN: [id number]   Visit #: 11   Insurance Allowable Auth Needed   Med Nec []Yes    [x]No    [place] / Product Type: *No Product type* /   Insurance ID: [id number] - ([place] BCBS)  Secondary Insurance (if applicable): [employer] O*   Treatment Diagnosis:     ICD-10-CM    1. Stiffness of cervical spine  M43.6       2. Neck pain  M54.2          Medical Diagnosis: Cervical DDD s/p Anterior Cervical Discectomy with  Mobi-C Arthroplasty of C5-C6; C6-C7 24      Referring Physician: Lalo Sanabria MD  PCP: Елена Long APRN - CNP     Plan of care signed (Y/N):     Date of Patient follow up with Physician:      Plan of Care Report: NO  POC update due: (10 visits /OR AUTH LIMITS, whichever is less)  25                                             Medical History:  Comorbidities:  Other: see below  Relevant Medical History:   Past Medical History:   Diagnosis Date    Anxiety     GERD (gastroesophageal reflux disease)     Hx of blood clots     post left knee surgery                                              Precautions/ Contra-indications:           Latex allergy:  NO  Pacemaker:    NO  Contraindications for Manipulation: recent surgical history (relative)  Date of Surgery: 2024  Other:    Red Flags:  None    Suicide Screening:   The patient did not verbalize a primary behavioral concern, suicidal ideation, suicidal intent, or demonstrate suicidal behaviors.    Preferred Language for Healthcare:   [x] English       [] other:    SUBJECTIVE EXAMINATION     Patient stated complaint:  pt doing better overall, he has his sleep cycle messed up right now and has been sleeping most of

## 2025-04-01 ENCOUNTER — HOSPITAL ENCOUNTER (OUTPATIENT)
Dept: PHYSICAL THERAPY | Age: 40
Setting detail: THERAPIES SERIES
Discharge: HOME | End: 2025-04-01
Payer: COMMERCIAL

## 2025-04-01 PROCEDURE — 97140 MANUAL THERAPY 1/> REGIONS: CPT

## 2025-04-01 PROCEDURE — 97112 NEUROMUSCULAR REEDUCATION: CPT

## 2025-04-01 PROCEDURE — 97110 THERAPEUTIC EXERCISES: CPT

## 2025-04-01 NOTE — FLOWSHEET NOTE
[facility] - Outpatient Rehabilitation and Therapy: [address], [place] [address] office: [phone] fax: [phone]    Physical Therapy: TREATMENT/PROGRESS NOTE   Patient: Luc Gar (39 y.o. male)   Examination Date: 2025   :  1985 MRN: [id number]   Visit #: 12   Insurance Allowable Auth Needed   Med Nec []Yes    [x]No    [place] / Product Type: *No Product type* /   Insurance ID: [id number] - ([place] BCBS)  Secondary Insurance (if applicable): [employer] O*   Treatment Diagnosis:     ICD-10-CM    1. Stiffness of cervical spine  M43.6       2. Neck pain  M54.2          Medical Diagnosis: Cervical DDD s/p Anterior Cervical Discectomy with  Mobi-C Arthroplasty of C5-C6; C6-C7 24      Referring Physician: Lalo Sanabria MD  PCP: Елена Long APRN - CNP     Plan of care signed (Y/N):     Date of Patient follow up with Physician:      Plan of Care Report: NO  POC update due: (10 visits /OR AUTH LIMITS, whichever is less)  25                                             Medical History:  Comorbidities:  Other: see below  Relevant Medical History:   Past Medical History:   Diagnosis Date    Anxiety     GERD (gastroesophageal reflux disease)     Hx of blood clots     post left knee surgery                                              Precautions/ Contra-indications:           Latex allergy:  NO  Pacemaker:    NO  Contraindications for Manipulation: recent surgical history (relative)  Date of Surgery: 2024  Other:    Red Flags:  None    Suicide Screening:   The patient did not verbalize a primary behavioral concern, suicidal ideation, suicidal intent, or demonstrate suicidal behaviors.    Preferred Language for Healthcare:   [x] English       [] other:    SUBJECTIVE EXAMINATION     Patient stated complaint: Pt reports being more sore today.      Test used Initial score  2025   Pain Summary

## 2025-04-03 ENCOUNTER — HOSPITAL ENCOUNTER (OUTPATIENT)
Dept: PHYSICAL THERAPY | Age: 40
Setting detail: THERAPIES SERIES
End: 2025-04-03
Payer: COMMERCIAL

## 2025-04-08 ENCOUNTER — HOSPITAL ENCOUNTER (OUTPATIENT)
Dept: PHYSICAL THERAPY | Age: 40
Setting detail: THERAPIES SERIES
Discharge: HOME | End: 2025-04-08
Payer: COMMERCIAL

## 2025-04-08 PROCEDURE — 97140 MANUAL THERAPY 1/> REGIONS: CPT

## 2025-04-08 PROCEDURE — 97110 THERAPEUTIC EXERCISES: CPT

## 2025-04-08 PROCEDURE — 97112 NEUROMUSCULAR REEDUCATION: CPT

## 2025-04-08 NOTE — FLOWSHEET NOTE
[facility] - Outpatient Rehabilitation and Therapy: [address], [place] [address] office: [phone] fax: [phone]    Physical Therapy: TREATMENT/PROGRESS NOTE   Patient: Luc Gar (39 y.o. male)   Examination Date: 2025   :  1985 MRN: [id number]   Visit #: 13   Insurance Allowable Auth Needed   Med Nec []Yes    [x]No    [place] / Product Type: *No Product type* /   Insurance ID: [id number] - ([place] BCBS)  Secondary Insurance (if applicable): [employer] O*   Treatment Diagnosis:     ICD-10-CM    1. Stiffness of cervical spine  M43.6       2. Neck pain  M54.2          Medical Diagnosis: Cervical DDD s/p Anterior Cervical Discectomy with  Mobi-C Arthroplasty of C5-C6; C6-C7 24      Referring Physician: Lalo Sanabria MD  PCP: Елена Long APRN - CNP     Plan of care signed (Y/N):     Date of Patient follow up with Physician:      Plan of Care Report: NO  POC update due: (10 visits /OR AUTH LIMITS, whichever is less)  25                                             Medical History:  Comorbidities:  Other: see below  Relevant Medical History:   Past Medical History:   Diagnosis Date    Anxiety     GERD (gastroesophageal reflux disease)     Hx of blood clots     post left knee surgery                                              Precautions/ Contra-indications:           Latex allergy:  NO  Pacemaker:    NO  Contraindications for Manipulation: recent surgical history (relative)  Date of Surgery: 2024  Other:    Red Flags:  None    Suicide Screening:   The patient did not verbalize a primary behavioral concern, suicidal ideation, suicidal intent, or demonstrate suicidal behaviors.    Preferred Language for Healthcare:   [x] English       [] other:    SUBJECTIVE EXAMINATION     Patient stated complaint: Pt states he is doing better with pain, just has stiffness and soreness when he wakes up.

## 2025-04-10 ENCOUNTER — HOSPITAL ENCOUNTER (OUTPATIENT)
Dept: PHYSICAL THERAPY | Age: 40
Setting detail: THERAPIES SERIES
Discharge: HOME | End: 2025-04-10
Payer: COMMERCIAL

## 2025-04-10 PROCEDURE — 97110 THERAPEUTIC EXERCISES: CPT

## 2025-04-10 PROCEDURE — 97112 NEUROMUSCULAR REEDUCATION: CPT

## 2025-04-10 PROCEDURE — 97140 MANUAL THERAPY 1/> REGIONS: CPT

## 2025-04-10 NOTE — FLOWSHEET NOTE
just implemented, too soon (<30days) to assess goals progression   [] Goals require adjustment due to lack of progress  [] Patient is not progressing as expected and requires additional follow up with physician  [] Other:     TREATMENT PLAN     Frequency/Duration: 1-2x/week for  12  weeks for the following treatment interventions:    Interventions:  Therapeutic Exercise (99027) including: strength training, ROM, and functional mobility  Therapeutic Activities (15037) including: functional mobility training and education.  Neuromuscular Re-education (60882) activation and proprioception, including postural re-education.    Gait Training (24798) for normalization of ambulation patterns and AD training.   Manual Therapy (44560) as indicated to include: Passive Range of Motion, Gr I-IV mobilizations, Soft Tissue Mobilization, Trigger Point Release, and Myofascial Release  Modalities as needed that may include: Cryotherapy, Electrical Stimulation, Thermal Agents, and Vasoneumatic Compression  Patient education on joint protection, postural re-education, activity modification, and progression of HEP    Plan: Cont POC- Continue emphasis/focus on exercise progression, improving proper muscle recruitment and activation/motor control patterns, modulating pain, increasing ROM, improving soft tissue extensibility, and allowing for proper ROM. Next visit plan to progress reps, add new exercises, and continue current phase     Electronically Signed by LENY GRANGER, PTA  Date: 04/10/2025     Note: Portions of this note have been templated and/or copied from initial evaluation, reassessments and prior notes for documentation efficiency.    Note: If patient does not return for scheduled/recommended follow up visits, this note will serve as a discharge from care along with the most recent update on progress.    Ortho Evaluation

## 2025-04-15 ENCOUNTER — HOSPITAL ENCOUNTER (OUTPATIENT)
Dept: PHYSICAL THERAPY | Age: 40
Setting detail: THERAPIES SERIES
Discharge: HOME | End: 2025-04-15
Payer: COMMERCIAL

## 2025-04-15 PROCEDURE — 97110 THERAPEUTIC EXERCISES: CPT

## 2025-04-15 NOTE — FLOWSHEET NOTE
waking up. Pt reports he did not eat prior to today's session.     Test used Initial score  2/18/25 04/15/2025   Pain Summary VAS 2/10 current with medication      5-6/10 at worst 0/10 at current    1-2/10 at worst   Functional questionnaire Neck Disability Index 26/50  52%    Other:                OBJECTIVE EXAMINATION     2/18/25  ROM/Strength: (Blank cells denote NT)      Mvmt (norm) ROM L ROM R Notes MMT L MMT R Notes     CERVICAL Flex (60) About 25% movement All AROM performed painfree      Ext (70) \"       SB(45) \" \"        Rotation (80) \" \"           SHOULDER Flexion (180) WFL WFL  4-/5 4-/5     Abduction (180) \" \"  \" \"     ER -0 \" \"  \" \"     ER -90 (90) \" \"  \" \"     IR -0 \" \"  \" \"     IR -90 (70) \" \"  \" \"      ELBOW Flex/biceps (140)          Ext/triceps (0)          Pronation (80)          Supination (80)             WRIST Flexion (60)          Extension (60)          RD (20)          UD (20)           N/a N/a              Exercises/Interventions     Therapeutic Ex (61506)   NMR re-education (38962) resistance Sets/time Reps Notes/Cues/Progressions          Wall Pushes with Chin Tuck  10'' 20 x     Cervical ball on wall ext  10\" 15 x           Seated Scap Squeeze  10'' 2 x 10    Seated Shrug with Scap Squeeze  10'' 2 x 10           Seated Cervical AROM Rotation   2 x 10    Not Completed d/t pts symptoms this date.   ''   Seated Chin Tucks  10'' 15 x  ''       ''   Gentle Upper Trap Stretch  30\" 3 x ''   Gentle Levator Scap Stretch  30\" 3 x ''                 Manual Intervention (93637)  TIME      ''    ''                               Therapeutic Activity (56756)  Sets/time                                          Modalities:    None performed on evaluation          Education/Home Exercise Program: Patient HEP program created electronically.  Refer to Zapproved access code: Access Access Code: [email or web address]  Access Code: [email or web address]  URL: [email or web address]  Date: 02/18/2025  Prepared by: Hanh

## 2025-04-17 ENCOUNTER — HOSPITAL ENCOUNTER (OUTPATIENT)
Dept: PHYSICAL THERAPY | Age: 40
Setting detail: THERAPIES SERIES
Discharge: HOME | End: 2025-04-17
Payer: COMMERCIAL

## 2025-04-17 PROCEDURE — 97112 NEUROMUSCULAR REEDUCATION: CPT

## 2025-04-17 PROCEDURE — 97110 THERAPEUTIC EXERCISES: CPT

## 2025-04-17 PROCEDURE — 97140 MANUAL THERAPY 1/> REGIONS: CPT

## 2025-04-17 NOTE — DISCHARGE SUMMARY
[facility] - Outpatient Rehabilitation and Therapy: [address]. [place] [address] office: [phone] fax: [phone]   Physical Therapy Discharge Summary    Dear Lalo Sanabria MD   ,    We had the pleasure of treating the following patient for physical therapy services at [facility].  A summary of our findings can be found in the discharge summary below.  If you have any questions or concerns regarding these findings, please do not hesitate to contact me at the office phone number checked above.  Thank you for the referral.         Total Visits: 16      Test used Initial score  2025   Pain Summary VAS 2/10 current with medication      5-6/10 at worst 0/10 at current    1-2/10 at worst   Functional questionnaire Neck Disability Index   52% 15/50  30%   Other:                Recommendation:   [] Hold PT, pending MD visit   [x] Discharge to [facility]. Follow up with PT or MD PRN.     Reason for Discharge:  Patient should continue to improve in reasonable time if they continue HEP    Physical Therapy: TREATMENT/PROGRESS NOTE   Patient: Luc Gar (39 y.o. male)   Examination Date: 2025   :  1985 MRN: [id number]   Visit #: 16   Insurance Allowable Auth Needed   Med Nec []Yes    [x]No    [place] / Product Type: *No Product type* /   Insurance ID: [id number] - ([facility])  Secondary Insurance (if applicable): [employer] O*   Treatment Diagnosis:     ICD-10-CM    1. Stiffness of cervical spine  M43.6       2. Neck pain  M54.2          Medical Diagnosis: Cervical DDD s/p Anterior Cervical Discectomy with  Mobi-C Arthroplasty of C5-C6; C6-C7 24      Referring Physician: Lalo Sanabria MD  PCP: Елена Long APRN - CNP     Plan of care signed (Y/N):     Date of Patient follow up with Physician:      Plan of Care Report: YES, Date Range for this report: 3/20/25 to

## (undated) DEVICE — 4.5 MM INCISOR PLUS STRAIGHT                                    BLADES, POWER/EP-1, VIOLET, PACKAGED                                    6 PER BOX, STERILE

## (undated) DEVICE — Z INACTIVE NO SUPPLIER SOLUTIONIRRIG 3000ML 0.9% SOD CHL FLX CONT [79720808] [HOSPIRA WORLDWIDE INC]

## (undated) DEVICE — PADDING CAST W6INXL4YD COT LO LINTING WYTEX

## (undated) DEVICE — TUBING, SUCTION, 3/16" X 10', STRAIGHT: Brand: MEDLINE

## (undated) DEVICE — GOWN,AURORA,NONREINF,RAGLAN,XXL,STERILE: Brand: MEDLINE

## (undated) DEVICE — Device

## (undated) DEVICE — GLOVE SURG SZ 85 L12IN FNGR THK94MIL STD WHT ISOLEX LTX

## (undated) DEVICE — GLOVE ORANGE PI 8 1/2   MSG9085

## (undated) DEVICE — TUBING PMP IRRIG GOFLO

## (undated) DEVICE — MANIFOLD SURG NEPTUNE WST MGMT

## (undated) DEVICE — WEREWOLF FLOW 90 COBLATION WAND: Brand: COBLATION

## (undated) DEVICE — TUBING PMP L8FT LNG W/ CONN FOR AR-6400 REDEUCE

## (undated) DEVICE — APPLICATOR PREP 26ML 0.7% IOD POVACRYLEX 74% ISO ALC ST

## (undated) DEVICE — Z INACTIVE USE 2660664 SOLUTION IRRIG 3000ML 0.9% SOD CHL USP UROMATIC PLAS CONT

## (undated) DEVICE — GOWN,SIRUS,NONRNF,3XL,18/CS: Brand: MEDLINE

## (undated) DEVICE — SUTURE ETHLN SZ 4-0 L18IN NONABSORBABLE BLK L19MM PS-2 3/8 1667H

## (undated) DEVICE — YANKAUER,BULB TIP,W/O VENT,RIGID,STERILE: Brand: MEDLINE

## (undated) DEVICE — TUBE IRRIG L8IN LNG PT W/ CONN FOR PMP SYS REDEUCE

## (undated) DEVICE — PACK PROCEDURE SURG SURGERYARTHROSCOPY KNEE

## (undated) DEVICE — 3M™ STERI-STRIP™ REINFORCED ADHESIVE SKIN CLOSURES, R1546, 1/4 IN X 4 IN (6 MM X 100 MM), 10 STRIPS/ENVELOPE: Brand: 3M™ STERI-STRIP™

## (undated) DEVICE — BLADE SHV L13CM DIA4MM EXCALIBUR AGG COOLCUT

## (undated) DEVICE — SUTURE NONABSORBABLE MONOFILAMENT 4-0 FS-2 18 IN ETHILON 662H